# Patient Record
Sex: FEMALE | Race: WHITE | NOT HISPANIC OR LATINO | Employment: OTHER | URBAN - METROPOLITAN AREA
[De-identification: names, ages, dates, MRNs, and addresses within clinical notes are randomized per-mention and may not be internally consistent; named-entity substitution may affect disease eponyms.]

---

## 2016-11-07 PROCEDURE — G0463 HOSPITAL OUTPT CLINIC VISIT: HCPCS | Performed by: RADIOLOGY

## 2017-01-05 ENCOUNTER — OFFICE VISIT (OUTPATIENT)
Dept: RADIATION ONCOLOGY | Facility: HOSPITAL | Age: 67
End: 2017-01-05

## 2017-01-05 ENCOUNTER — APPOINTMENT (OUTPATIENT)
Dept: RADIATION ONCOLOGY | Facility: HOSPITAL | Age: 67
End: 2017-01-05

## 2017-01-05 VITALS
SYSTOLIC BLOOD PRESSURE: 123 MMHG | DIASTOLIC BLOOD PRESSURE: 68 MMHG | OXYGEN SATURATION: 99 % | RESPIRATION RATE: 16 BRPM | HEART RATE: 72 BPM | HEIGHT: 65 IN | BODY MASS INDEX: 22.49 KG/M2 | WEIGHT: 135 LBS | TEMPERATURE: 98.2 F

## 2017-01-05 DIAGNOSIS — C50.212 MALIGNANT NEOPLASM OF UPPER-INNER QUADRANT OF LEFT FEMALE BREAST (HCC): Primary | ICD-10-CM

## 2017-01-05 PROCEDURE — 99214 OFFICE O/P EST MOD 30 MIN: CPT | Performed by: RADIOLOGY

## 2017-01-05 PROCEDURE — 77263 THER RADIOLOGY TX PLNG CPLX: CPT | Performed by: RADIOLOGY

## 2017-01-05 PROCEDURE — 77332 RADIATION TREATMENT AID(S): CPT | Performed by: RADIOLOGY

## 2017-01-05 PROCEDURE — 77290 THER RAD SIMULAJ FIELD CPLX: CPT | Performed by: RADIOLOGY

## 2017-01-05 NOTE — MR AVS SNAPSHOT
Yuli Huang   2017 2:45 PM   Office Visit    Dept Phone:  [phone]   Encounter #:  [id number]    Provider:  Erika Osborn MD   Department:  [facility] RADIATION ONCOLOGY                Your Full Care Plan              Your Updated Medication List          This list is accurate as of: 17  4:01 PM.  Always use your most recent med list.                atorvastatin 40 MG tablet   Commonly known as:  LIPITOR       CALTRATE 600 PO       famciclovir 500 MG tablet   Commonly known as:  FAMVIR       folic acid 400 MCG tablet   Commonly known as:  FOLVITE       hyoscyamine 0.125 MG tablet   Commonly known as:  ANASPAZ,LEVSIN       LOMOTIL 2.5-0.025 MG per tablet   Generic drug:  diphenoxylate-atropine       meclizine 25 MG tablet   Commonly known as:  ANTIVERT       metoprolol succinate XL 25 MG 24 hr tablet   Commonly known as:  TOPROL-XL       MULTIVITAL PO       saccharomyces boulardii 250 MG capsule   Commonly known as:  FLORASTOR               Instructions     None    Patient Instructions History      [employer] Signup     [facility] SellStage allows you to send messages to your doctor, view your test results, renew your prescriptions, schedule appointments, and more. To sign up, go to ticketstreet and click on the Sign Up Now link in the New User? box. Enter your SellStage Activation Code exactly as it appears below along with the last four digits of your Social Security Number and your Date of Birth () to complete the sign-up process. If you do not sign up before the expiration date, you must request a new code.    SellStage Activation Code: [email or web address]  Expires: 2017  4:01 PM    If you have questions, you can email [email or web address] or call [phone] to talk to our SellStage staff. Remember, SellStage is NOT to be used for urgent needs. For medical emergencies, dial 911.               Other Info from Your Visit            "  Your appointments     Date & Time Provider Appointment Department    Jan 06, 2017  1:10 PM EST LAB CHAIR 4 CBC KRESGE Lab [facility] KREE ONCOLOGY CBC LAB    Jan 06, 2017  1:45 PM EST CHAIR 20 CBC KRE - SM INFUSION [facility] INFUSION CBC    Jan 09, 2017  9:00 AM EST MATHEW PET ADMIN RM 1 NM mathew pet injection 15 [facility] PET    Jan 09, 2017 10:30 AM EST MATHEW PET 1 NM mathew pet skull to thigh [facility] PET    Jan 20, 2017  2:00 PM EST LAB CHAIR 2 CBC KRESGE Lab [facility] KRESGE ONCOLOGY CBC LAB    Jan 20, 2017  2:40 PM EST Jordon Bonilla MD FOLLOW UP [facility] CBC GROUP: CONSULTANTS IN BLOOD DISORDERS AND CANCER    Jan 27, 2017  1:00 PM EST CHAIR 16 CBC KRE - SM INFUSION [facility] INFUSION CBC    Feb 08, 2017 11:30 AM EST Janet Velasquez, PT BIOIMPEDANCE [facility] OP PT MEDICAL [facility]  [place]  [address]  [place] [id number] [phone] [facility] INFUSION CBC  [place]  [address]  [place] [address]  [phone] [facility] OP PT MEDICAL [facility]  [address] [facility] [address]  [phone]        [facility] PET  [place]  [address]  [place] [address] [phone] [facility] CBC GROUP: CONSULTANTS IN BLOOD DISORDERS AND CANCER  CBC [place]  [address]  [place] [address]  [phone]           Vital Signs     Blood Pressure Pulse Temperature Respirations Height Weight    123/68 72 98.2 °F (36.8 °C) (Oral) 16 65.35\" (166 cm) 135 lb (61.2 kg)    Last Menstrual Period Oxygen Saturation Body Mass Index Smoking Status          (LMP Unknown) 99% 22.23 kg/m2 Former Smoker        Problems and Diagnoses Noted     Breast cancer        "

## 2017-01-05 NOTE — PROGRESS NOTES
Subjective     Ref: Denis Cortes     Diagnosis Plan   1. Malignant neoplasm of upper-inner quadrant of left female breast       Chief Complaint   Patient presents with   • Breast Cancer     re eval- left breast                                   DX: [id number] left breast cancer [id number]          Dear Dr. Cortes:  Yuli Huang returns today for re-evaluation and treatment planning.  The patient is a 66 y.o. year old female with a remote history of stage I right breast cancer tx with lumpectomy and radiation over 3 years ago now with more recently diagnosed [id number] left breast cancer status post neoadjuvant chemotherapy with [id number] disease at lumpectomy. She has since undergone a left modified radical mastectomy on 11/16/16 with pathology revealing a residual focus of high grade DCIS meauring 16 MM with DCIS less than 1mm from the deep margin. Tumor was also present within the lymphatic spaces. Ten axillary lymph nodes were negative for metastatic involvement. She now has a total of 1 of 12 lymph nodes positive. She went into the ER Saturday December 10 and had fluid aspirated from the axilla and she saw you on Monday and has another 120cc removed she tells me. She states the fluid has resolved and she is ready to get started with the radiation.  She is on antibiotics and denies any fever or chills. She had a PET scan in March 2016 which showed discernable activity in an internal mammary node suspicious for involvement and she is scheduled for a repeat PET scan on Monday, January 9.               Review of Systems   Constitutional: Negative.    HENT: Negative.    Respiratory: Negative.    Musculoskeletal: Positive for back pain.         Past Medical History   Diagnosis Date   • Arthritis    • Breast cancer      Invasive mammary cancer right breast associated with DCIS   • Depression    • History of cardiac cath 07/2014     10-20% LI    • History of migraine    • History of postmenopausal HRT      Post menopause  hormones for 4 years   • Hx of radiation therapy    • Hyperlipidemia    • NSVT (nonsustained ventricular tachycardia)      RVOT tachycardia   • PONV (postoperative nausea and vomiting)    • Post-menopause    • Scarlet fever    • Urinary frequency    • VPC (ventricular premature complex)          Past Surgical History   Procedure Laterality Date   • Back surgery       Discectomy   •  section     • Eye surgery     • Lumbar fusion     • Breast lumpectomy with sentinel node biopsy and axillary node dissection Left 10/5/2016     Procedure: LT BREAST LUMPECTOMY WITH MAMMO NEEDLE LOC W/ SENTINEL NODE BIOPSY AND POSS AXILLARY NODE DISSECTION;  Surgeon: Denis Cortes MD;  Location: [facility];  Service:    • Breast biopsy Left    • Breast biopsy       Stereotactic biopsey of right breast   • Breast surgery  2013     Lumpectomy   • Breast lumpectomy     • Breast lumpectomy Right    • Mastectomy Left 2016     Procedure: LEFT BREAST MASTECTOMY WITH LEFT AXILLARY NODE DISSECTION;  Surgeon: Denis Cortes MD;  Location: [facility];  Service:          Social History     Social History   • Marital status:      Spouse name: N/A   • Number of children: 1   • Years of education: 13     Occupational History   • retired  [employer]     Social History Main Topics   • Smoking status: Former Smoker     Packs/day: 2.00     Start date:      Quit date:    • Smokeless tobacco: Never Used      Comment: Quit for 10 years.  2 packs / day for 30 years (60 pack years).   • Alcohol use No      Comment: caffeine use   • Drug use: No   • Sexual activity: Defer     Other Topics Concern   • None     Social History Narrative         Family History   Problem Relation Age of Onset   • Heart disease Father    • Heart disease Other    • Diabetes Mother    • Breast cancer Mother 81   • Breast cancer Sister           Objective    Physical Exam   Constitutional: She appears well-developed and  "well-nourished.   HENT:   Head: Atraumatic.   Eyes: EOM are normal.   Neck: Neck supple.   Pulmonary/Chest:       Lymphadenopathy:     She has no cervical adenopathy.         Current Outpatient Prescriptions on File Prior to Visit   Medication Sig Dispense Refill   • atorvastatin (LIPITOR) 40 MG tablet Take 40 mg by mouth Every Evening.     • Calcium Carbonate (CALTRATE 600 PO) Take 1 tablet by mouth Every Morning.     • diphenoxylate-atropine (LOMOTIL) 2.5-0.025 MG per tablet Take 2 tablets by mouth 4 (Four) Times a Day As Needed for diarrhea.     • famciclovir (FAMVIR) 500 MG tablet Take 500 mg by mouth 3 (Three) Times a Day As Needed (cold sore).     • folic acid (FOLVITE) 400 MCG tablet Take 400 mcg by mouth Every Morning.     • hyoscyamine (ANASPAZ,LEVSIN) 0.125 MG tablet Take 0.125 mg by mouth Every 4 (Four) Hours As Needed (overactive bladder).     • meclizine (ANTIVERT) 25 MG tablet Take 25 mg by mouth 3 (Three) Times a Day As Needed.     • metoprolol succinate XL (TOPROL-XL) 25 MG 24 hr tablet Take 25 mg by mouth Every Evening. 1/2 tab each dose     • Multiple Vitamins-Minerals (MULTIVITAL PO) Take 1 tablet/day by mouth Every Morning.     • saccharomyces boulardii (FLORASTOR) 250 MG capsule Take 250 mg by mouth 2 (Two) Times a Day.       No current facility-administered medications on file prior to visit.        ALLERGIES:    Allergies   Allergen Reactions   • Nitrofurantoin Anaphylaxis   • Amoxicillin Diarrhea and Rash       Visit Vitals   • /68   • Pulse 72   • Temp 98.2 °F (36.8 °C) (Oral)   • Resp 16   • Ht 65.35\" (166 cm)   • Wt 135 lb (61.2 kg)   • LMP  (LMP Unknown)   • SpO2 99%   • BMI 22.23 kg/m2        Current Status 12/12/2016   ECOG score 1         Assessment/Plan     66 year old female with stage I right breast cancer and recently diagnosed stage III left breast cancer, now 7 weeks out from mastectomy. She had [id number] at time of lumpectomy and still had residual high grade DCIS in " mastectomy specimen but 10 negative lymph nodes.  We will proceed with treatment planning today and she is scheduled for a pet on Monday, jan 9.      I discussed with her the risks, benefits and rationale of radiation therapy to include, but not limited to, the following:     Acute: skin erythema, breakdown, infection/cellulitis requiring antibiotics, fatigue, pneumonitis resulting in shortness of breath, cough or pain, infection, lymphedema of the left arm     Late: Permanent skin changes including hyperpigmentation, telangiectasias, fibrosis of the skin and surrounding tissue/muscle resulting in smaller size, poor cosmetic outcome, late edema or cellulitis, late rib fracture, late cardiac damage resulting in slight increased risk of heart attack, late pulmonary fibrosis, lymphedema, brachialplexopathy resulting in numbness, tingling, pain or paralysis of the arm and the remote risk of second malignancies.      She voiced understanding and was given an opportunity to ask questions which were answered to her satisfaction.  We will plan to begin her treatments Thursday January 12 or Monday January 16.       Thank you very much for allowing me to participate in the care of this very pleasant patient.    Sincerely,      Erika Osborn MD

## 2017-01-06 ENCOUNTER — LAB (OUTPATIENT)
Dept: LAB | Facility: HOSPITAL | Age: 67
End: 2017-01-06

## 2017-01-06 ENCOUNTER — INFUSION (OUTPATIENT)
Dept: ONCOLOGY | Facility: HOSPITAL | Age: 67
End: 2017-01-06

## 2017-01-06 VITALS
SYSTOLIC BLOOD PRESSURE: 136 MMHG | TEMPERATURE: 98.7 F | DIASTOLIC BLOOD PRESSURE: 65 MMHG | WEIGHT: 138.4 LBS | BODY MASS INDEX: 22.78 KG/M2 | HEART RATE: 88 BPM

## 2017-01-06 DIAGNOSIS — C50.212 MALIGNANT NEOPLASM OF UPPER-INNER QUADRANT OF LEFT FEMALE BREAST (HCC): Primary | ICD-10-CM

## 2017-01-06 DIAGNOSIS — C50.212 MALIGNANT NEOPLASM OF UPPER-INNER QUADRANT OF LEFT FEMALE BREAST (HCC): ICD-10-CM

## 2017-01-06 LAB
BASOPHILS # BLD AUTO: 0.04 10*3/MM3 (ref 0–0.1)
BASOPHILS NFR BLD AUTO: 0.6 % (ref 0–1.1)
DEPRECATED RDW RBC AUTO: 42.2 FL (ref 37–49)
EOSINOPHIL # BLD AUTO: 0.12 10*3/MM3 (ref 0–0.36)
EOSINOPHIL NFR BLD AUTO: 1.8 % (ref 1–5)
ERYTHROCYTE [DISTWIDTH] IN BLOOD BY AUTOMATED COUNT: 11.4 % (ref 11.7–14.5)
HCT VFR BLD AUTO: 38.3 % (ref 34–45)
HGB BLD-MCNC: 13.5 G/DL (ref 11.5–14.9)
IMM GRANULOCYTES # BLD: 0.02 10*3/MM3 (ref 0–0.03)
IMM GRANULOCYTES NFR BLD: 0.3 % (ref 0–0.5)
LYMPHOCYTES # BLD AUTO: 2.85 10*3/MM3 (ref 1–3.5)
LYMPHOCYTES NFR BLD AUTO: 42.8 % (ref 20–49)
MCH RBC QN AUTO: 35.4 PG (ref 27–33)
MCHC RBC AUTO-ENTMCNC: 35.2 G/DL (ref 32–35)
MCV RBC AUTO: 100.5 FL (ref 83–97)
MONOCYTES # BLD AUTO: 0.6 10*3/MM3 (ref 0.25–0.8)
MONOCYTES NFR BLD AUTO: 9 % (ref 4–12)
NEUTROPHILS # BLD AUTO: 3.03 10*3/MM3 (ref 1.5–7)
NEUTROPHILS NFR BLD AUTO: 45.5 % (ref 39–75)
NRBC BLD MANUAL-RTO: 0 /100 WBC (ref 0–0)
PLATELET # BLD AUTO: 215 10*3/MM3 (ref 150–375)
PMV BLD AUTO: 9.1 FL (ref 8.9–12.1)
RBC # BLD AUTO: 3.81 10*6/MM3 (ref 3.9–5)
WBC NRBC COR # BLD: 6.66 10*3/MM3 (ref 4–10)

## 2017-01-06 PROCEDURE — 36416 COLLJ CAPILLARY BLOOD SPEC: CPT | Performed by: INTERNAL MEDICINE

## 2017-01-06 PROCEDURE — 85025 COMPLETE CBC W/AUTO DIFF WBC: CPT | Performed by: INTERNAL MEDICINE

## 2017-01-06 PROCEDURE — 25010000002 TRASTUZUMAB PER 10 MG: Performed by: INTERNAL MEDICINE

## 2017-01-06 PROCEDURE — 96413 CHEMO IV INFUSION 1 HR: CPT | Performed by: INTERNAL MEDICINE

## 2017-01-06 RX ORDER — SODIUM CHLORIDE 9 MG/ML
250 INJECTION, SOLUTION INTRAVENOUS ONCE
Status: COMPLETED | OUTPATIENT
Start: 2017-01-06 | End: 2017-01-06

## 2017-01-06 RX ADMIN — Medication 360 MG: at 13:52

## 2017-01-06 RX ADMIN — SODIUM CHLORIDE 250 ML: 900 INJECTION, SOLUTION INTRAVENOUS at 14:00

## 2017-01-09 ENCOUNTER — HOSPITAL ENCOUNTER (OUTPATIENT)
Dept: PET IMAGING | Facility: HOSPITAL | Age: 67
Discharge: HOME | End: 2017-01-09
Attending: RADIOLOGY

## 2017-01-09 ENCOUNTER — HOSPITAL ENCOUNTER (OUTPATIENT)
Dept: PET IMAGING | Facility: HOSPITAL | Age: 67
Discharge: HOME | End: 2017-01-09
Attending: RADIOLOGY | Admitting: RADIOLOGY

## 2017-01-09 DIAGNOSIS — C50.212 MALIGNANT NEOPLASM OF UPPER-INNER QUADRANT OF LEFT FEMALE BREAST (HCC): ICD-10-CM

## 2017-01-09 PROCEDURE — A9552 F18 FDG: HCPCS | Performed by: RADIOLOGY

## 2017-01-09 PROCEDURE — 78815 PET IMAGE W/CT SKULL-THIGH: CPT

## 2017-01-09 PROCEDURE — 0 FLUDEOXYGLUCOSE F18 SOLUTION: Performed by: RADIOLOGY

## 2017-01-09 PROCEDURE — 77370 RADIATION PHYSICS CONSULT: CPT | Performed by: RADIOLOGY

## 2017-01-09 RX ADMIN — FLUDEOXYGLUCOSE F18 1 DOSE: 300 INJECTION INTRAVENOUS at 09:00

## 2017-01-17 ENCOUNTER — DOCUMENTATION (OUTPATIENT)
Dept: PSYCHIATRY | Facility: HOSPITAL | Age: 67
End: 2017-01-17

## 2017-01-17 PROCEDURE — 77295 3-D RADIOTHERAPY PLAN: CPT | Performed by: RADIOLOGY

## 2017-01-17 PROCEDURE — 77331 SPECIAL RADIATION DOSIMETRY: CPT | Performed by: RADIOLOGY

## 2017-01-17 NOTE — PROGRESS NOTES
Referral rec'd from Raquel Lizama nurse navigator.  Chart reviewed.  Patient dx with stage III breast cancer in April of 2016, underwent mastectomy, chemotherapy and about to undergo XRT treatment.    Psychosocial assessment completed.  Patient is 66 yr old white female, who has been  twice, but still has a working relationship with her 2nd ex .  Patient has 1 biological son and 3 step children.   Patient is retired from [employer] , has insurance and is not in any financial distress at this time.  Patient reports that she has a large support system of friends and family.  She states that her ex  and his new wife, Mey have been extremely helpful and a great sense of support during the last 10 years.    Patient has hx of depression and was [facility] when she was a teenager... She states that after counseling and medication her symptoms were well managed and controlled.  Patient reports that she hasn't had a major depressive episode until just this last November, 2016.  Patient disclosed a long story about her son, his current girlfriend and the traumatic events surrounding their relationship.  Patient states that she is seeking help on how to have a relationship with her son, when she has no desire to ever see his current girlfriend.  She has not seen or talked with her son since Thanksgiving ( they have texted twice).      Discussed current mood and anxieties.  Patient states that she is not anxious, but very sad about the possible loss of relationship with her son.  Patient reports uncontrollably crying, inability to function and loss of interest in anything about 3 months ago when the last episode occurred.  She states that she is doing much better now and is coping.  Discussed medication assistance and patient declined. OSW recommended Family Therapy with patient and son ( if he is receptive) ... Explained that patients girlfriend will more than likely need to be  included for future sessions if she is going to remain in her sons life.  OSW referred patient to Dr. Kaushal Du, Marriage and Family Therapist and to [facility] for OP Family Therapy.  [facility] accepts her insurance.  Dr. Du does not - she is private pay.    OSW explained her role as an oncology social worker and encouraged patient to follow up with me for supportive counseling.   Thank you,  Janet Booth, KARENW, CSW, OSW-C

## 2017-01-18 ENCOUNTER — RADIATION ONCOLOGY WEEKLY ASSESSMENT (OUTPATIENT)
Dept: RADIATION ONCOLOGY | Facility: HOSPITAL | Age: 67
End: 2017-01-18

## 2017-01-18 DIAGNOSIS — C50.212 MALIGNANT NEOPLASM OF UPPER-INNER QUADRANT OF LEFT FEMALE BREAST (HCC): Primary | ICD-10-CM

## 2017-01-18 PROCEDURE — 77300 RADIATION THERAPY DOSE PLAN: CPT | Performed by: RADIOLOGY

## 2017-01-18 PROCEDURE — 77417 THER RADIOLOGY PORT IMAGE(S): CPT | Performed by: RADIOLOGY

## 2017-01-18 PROCEDURE — 77280 THER RAD SIMULAJ FIELD SMPL: CPT | Performed by: RADIOLOGY

## 2017-01-18 PROCEDURE — 77334 RADIATION TREATMENT AID(S): CPT | Performed by: RADIOLOGY

## 2017-01-18 PROCEDURE — 77412 RADIATION TX DELIVERY LVL 3: CPT | Performed by: RADIOLOGY

## 2017-01-18 PROCEDURE — 77332 RADIATION TREATMENT AID(S): CPT | Performed by: RADIOLOGY

## 2017-01-18 PROCEDURE — 77427 RADIATION TX MANAGEMENT X5: CPT | Performed by: RADIOLOGY

## 2017-01-18 NOTE — PROGRESS NOTES
Physician Weekly Management Note    Diagnosis:     Diagnosis Plan   1. Malignant neoplasm of upper-inner quadrant of left female breast         RT Details: fx 1/28 left chest wall and regional nodes  Notes on Treatment course, Films, Medical progress:  Doing fine, I was present for entire clinical setup  On machine, she had no questiosn    Weekly Management:  Medication reviewed?   Yes  New medications given?   No  Problemlist reviewed?   Yes  Problem added?   No  Issues raised requiring referral to support services - task assigned to:  na    Technical aspects reviewed:  Weekly OBI approved?   Yes  Weekly port films approved?   Yes  Change requests noted on port film?   No  Patient setup and plan reviewed?   Yes    Chart Reviewed:  Continue current treatment plan?   Yes  Treatment plan change requested?   No  CBC reviewed?   Yes  Concurrent Chemo?   No    Objective     Toxicities:   none     Review of Systems   Constitutional: Negative.    HENT: Negative.    Respiratory: Negative.    Skin: Negative.           There were no vitals filed for this visit.    Current Status 12/12/2016   ECOG score 1       Physical Exam   Constitutional: She appears well-developed and well-nourished.           Problem Summary List    Diagnosis:     Diagnosis Plan   1. Malignant neoplasm of upper-inner quadrant of left female breast       Pathology:   Ductal adenoc    Past Medical History   Diagnosis Date   • Arthritis    • Breast cancer      Invasive mammary cancer right breast associated with DCIS   • Depression    • History of cardiac cath 07/2014     10-20% LI    • History of migraine    • History of postmenopausal HRT      Post menopause hormones for 4 years   • Hx of radiation therapy    • Hyperlipidemia    • NSVT (nonsustained ventricular tachycardia)      RVOT tachycardia   • PONV (postoperative nausea and vomiting)    • Post-menopause    • Scarlet fever    • Urinary frequency    • VPC (ventricular premature complex)          Past  Surgical History   Procedure Laterality Date   • Back surgery       Discectomy   •  section     • Eye surgery     • Lumbar fusion     • Breast lumpectomy with sentinel node biopsy and axillary node dissection Left 10/5/2016     Procedure: LT BREAST LUMPECTOMY WITH MAMMO NEEDLE LOC W/ SENTINEL NODE BIOPSY AND POSS AXILLARY NODE DISSECTION;  Surgeon: Denis Cortes MD;  Location: [facility];  Service:    • Breast biopsy Left    • Breast biopsy       Stereotactic biopsey of right breast   • Breast surgery  2013     Lumpectomy   • Breast lumpectomy     • Breast lumpectomy Right    • Mastectomy Left 2016     Procedure: LEFT BREAST MASTECTOMY WITH LEFT AXILLARY NODE DISSECTION;  Surgeon: Denis Cortes MD;  Location: [facility];  Service:          Current Outpatient Prescriptions on File Prior to Visit   Medication Sig Dispense Refill   • atorvastatin (LIPITOR) 40 MG tablet Take 40 mg by mouth Every Evening.     • Calcium Carbonate (CALTRATE 600 PO) Take 1 tablet by mouth Every Morning.     • diphenoxylate-atropine (LOMOTIL) 2.5-0.025 MG per tablet Take 2 tablets by mouth 4 (Four) Times a Day As Needed for diarrhea.     • famciclovir (FAMVIR) 500 MG tablet Take 500 mg by mouth 3 (Three) Times a Day As Needed (cold sore).     • folic acid (FOLVITE) 400 MCG tablet Take 400 mcg by mouth Every Morning.     • hyoscyamine (ANASPAZ,LEVSIN) 0.125 MG tablet Take 0.125 mg by mouth Every 4 (Four) Hours As Needed (overactive bladder).     • meclizine (ANTIVERT) 25 MG tablet Take 25 mg by mouth 3 (Three) Times a Day As Needed.     • metoprolol succinate XL (TOPROL-XL) 25 MG 24 hr tablet Take 25 mg by mouth Every Evening. 1/2 tab each dose     • Multiple Vitamins-Minerals (MULTIVITAL PO) Take 1 tablet/day by mouth Every Morning.     • saccharomyces boulardii (FLORASTOR) 250 MG capsule Take 250 mg by mouth 2 (Two) Times a Day.       No current facility-administered medications on file prior to visit.         Allergies   Allergen Reactions   • Nitrofurantoin Anaphylaxis   • Amoxicillin Diarrhea and Rash         Referring Provider:    No referring provider defined for this encounter.    Oncologist:  No primary care provider on file.      Seen and approved by:  Erika Osborn MD  01/18/2017

## 2017-01-19 PROCEDURE — 77412 RADIATION TX DELIVERY LVL 3: CPT | Performed by: RADIOLOGY

## 2017-01-19 PROCEDURE — 77336 RADIATION PHYSICS CONSULT: CPT | Performed by: RADIOLOGY

## 2017-01-20 ENCOUNTER — OFFICE VISIT (OUTPATIENT)
Dept: ONCOLOGY | Facility: CLINIC | Age: 67
End: 2017-01-20

## 2017-01-20 ENCOUNTER — APPOINTMENT (OUTPATIENT)
Dept: LAB | Facility: HOSPITAL | Age: 67
End: 2017-01-20

## 2017-01-20 VITALS
HEART RATE: 88 BPM | WEIGHT: 140.2 LBS | TEMPERATURE: 98.5 F | SYSTOLIC BLOOD PRESSURE: 110 MMHG | OXYGEN SATURATION: 97 % | DIASTOLIC BLOOD PRESSURE: 62 MMHG | HEIGHT: 65 IN | RESPIRATION RATE: 16 BRPM | BODY MASS INDEX: 23.36 KG/M2

## 2017-01-20 DIAGNOSIS — C50.212 MALIGNANT NEOPLASM OF UPPER-INNER QUADRANT OF LEFT FEMALE BREAST (HCC): Primary | ICD-10-CM

## 2017-01-20 PROCEDURE — 77412 RADIATION TX DELIVERY LVL 3: CPT | Performed by: RADIOLOGY

## 2017-01-20 PROCEDURE — G0463 HOSPITAL OUTPT CLINIC VISIT: HCPCS | Performed by: INTERNAL MEDICINE

## 2017-01-20 PROCEDURE — 99213 OFFICE O/P EST LOW 20 MIN: CPT | Performed by: INTERNAL MEDICINE

## 2017-01-20 RX ORDER — SODIUM CHLORIDE 9 MG/ML
250 INJECTION, SOLUTION INTRAVENOUS ONCE
Status: CANCELLED | OUTPATIENT
Start: 2017-01-27

## 2017-01-20 RX ORDER — SODIUM CHLORIDE 9 MG/ML
250 INJECTION, SOLUTION INTRAVENOUS ONCE
Status: CANCELLED | OUTPATIENT
Start: 2017-02-17

## 2017-01-20 NOTE — LETTER
January 20, 2017     Pardeep Stewart MD  [address]  [facility] [address]    Patient: Yuli Huang   YOB: 1950   Date of Visit: 1/20/2017       Dear Dr. Terry MD:    Yuli Huang was in my office today. Below is a copy of my note.    If you have questions, please do not hesitate to call me. I look forward to following Yuli along with you.         Sincerely,        Jordon Bonilla MD        CC: MD Avni Bradley MD Jamie D Kemp, MD Crystal H. McMahan, MD    [facility] OUTPATIENT CLINIC FOLLOW UP VISIT    REASON FOR FOLLOW-UP:      Malignant neoplasm of upper-inner quadrant of left female breast    Staging form: Breast, AJCC V7      Clinical stage from 4/14/2016: Stage IIIC (T3, N3b, M0) - Signed by Jordon Bonilla MD on 4/28/2016    Malignant neoplasm of upper-outer quadrant of right female breast    Staging form: Breast, AJCC V7      Clinical stage from 3/25/2016: Stage IA (rT1a, N0, M0) - Signed by Erika Osborn MD on 3/25/2016    HISTORY OF PRESENT ILLNESS:  Yuli Huang is a 66 y.o. female who returns today for follow up of the above issue.      She has recovered from her mastectomy.  She started radiation on Wednesday.  This is going well so far.  Her peripheral neuropathy improved but has now plateaued and she continues to have some numbness in her fingertips and on the bottom of her feet.  It is not really impairing function at this point but it is annoying.  I advised her to take the vitamin B complex vitamin.  Food is starting to taste more normal.  In addition, she states that her hair has not started to grow in as much as she would like at this point.  Also, she has had some emotional issues involving her son.  She is going to seek out some counseling for this.    ONCOLOGIC HISTORY:     Malignant neoplasm of upper-outer quadrant of right female breast    5/13/2013 Initial Diagnosis        5/13/2013 Biopsy    Biopsy:   Ductal carcinoma in situ.  ER 20%, LA 1-4%.  Her2 not done.      6/4/2013 Surgery    Lumpectomy by Dr. Denis Cortes.  2mm invasive cancer associated with DCIS.  ER/LA negative on the DCIS; unable to be performed on the invasive component.        6/18/2013 Surgery    [place] lymph node biopsy:  3 nodes negative.      7/10/2013 - 8/23/2013 Radiation    Radiation therapy to the right breast.        Malignant neoplasm of upper-inner quadrant of left female breast    3/31/2016 Imaging    Ultrasound left breast:  10 o'clock position, 3x2cm mass with a 2cm axillary lymph node.      4/7/2016 Biopsy    Ultrasound-guided biopsy of the left breast and axillary lymph node.  ER/LA negative, HER-2 overexpressed.  Grade 2.        4/19/2016 Imaging    PET scan:  Left breast mass demonstrate significant metabolic activity.  There is a single 1.7 cm lymph node at the left axilla which  demonstrates significant metabolic activity for its size. There is also  a subcentimeter node in the left internal mammary chain which  nonetheless demonstrates discernible activity. I suspect an early erasto  metastasis. No further evidence for metastatic disease is present.      4/21/2016 Imaging    Breast MRI:  1. Biopsy-proven malignancy in the left breast extending from the 8:30  o'clock through 1 o'clock positions and measuring up to 7.6 cm in  greatest dimension. Adjacent satellite clumped foci of enhancement are  also noted in the upper outer and lower outer quadrants. Left axillary  adenopathy is also appreciated.  2. There are no findings suspicious for malignancy in the right breast.  Postbreast conservation therapy changes of the right breast are noted.      4/29/2016 - 8/15/2016 Chemotherapy    Neoadjuvant TCHP      9/8/2016 Imaging    MRI breasts:  1. Findings consistent with significant interval response to neoadjuvant  chemotherapy in the left breast. However, there remains some scattered  stippled foci of enhancement that are  asymmetric in the left breast  compared to the right breast and they are from the 8 o'clock through 12  o'clock positions in distribution. This corresponds to a region that was  previously occupied by considerable neoplastic disease/malignancy, thus  microscopic multifocal disease is suspected. There has been resolution  of left axillary adenopathy.  2. There are no findings suspicious for malignancy in the right breast.      10/5/2016 Surgery    Lumpectomy with sentinel lymph node biopsy:  Breast:  DCIS spanning 3.3cm.  ER/DC negative.  Multifocal.  No invasive carcinoma  1 of 2 sentinel nodes positive for carcinoma measuring 1.1mm without extracapsular extension.        11/16/2016 Surgery    Left modified radical mastectomy with axillary lymph node sampling by Dr. Cortes.  High grade DCIS with margins <1mm.  10 benign axillary lymph nodes.            PAST MEDICAL, SURGICAL, FAMILY, AND SOCIAL HISTORIES WERE REVIEWED WITH THE PATIENT AND IN THE ELECTRONIC MEDICAL RECORD, AND WERE UPDATED IF INDICATED.    ALLERGIES:  Allergies   Allergen Reactions   • Nitrofurantoin Anaphylaxis   • Amoxicillin Diarrhea and Rash       MEDICATIONS:  The medication list has been reviewed with the patient by the medical assistant, and the list has been updated in the electronic medical record, which I reviewed.  Medication dosages and frequencies were confirmed to be accurate.    REVIEW OF SYSTEMS:  PAIN:  See Vital Signs below.  GENERAL:  No fevers, chills, night sweats, or unintended weight loss.  SKIN:  No rashes or non-healing lesions.  Alopecia on the scalp persists.  HEME/LYMPH:  No abnormal bleeding.  No palpable lymphadenopathy.  EYES:  No vision changes or diplopia.  ENT:  No mucositis or ulcers  RESPIRATORY:  No cough, shortness of breath, hemoptysis, or wheezing.  CARDIOVASCULAR:  No chest pain, palpitations, orthopnea, or dyspnea on exertion.  GASTROINTESTINAL:  See history of present illness.  GENITOURINARY:  No dysuria or  "hematuria.  MUSCULOSKELETAL:  Improving range of motion of the left shoulder.    NEUROLOGIC:  Numbness in her fingertips and on the bottom of her feet  PSYCHIATRIC:  Some anxiety and tearfulness persists.    Vitals:    01/20/17 1428   BP: 110/62   Pulse: 88   Resp: 16   Temp: 98.5 °F (36.9 °C)   TempSrc: Oral   SpO2: 97%   Weight: 140 lb 3.2 oz (63.6 kg)   Height: 65.35\" (166 cm)   PainSc: 0-No pain       PHYSICAL EXAMINATION:  GENERAL:  Well-developed well-nourished female; awake, alert and oriented, in no acute distress.  SKIN: Marked for radiation  HEAD:  Normocephalic, atraumatic. Alopecia   EYES:  Pupils equal, round and reactive to light.  Extraocular movements intact.  Conjunctivae normal.  EARS:  Hearing intact.  NOSE:  Septum midline.  No excoriations or nasal discharge.  MOUTH:  No stomatitis or ulcers.  Lips are normal.  THROAT:  Oropharynx without lesions or exudates.  NECK:  Supple with good range of motion; no thyromegaly or masses; no JVD or bruits.  LYMPHATICS:  No cervical, supraclavicular, axillary, or inguinal lymphadenopathy.  CHEST:  Lungs are clear to auscultation bilaterally.  No wheezes, rales, or rhonchi.  A Mediport is present as benign in appearance in the right subclavian area.  HEART:  Regular rate; normal rhythm.  No murmurs, gallops or rubs.  ABDOMEN:  Soft, non-tender, non-distended.  Normal active bowel sounds.  No organomegaly  EXTREMITIES:  No clubbing, cyanosis, or edema.  NEUROLOGICAL:  No focal neurologic deficits.  BREASTS:  Not examined today.    DIAGNOSTIC DATA:  Lab Results   Component Value Date    WBC 6.66 01/06/2017    HGB 13.5 01/06/2017    HCT 38.3 01/06/2017    .5 (H) 01/06/2017     01/06/2017       Lab Results   Component Value Date    GLUCOSE 137 (H) 11/09/2016    BUN 17 11/09/2016    CREATININE 0.69 11/09/2016    EGFRIFNONA 86 11/09/2016    EGFRIFAFRI  09/16/2016      Comment:      <15 Indicative of kidney failure.    BCR 24.6 11/09/2016    CO2 26.9 " 11/09/2016    CALCIUM 8.8 11/09/2016    ALBUMIN 3.80 11/09/2016    LABIL2 1.7 11/09/2016    AST 22 11/09/2016    ALT 22 11/09/2016     Lab Results   Component Value Date    GLUCOSE 137 (H) 11/09/2016    CALCIUM 8.8 11/09/2016     11/09/2016    K 3.7 11/09/2016    CO2 26.9 11/09/2016     11/09/2016    BUN 17 11/09/2016    CREATININE 0.69 11/09/2016    EGFRIFAFRI  09/16/2016      Comment:      <15 Indicative of kidney failure.    EGFRIFNONA 86 11/09/2016    BCR 24.6 11/09/2016    ANIONGAP 13.1 11/09/2016     Imaging: None reviewed    ASSESSMENT:  This is a 66 y.o. female with:  1.  History of stage I carcinoma of the right breast.  The biopsy initially showed DCIS.  There was a tiny invasive component on the surgical specimen.  This was minimally hormone receptor positive and HER-2 was not able to be tested.  She had radiation following her surgery but no medical therapy.  2.  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  She completed 6 cycles of neoadjuvant chemotherapy with TCH P followed by a lumpectomy on 10/5/2016.  No residual invasive carcinoma in the breast but there was extensive DCIS with very close margins.  One lymph node was positive for metastatic carcinoma.  We discussed her case at the multidisciplinary breast conference.  She had a mammogram that showed some persistent suspicious calcifications and therefore on 11/16/2016 had a left modified radical mastectomy with axillary lymph node sampling.  High-grade DCIS was present but no invasive carcinoma and no lymph nodes were positive.  Margins were very close.  We proceed with Herceptin today and every 3 weeks through April.  She had a PET scan that is negative.  She proceeds with radiation therapy.  3.  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      PLAN:   1.  Herceptin next week and every 3 weeks through  April.  2.  Echocardiograms every 3 months.  Her next will be due in early February.  This was ordered today.  3.  She'll continue radiation under the direction of Dr. Osborn.  4.  She will seek out some counseling  5.  Start a vitamin B complex vitamin  6.  She will return next week for Herceptin and then every 3 weeks thereafter.  I will see her back in 7 weeks.

## 2017-01-20 NOTE — PROGRESS NOTES
[facility] OUTPATIENT CLINIC FOLLOW UP VISIT    REASON FOR FOLLOW-UP:      Malignant neoplasm of upper-inner quadrant of left female breast    Staging form: Breast, AJCC V7      Clinical stage from 4/14/2016: Stage IIIC (T3, N3b, M0) - Signed by Jordon Bonilla MD on 4/28/2016    Malignant neoplasm of upper-outer quadrant of right female breast    Staging form: Breast, AJCC V7      Clinical stage from 3/25/2016: Stage IA (rT1a, N0, M0) - Signed by Erika Osborn MD on 3/25/2016    HISTORY OF PRESENT ILLNESS:  Yuli Huang is a 66 y.o. female who returns today for follow up of the above issue.      She has recovered from her mastectomy.  She started radiation on Wednesday.  This is going well so far.  Her peripheral neuropathy improved but has now plateaued and she continues to have some numbness in her fingertips and on the bottom of her feet.  It is not really impairing function at this point but it is annoying.  I advised her to take the vitamin B complex vitamin.  Food is starting to taste more normal.  In addition, she states that her hair has not started to grow in as much as she would like at this point.  Also, she has had some emotional issues involving her son.  She is going to seek out some counseling for this.    ONCOLOGIC HISTORY:     Malignant neoplasm of upper-outer quadrant of right female breast    5/13/2013 Initial Diagnosis        5/13/2013 Biopsy    Biopsy:  Ductal carcinoma in situ.  ER 20%, MS 1-4%.  Her2 not done.      6/4/2013 Surgery    Lumpectomy by Dr. Denis Cortes.  2mm invasive cancer associated with DCIS.  ER/MS negative on the DCIS; unable to be performed on the invasive component.        6/18/2013 Surgery    [place] lymph node biopsy:  3 nodes negative.      7/10/2013 - 8/23/2013 Radiation    Radiation therapy to the right breast.        Malignant neoplasm of upper-inner quadrant of left female breast    3/31/2016 Imaging    Ultrasound left breast:  10 o'clock  position, 3x2cm mass with a 2cm axillary lymph node.      4/7/2016 Biopsy    Ultrasound-guided biopsy of the left breast and axillary lymph node.  ER/NE negative, HER-2 overexpressed.  Grade 2.        4/19/2016 Imaging    PET scan:  Left breast mass demonstrate significant metabolic activity.  There is a single 1.7 cm lymph node at the left axilla which  demonstrates significant metabolic activity for its size. There is also  a subcentimeter node in the left internal mammary chain which  nonetheless demonstrates discernible activity. I suspect an early erasto  metastasis. No further evidence for metastatic disease is present.      4/21/2016 Imaging    Breast MRI:  1. Biopsy-proven malignancy in the left breast extending from the 8:30  o'clock through 1 o'clock positions and measuring up to 7.6 cm in  greatest dimension. Adjacent satellite clumped foci of enhancement are  also noted in the upper outer and lower outer quadrants. Left axillary  adenopathy is also appreciated.  2. There are no findings suspicious for malignancy in the right breast.  Postbreast conservation therapy changes of the right breast are noted.      4/29/2016 - 8/15/2016 Chemotherapy    Neoadjuvant TCHP      9/8/2016 Imaging    MRI breasts:  1. Findings consistent with significant interval response to neoadjuvant  chemotherapy in the left breast. However, there remains some scattered  stippled foci of enhancement that are asymmetric in the left breast  compared to the right breast and they are from the 8 o'clock through 12  o'clock positions in distribution. This corresponds to a region that was  previously occupied by considerable neoplastic disease/malignancy, thus  microscopic multifocal disease is suspected. There has been resolution  of left axillary adenopathy.  2. There are no findings suspicious for malignancy in the right breast.      10/5/2016 Surgery    Lumpectomy with sentinel lymph node biopsy:  Breast:  DCIS spanning 3.3cm.  ER/NE  "negative.  Multifocal.  No invasive carcinoma  1 of 2 sentinel nodes positive for carcinoma measuring 1.1mm without extracapsular extension.        11/16/2016 Surgery    Left modified radical mastectomy with axillary lymph node sampling by Dr. Cortes.  High grade DCIS with margins <1mm.  10 benign axillary lymph nodes.            PAST MEDICAL, SURGICAL, FAMILY, AND SOCIAL HISTORIES WERE REVIEWED WITH THE PATIENT AND IN THE ELECTRONIC MEDICAL RECORD, AND WERE UPDATED IF INDICATED.    ALLERGIES:  Allergies   Allergen Reactions   • Nitrofurantoin Anaphylaxis   • Amoxicillin Diarrhea and Rash       MEDICATIONS:  The medication list has been reviewed with the patient by the medical assistant, and the list has been updated in the electronic medical record, which I reviewed.  Medication dosages and frequencies were confirmed to be accurate.    REVIEW OF SYSTEMS:  PAIN:  See Vital Signs below.  GENERAL:  No fevers, chills, night sweats, or unintended weight loss.  SKIN:  No rashes or non-healing lesions.  Alopecia on the scalp persists.  HEME/LYMPH:  No abnormal bleeding.  No palpable lymphadenopathy.  EYES:  No vision changes or diplopia.  ENT:  No mucositis or ulcers  RESPIRATORY:  No cough, shortness of breath, hemoptysis, or wheezing.  CARDIOVASCULAR:  No chest pain, palpitations, orthopnea, or dyspnea on exertion.  GASTROINTESTINAL:  See history of present illness.  GENITOURINARY:  No dysuria or hematuria.  MUSCULOSKELETAL:  Improving range of motion of the left shoulder.    NEUROLOGIC:  Numbness in her fingertips and on the bottom of her feet  PSYCHIATRIC:  Some anxiety and tearfulness persists.    Vitals:    01/20/17 1428   BP: 110/62   Pulse: 88   Resp: 16   Temp: 98.5 °F (36.9 °C)   TempSrc: Oral   SpO2: 97%   Weight: 140 lb 3.2 oz (63.6 kg)   Height: 65.35\" (166 cm)   PainSc: 0-No pain       PHYSICAL EXAMINATION:  GENERAL:  Well-developed well-nourished female; awake, alert and oriented, in no acute " distress.  SKIN: Marked for radiation  HEAD:  Normocephalic, atraumatic. Alopecia   EYES:  Pupils equal, round and reactive to light.  Extraocular movements intact.  Conjunctivae normal.  EARS:  Hearing intact.  NOSE:  Septum midline.  No excoriations or nasal discharge.  MOUTH:  No stomatitis or ulcers.  Lips are normal.  THROAT:  Oropharynx without lesions or exudates.  NECK:  Supple with good range of motion; no thyromegaly or masses; no JVD or bruits.  LYMPHATICS:  No cervical, supraclavicular, axillary, or inguinal lymphadenopathy.  CHEST:  Lungs are clear to auscultation bilaterally.  No wheezes, rales, or rhonchi.  A Mediport is present as benign in appearance in the right subclavian area.  HEART:  Regular rate; normal rhythm.  No murmurs, gallops or rubs.  ABDOMEN:  Soft, non-tender, non-distended.  Normal active bowel sounds.  No organomegaly  EXTREMITIES:  No clubbing, cyanosis, or edema.  NEUROLOGICAL:  No focal neurologic deficits.  BREASTS:  Not examined today.    DIAGNOSTIC DATA:  Lab Results   Component Value Date    WBC 6.66 01/06/2017    HGB 13.5 01/06/2017    HCT 38.3 01/06/2017    .5 (H) 01/06/2017     01/06/2017       Lab Results   Component Value Date    GLUCOSE 137 (H) 11/09/2016    BUN 17 11/09/2016    CREATININE 0.69 11/09/2016    EGFRIFNONA 86 11/09/2016    EGFRIFAFRI  09/16/2016      Comment:      <15 Indicative of kidney failure.    BCR 24.6 11/09/2016    CO2 26.9 11/09/2016    CALCIUM 8.8 11/09/2016    ALBUMIN 3.80 11/09/2016    LABIL2 1.7 11/09/2016    AST 22 11/09/2016    ALT 22 11/09/2016     Lab Results   Component Value Date    GLUCOSE 137 (H) 11/09/2016    CALCIUM 8.8 11/09/2016     11/09/2016    K 3.7 11/09/2016    CO2 26.9 11/09/2016     11/09/2016    BUN 17 11/09/2016    CREATININE 0.69 11/09/2016    EGFRIFAFRI  09/16/2016      Comment:      <15 Indicative of kidney failure.    EGFRIFNONA 86 11/09/2016    BCR 24.6 11/09/2016    ANIONGAP 13.1 11/09/2016      Imaging: None reviewed    ASSESSMENT:  This is a 66 y.o. female with:  1.  History of stage I carcinoma of the right breast.  The biopsy initially showed DCIS.  There was a tiny invasive component on the surgical specimen.  This was minimally hormone receptor positive and HER-2 was not able to be tested.  She had radiation following her surgery but no medical therapy.  2.  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  She completed 6 cycles of neoadjuvant chemotherapy with TCH P followed by a lumpectomy on 10/5/2016.  No residual invasive carcinoma in the breast but there was extensive DCIS with very close margins.  One lymph node was positive for metastatic carcinoma.  We discussed her case at the multidisciplinary breast conference.  She had a mammogram that showed some persistent suspicious calcifications and therefore on 11/16/2016 had a left modified radical mastectomy with axillary lymph node sampling.  High-grade DCIS was present but no invasive carcinoma and no lymph nodes were positive.  Margins were very close.  We proceed with Herceptin today and every 3 weeks through April.  She had a PET scan that is negative.  She proceeds with radiation therapy.  3.  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      PLAN:   1.  Herceptin next week and every 3 weeks through April.  2.  Echocardiograms every 3 months.  Her next will be due in early February.  This was ordered today.  3.  She'll continue radiation under the direction of Dr. Osborn.  4.  She will seek out some counseling  5.  Start a vitamin B complex vitamin  6.  She will return next week for Herceptin and then every 3 weeks thereafter.  I will see her back in 7 weeks.

## 2017-01-23 PROCEDURE — 77412 RADIATION TX DELIVERY LVL 3: CPT | Performed by: RADIOLOGY

## 2017-01-24 PROCEDURE — 77417 THER RADIOLOGY PORT IMAGE(S): CPT | Performed by: RADIOLOGY

## 2017-01-24 PROCEDURE — 77412 RADIATION TX DELIVERY LVL 3: CPT | Performed by: RADIOLOGY

## 2017-01-25 PROCEDURE — 77412 RADIATION TX DELIVERY LVL 3: CPT | Performed by: RADIOLOGY

## 2017-01-25 PROCEDURE — 77427 RADIATION TX MANAGEMENT X5: CPT | Performed by: RADIOLOGY

## 2017-01-26 ENCOUNTER — RADIATION ONCOLOGY WEEKLY ASSESSMENT (OUTPATIENT)
Dept: RADIATION ONCOLOGY | Facility: HOSPITAL | Age: 67
End: 2017-01-26
Payer: COMMERCIAL

## 2017-01-26 VITALS
HEART RATE: 79 BPM | WEIGHT: 140 LBS | BODY MASS INDEX: 23.05 KG/M2 | SYSTOLIC BLOOD PRESSURE: 119 MMHG | OXYGEN SATURATION: 96 % | DIASTOLIC BLOOD PRESSURE: 62 MMHG | RESPIRATION RATE: 16 BRPM | TEMPERATURE: 97.6 F

## 2017-01-26 DIAGNOSIS — C50.212 MALIGNANT NEOPLASM OF UPPER-INNER QUADRANT OF LEFT FEMALE BREAST, UNSPECIFIED ESTROGEN RECEPTOR STATUS: Primary | ICD-10-CM

## 2017-01-26 PROCEDURE — 77412 RADIATION TX DELIVERY LVL 3: CPT | Performed by: RADIOLOGY

## 2017-01-26 PROCEDURE — 77336 RADIATION PHYSICS CONSULT: CPT | Performed by: RADIOLOGY

## 2017-01-27 ENCOUNTER — INFUSION (OUTPATIENT)
Dept: ONCOLOGY | Facility: HOSPITAL | Age: 67
End: 2017-01-27

## 2017-01-27 ENCOUNTER — LAB (OUTPATIENT)
Dept: LAB | Facility: HOSPITAL | Age: 67
End: 2017-01-27

## 2017-01-27 VITALS
TEMPERATURE: 98.3 F | WEIGHT: 143.4 LBS | SYSTOLIC BLOOD PRESSURE: 123 MMHG | BODY MASS INDEX: 23.61 KG/M2 | HEART RATE: 76 BPM | DIASTOLIC BLOOD PRESSURE: 65 MMHG

## 2017-01-27 DIAGNOSIS — C50.212 MALIGNANT NEOPLASM OF UPPER-INNER QUADRANT OF LEFT FEMALE BREAST (HCC): Primary | ICD-10-CM

## 2017-01-27 DIAGNOSIS — C50.212 MALIGNANT NEOPLASM OF UPPER-INNER QUADRANT OF LEFT FEMALE BREAST (HCC): ICD-10-CM

## 2017-01-27 LAB
BASOPHILS # BLD AUTO: 0.02 10*3/MM3 (ref 0–0.1)
BASOPHILS NFR BLD AUTO: 0.4 % (ref 0–1.1)
DEPRECATED RDW RBC AUTO: 43.3 FL (ref 37–49)
EOSINOPHIL # BLD AUTO: 0.11 10*3/MM3 (ref 0–0.36)
EOSINOPHIL NFR BLD AUTO: 2.4 % (ref 1–5)
ERYTHROCYTE [DISTWIDTH] IN BLOOD BY AUTOMATED COUNT: 11.5 % (ref 11.7–14.5)
HCT VFR BLD AUTO: 33.9 % (ref 34–45)
HGB BLD-MCNC: 11.8 G/DL (ref 11.5–14.9)
IMM GRANULOCYTES # BLD: 0.02 10*3/MM3 (ref 0–0.03)
IMM GRANULOCYTES NFR BLD: 0.4 % (ref 0–0.5)
LYMPHOCYTES # BLD AUTO: 1.43 10*3/MM3 (ref 1–3.5)
LYMPHOCYTES NFR BLD AUTO: 31.3 % (ref 20–49)
MCH RBC QN AUTO: 35.6 PG (ref 27–33)
MCHC RBC AUTO-ENTMCNC: 34.8 G/DL (ref 32–35)
MCV RBC AUTO: 102.4 FL (ref 83–97)
MONOCYTES # BLD AUTO: 0.42 10*3/MM3 (ref 0.25–0.8)
MONOCYTES NFR BLD AUTO: 9.2 % (ref 4–12)
NEUTROPHILS # BLD AUTO: 2.57 10*3/MM3 (ref 1.5–7)
NEUTROPHILS NFR BLD AUTO: 56.3 % (ref 39–75)
NRBC BLD MANUAL-RTO: 0 /100 WBC (ref 0–0)
PLATELET # BLD AUTO: 158 10*3/MM3 (ref 150–375)
PMV BLD AUTO: 9.6 FL (ref 8.9–12.1)
RBC # BLD AUTO: 3.31 10*6/MM3 (ref 3.9–5)
WBC NRBC COR # BLD: 4.57 10*3/MM3 (ref 4–10)

## 2017-01-27 PROCEDURE — 96413 CHEMO IV INFUSION 1 HR: CPT | Performed by: INTERNAL MEDICINE

## 2017-01-27 PROCEDURE — 36416 COLLJ CAPILLARY BLOOD SPEC: CPT | Performed by: INTERNAL MEDICINE

## 2017-01-27 PROCEDURE — 77412 RADIATION TX DELIVERY LVL 3: CPT | Performed by: RADIOLOGY

## 2017-01-27 PROCEDURE — 85025 COMPLETE CBC W/AUTO DIFF WBC: CPT | Performed by: INTERNAL MEDICINE

## 2017-01-27 PROCEDURE — 25010000002 TRASTUZUMAB PER 10 MG: Performed by: INTERNAL MEDICINE

## 2017-01-27 RX ORDER — SODIUM CHLORIDE 9 MG/ML
250 INJECTION, SOLUTION INTRAVENOUS ONCE
Status: COMPLETED | OUTPATIENT
Start: 2017-01-27 | End: 2017-01-27

## 2017-01-27 RX ADMIN — Medication 360 MG: at 13:20

## 2017-01-27 RX ADMIN — SODIUM CHLORIDE 250 ML: 900 INJECTION, SOLUTION INTRAVENOUS at 13:25

## 2017-01-30 ENCOUNTER — RADIATION ONCOLOGY WEEKLY ASSESSMENT (OUTPATIENT)
Dept: RADIATION ONCOLOGY | Facility: HOSPITAL | Age: 67
End: 2017-01-30

## 2017-01-30 VITALS
DIASTOLIC BLOOD PRESSURE: 73 MMHG | WEIGHT: 143 LBS | HEART RATE: 64 BPM | RESPIRATION RATE: 16 BRPM | SYSTOLIC BLOOD PRESSURE: 123 MMHG | OXYGEN SATURATION: 97 % | HEIGHT: 65 IN | BODY MASS INDEX: 23.82 KG/M2 | TEMPERATURE: 98.7 F

## 2017-01-30 DIAGNOSIS — C50.411 MALIGNANT NEOPLASM OF UPPER-OUTER QUADRANT OF RIGHT FEMALE BREAST (HCC): Primary | ICD-10-CM

## 2017-01-30 PROCEDURE — 77412 RADIATION TX DELIVERY LVL 3: CPT | Performed by: RADIOLOGY

## 2017-01-30 NOTE — PROGRESS NOTES
Physician Weekly Management Note    Diagnosis:     Diagnosis Plan   1. Malignant neoplasm of upper-outer quadrant of right female breast         RT Details:  fx 9 imc, supracalv chest wall    Notes on Treatment course, Films, Medical progress:  Doing well, moderate fatigue, no erythema    Weekly Management:  Medication reviewed?   Yes  New medications given?   No  Problemlist reviewed?   Yes  Problem added?   No  Issues raised requiring referral to support services - task assigned to:  na    Technical aspects reviewed:  Weekly OBI approved?   Yes  Weekly port films approved?   Yes  Change requests noted on port film?   No  Patient setup and plan reviewed?   Yes    Chart Reviewed:  Continue current treatment plan?   Yes  Treatment plan change requested?   No  CBC reviewed?   Yes  Concurrent Chemo?   No    Objective     Toxicities:   fatigue     Review of Systems   Constitutional: Positive for fatigue.          Vitals:    01/30/17 1109   BP: 123/73   Pulse: 64   Resp: 16   Temp: 98.7 °F (37.1 °C)   SpO2: 97%       Current Status 1/20/2017   ECOG score 0       Physical Exam   Constitutional: She appears well-developed and well-nourished.   HENT:   Head: Normocephalic and atraumatic.   Pulmonary/Chest:               Problem Summary List    Diagnosis:     Diagnosis Plan   1. Malignant neoplasm of upper-outer quadrant of right female breast       Pathology:   Ductal adeno    Past Medical History   Diagnosis Date   • Arthritis    • Breast cancer      Invasive mammary cancer right breast associated with DCIS   • Depression    • History of cardiac cath 07/2014     10-20% LI    • History of migraine    • History of postmenopausal HRT      Post menopause hormones for 4 years   • Hx of radiation therapy    • Hyperlipidemia    • NSVT (nonsustained ventricular tachycardia)      RVOT tachycardia   • PONV (postoperative nausea and vomiting)    • Post-menopause    • Scarlet fever    • Urinary frequency    • VPC (ventricular  premature complex)          Past Surgical History   Procedure Laterality Date   • Back surgery       Discectomy   •  section     • Eye surgery     • Lumbar fusion     • Breast lumpectomy with sentinel node biopsy and axillary node dissection Left 10/5/2016     Procedure: LT BREAST LUMPECTOMY WITH MAMMO NEEDLE LOC W/ SENTINEL NODE BIOPSY AND POSS AXILLARY NODE DISSECTION;  Surgeon: Denis Cortes MD;  Location: [facility];  Service:    • Breast biopsy Left    • Breast biopsy       Stereotactic biopsey of right breast   • Breast surgery  2013     Lumpectomy   • Breast lumpectomy     • Breast lumpectomy Right    • Mastectomy Left 2016     Procedure: LEFT BREAST MASTECTOMY WITH LEFT AXILLARY NODE DISSECTION;  Surgeon: Denis Cortes MD;  Location: [facility];  Service:          Current Outpatient Prescriptions on File Prior to Visit   Medication Sig Dispense Refill   • atorvastatin (LIPITOR) 40 MG tablet Take 40 mg by mouth Every Evening.     • Calcium Carbonate (CALTRATE 600 PO) Take 1 tablet by mouth Every Morning.     • diphenoxylate-atropine (LOMOTIL) 2.5-0.025 MG per tablet Take 2 tablets by mouth 4 (Four) Times a Day As Needed for diarrhea.     • famciclovir (FAMVIR) 500 MG tablet Take 500 mg by mouth 3 (Three) Times a Day As Needed (cold sore).     • folic acid (FOLVITE) 400 MCG tablet Take 400 mcg by mouth Every Morning.     • hyoscyamine (ANASPAZ,LEVSIN) 0.125 MG tablet Take 0.125 mg by mouth Every 4 (Four) Hours As Needed (overactive bladder).     • meclizine (ANTIVERT) 25 MG tablet Take 25 mg by mouth 3 (Three) Times a Day As Needed.     • metoprolol succinate XL (TOPROL-XL) 25 MG 24 hr tablet Take 25 mg by mouth Every Evening. 1/2 tab each dose     • Multiple Vitamins-Minerals (MULTIVITAL PO) Take 1 tablet/day by mouth Every Morning.     • saccharomyces boulardii (FLORASTOR) 250 MG capsule Take 250 mg by mouth 2 (Two) Times a Day.       No current facility-administered  medications on file prior to visit.        Allergies   Allergen Reactions   • Nitrofurantoin Anaphylaxis   • Amoxicillin Diarrhea and Rash         Referring Provider:    No referring provider defined for this encounter.    Oncologist:  No primary care provider on file.      Seen and approved by:  Erika Osborn MD  01/30/2017

## 2017-01-31 PROCEDURE — 77412 RADIATION TX DELIVERY LVL 3: CPT | Performed by: RADIOLOGY

## 2017-01-31 PROCEDURE — 77417 THER RADIOLOGY PORT IMAGE(S): CPT | Performed by: RADIOLOGY

## 2017-02-01 ENCOUNTER — APPOINTMENT (OUTPATIENT)
Dept: RADIATION ONCOLOGY | Facility: HOSPITAL | Age: 67
End: 2017-02-01

## 2017-02-01 PROCEDURE — 77412 RADIATION TX DELIVERY LVL 3: CPT | Performed by: RADIOLOGY

## 2017-02-01 PROCEDURE — 77427 RADIATION TX MANAGEMENT X5: CPT | Performed by: RADIOLOGY

## 2017-02-02 PROCEDURE — 77412 RADIATION TX DELIVERY LVL 3: CPT | Performed by: RADIOLOGY

## 2017-02-02 PROCEDURE — 77336 RADIATION PHYSICS CONSULT: CPT | Performed by: RADIOLOGY

## 2017-02-03 PROCEDURE — 77412 RADIATION TX DELIVERY LVL 3: CPT | Performed by: RADIOLOGY

## 2017-02-03 PROCEDURE — 77321 SPECIAL TELETX PORT PLAN: CPT | Performed by: RADIOLOGY

## 2017-02-06 PROCEDURE — 77412 RADIATION TX DELIVERY LVL 3: CPT | Performed by: RADIOLOGY

## 2017-02-07 PROCEDURE — 77280 THER RAD SIMULAJ FIELD SMPL: CPT | Performed by: RADIOLOGY

## 2017-02-07 PROCEDURE — 77334 RADIATION TREATMENT AID(S): CPT | Performed by: RADIOLOGY

## 2017-02-07 PROCEDURE — 77412 RADIATION TX DELIVERY LVL 3: CPT | Performed by: RADIOLOGY

## 2017-02-07 PROCEDURE — 77417 THER RADIOLOGY PORT IMAGE(S): CPT | Performed by: RADIOLOGY

## 2017-02-07 PROCEDURE — 77300 RADIATION THERAPY DOSE PLAN: CPT | Performed by: RADIOLOGY

## 2017-02-08 ENCOUNTER — HOSPITAL ENCOUNTER (OUTPATIENT)
Dept: CARDIOLOGY | Facility: HOSPITAL | Age: 67
Discharge: HOME | End: 2017-02-08
Attending: INTERNAL MEDICINE | Admitting: INTERNAL MEDICINE

## 2017-02-08 ENCOUNTER — RADIATION ONCOLOGY WEEKLY ASSESSMENT (OUTPATIENT)
Dept: RADIATION ONCOLOGY | Facility: HOSPITAL | Age: 67
End: 2017-02-08

## 2017-02-08 ENCOUNTER — APPOINTMENT (OUTPATIENT)
Dept: PHYSICAL THERAPY | Facility: HOSPITAL | Age: 67
End: 2017-02-08

## 2017-02-08 VITALS
WEIGHT: 143 LBS | DIASTOLIC BLOOD PRESSURE: 66 MMHG | OXYGEN SATURATION: 99 % | SYSTOLIC BLOOD PRESSURE: 136 MMHG | HEART RATE: 72 BPM | BODY MASS INDEX: 23.82 KG/M2 | HEIGHT: 65 IN

## 2017-02-08 VITALS
RESPIRATION RATE: 16 BRPM | BODY MASS INDEX: 23.54 KG/M2 | OXYGEN SATURATION: 99 % | WEIGHT: 143 LBS | TEMPERATURE: 96.8 F | HEART RATE: 71 BPM | DIASTOLIC BLOOD PRESSURE: 65 MMHG | SYSTOLIC BLOOD PRESSURE: 135 MMHG

## 2017-02-08 DIAGNOSIS — C50.212 MALIGNANT NEOPLASM OF UPPER-INNER QUADRANT OF LEFT FEMALE BREAST (HCC): Primary | ICD-10-CM

## 2017-02-08 DIAGNOSIS — C50.212 MALIGNANT NEOPLASM OF UPPER-INNER QUADRANT OF LEFT FEMALE BREAST (HCC): ICD-10-CM

## 2017-02-08 LAB
BH CV ECHO MEAS - ACS: 1.9 CM
BH CV ECHO MEAS - AO MAX PG (FULL): 2.8 MMHG
BH CV ECHO MEAS - AO MAX PG: 6.1 MMHG
BH CV ECHO MEAS - AO MEAN PG (FULL): 1 MMHG
BH CV ECHO MEAS - AO MEAN PG: 3 MMHG
BH CV ECHO MEAS - AO ROOT AREA (BSA CORRECTED): 1.7
BH CV ECHO MEAS - AO ROOT AREA: 6.6 CM^2
BH CV ECHO MEAS - AO ROOT DIAM: 2.9 CM
BH CV ECHO MEAS - AO V2 MAX: 123 CM/SEC
BH CV ECHO MEAS - AO V2 MEAN: 78 CM/SEC
BH CV ECHO MEAS - AO V2 VTI: 28.8 CM
BH CV ECHO MEAS - AVA(I,A): 1.6 CM^2
BH CV ECHO MEAS - AVA(I,D): 1.6 CM^2
BH CV ECHO MEAS - AVA(V,A): 1.7 CM^2
BH CV ECHO MEAS - AVA(V,D): 1.7 CM^2
BH CV ECHO MEAS - BSA(HAYCOCK): 1.7 M^2
BH CV ECHO MEAS - BSA: 1.7 M^2
BH CV ECHO MEAS - BZI_BMI: 23.8 KILOGRAMS/M^2
BH CV ECHO MEAS - BZI_METRIC_HEIGHT: 165.1 CM
BH CV ECHO MEAS - BZI_METRIC_WEIGHT: 64.9 KG
BH CV ECHO MEAS - CONTRAST EF (2CH): 58 ML/M^2
BH CV ECHO MEAS - CONTRAST EF 4CH: 65.9 ML/M^2
BH CV ECHO MEAS - EDV(MOD-SP2): 88 ML
BH CV ECHO MEAS - EDV(MOD-SP4): 85 ML
BH CV ECHO MEAS - EDV(TEICH): 107.5 ML
BH CV ECHO MEAS - EF(CUBED): 73.1 %
BH CV ECHO MEAS - EF(MOD-SP2): 58 %
BH CV ECHO MEAS - EF(TEICH): 64.7 %
BH CV ECHO MEAS - ESV(MOD-SP2): 37 ML
BH CV ECHO MEAS - ESV(MOD-SP4): 29 ML
BH CV ECHO MEAS - ESV(TEICH): 37.9 ML
BH CV ECHO MEAS - FS: 35.4 %
BH CV ECHO MEAS - IVS/LVPW: 1.1
BH CV ECHO MEAS - IVSD: 1.1 CM
BH CV ECHO MEAS - LAT PEAK E' VEL: 11 CM/SEC
BH CV ECHO MEAS - LV DIASTOLIC VOL/BSA (35-75): 49.6 ML/M^2
BH CV ECHO MEAS - LV MASS(C)D: 181.9 GRAMS
BH CV ECHO MEAS - LV MASS(C)DI: 106.1 GRAMS/M^2
BH CV ECHO MEAS - LV MAX PG: 3.3 MMHG
BH CV ECHO MEAS - LV MEAN PG: 2 MMHG
BH CV ECHO MEAS - LV SYSTOLIC VOL/BSA (12-30): 16.9 ML/M^2
BH CV ECHO MEAS - LV V1 MAX: 90.2 CM/SEC
BH CV ECHO MEAS - LV V1 MEAN: 61.5 CM/SEC
BH CV ECHO MEAS - LV V1 VTI: 20.1 CM
BH CV ECHO MEAS - LVIDD: 4.8 CM
BH CV ECHO MEAS - LVIDS: 3.1 CM
BH CV ECHO MEAS - LVLD AP2: 8.5 CM
BH CV ECHO MEAS - LVLD AP4: 8.6 CM
BH CV ECHO MEAS - LVLS AP2: 7.7 CM
BH CV ECHO MEAS - LVLS AP4: 7.3 CM
BH CV ECHO MEAS - LVOT AREA (M): 2.3 CM^2
BH CV ECHO MEAS - LVOT AREA: 2.3 CM^2
BH CV ECHO MEAS - LVOT DIAM: 1.7 CM
BH CV ECHO MEAS - LVPWD: 1 CM
BH CV ECHO MEAS - MED PEAK E' VEL: 10 CM/SEC
BH CV ECHO MEAS - MR MAX PG: 89.9 MMHG
BH CV ECHO MEAS - MR MAX VEL: 474 CM/SEC
BH CV ECHO MEAS - MV A DUR: 0.1 SEC
BH CV ECHO MEAS - MV A MAX VEL: 89.8 CM/SEC
BH CV ECHO MEAS - MV DEC SLOPE: 295 CM/SEC^2
BH CV ECHO MEAS - MV DEC TIME: 0.22 SEC
BH CV ECHO MEAS - MV E MAX VEL: 63.2 CM/SEC
BH CV ECHO MEAS - MV E/A: 0.7
BH CV ECHO MEAS - MV MAX PG: 4.5 MMHG
BH CV ECHO MEAS - MV MEAN PG: 2 MMHG
BH CV ECHO MEAS - MV P1/2T MAX VEL: 67.6 CM/SEC
BH CV ECHO MEAS - MV P1/2T: 67.1 MSEC
BH CV ECHO MEAS - MV V2 MAX: 106 CM/SEC
BH CV ECHO MEAS - MV V2 MEAN: 59.2 CM/SEC
BH CV ECHO MEAS - MV V2 VTI: 28.2 CM
BH CV ECHO MEAS - MVA P1/2T LCG: 3.3 CM^2
BH CV ECHO MEAS - MVA(P1/2T): 3.3 CM^2
BH CV ECHO MEAS - MVA(VTI): 1.6 CM^2
BH CV ECHO MEAS - PA MAX PG (FULL): 1.8 MMHG
BH CV ECHO MEAS - PA MAX PG: 3.2 MMHG
BH CV ECHO MEAS - PA V2 MAX: 89.6 CM/SEC
BH CV ECHO MEAS - PULM A REVS DUR: 113 SEC
BH CV ECHO MEAS - PULM A REVS VEL: 29.1 CM/SEC
BH CV ECHO MEAS - PULM DIAS VEL: 42 CM/SEC
BH CV ECHO MEAS - PULM S/D: 1.2
BH CV ECHO MEAS - PULM SYS VEL: 49.9 CM/SEC
BH CV ECHO MEAS - PVA(V,A): 1.5 CM^2
BH CV ECHO MEAS - PVA(V,D): 1.5 CM^2
BH CV ECHO MEAS - QP/QS: 0.82
BH CV ECHO MEAS - RAP SYSTOLE: 3 MMHG
BH CV ECHO MEAS - RV MAX PG: 1.4 MMHG
BH CV ECHO MEAS - RV MEAN PG: 1 MMHG
BH CV ECHO MEAS - RV V1 MAX: 60.2 CM/SEC
BH CV ECHO MEAS - RV V1 MEAN: 41.5 CM/SEC
BH CV ECHO MEAS - RV V1 VTI: 16.4 CM
BH CV ECHO MEAS - RVOT AREA: 2.3 CM^2
BH CV ECHO MEAS - RVOT DIAM: 1.7 CM
BH CV ECHO MEAS - RVSP: 26 MMHG
BH CV ECHO MEAS - SI(AO): 110.9 ML/M^2
BH CV ECHO MEAS - SI(CUBED): 47.1 ML/M^2
BH CV ECHO MEAS - SI(LVOT): 26.6 ML/M^2
BH CV ECHO MEAS - SI(MOD-SP2): 29.7 ML/M^2
BH CV ECHO MEAS - SI(MOD-SP4): 32.6 ML/M^2
BH CV ECHO MEAS - SI(TEICH): 40.6 ML/M^2
BH CV ECHO MEAS - SUP REN AO DIAM: 1.6 CM
BH CV ECHO MEAS - SV(AO): 190.2 ML
BH CV ECHO MEAS - SV(CUBED): 80.8 ML
BH CV ECHO MEAS - SV(LVOT): 45.6 ML
BH CV ECHO MEAS - SV(MOD-SP2): 51 ML
BH CV ECHO MEAS - SV(MOD-SP4): 56 ML
BH CV ECHO MEAS - SV(RVOT): 37.2 ML
BH CV ECHO MEAS - SV(TEICH): 69.6 ML
BH CV ECHO MEAS - TAPSE (>1.6): 2.7 CM2
BH CV ECHO MEAS - TR MAX VEL: 236 CM/SEC
BH CV XLRA - TDI S': 11 CM/SEC
E/E' RATIO: 7
LEFT ATRIUM VOLUME INDEX: 16 ML/M2
LV EF 2D ECHO EST: 62 %

## 2017-02-08 PROCEDURE — 77412 RADIATION TX DELIVERY LVL 3: CPT | Performed by: RADIOLOGY

## 2017-02-08 PROCEDURE — 25010000002 PERFLUTREN (DEFINITY) 8.476 MG IN SODIUM CHLORIDE 10 ML INJECTION: Performed by: INTERNAL MEDICINE

## 2017-02-08 PROCEDURE — 93306 TTE W/DOPPLER COMPLETE: CPT | Performed by: INTERNAL MEDICINE

## 2017-02-08 PROCEDURE — C8929 TTE W OR WO FOL WCON,DOPPLER: HCPCS

## 2017-02-08 PROCEDURE — 77427 RADIATION TX MANAGEMENT X5: CPT | Performed by: RADIOLOGY

## 2017-02-08 RX ADMIN — PERFLUTREN 1.5 ML: 6.52 INJECTION, SUSPENSION INTRAVENOUS at 14:49

## 2017-02-08 NOTE — PROGRESS NOTES
Physician Weekly Management Note    Diagnosis:     Diagnosis Plan   1. 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   Yes  New medications given?   No  Problemlist reviewed?   Yes  Problem added?   No  Issues raised requiring referral to support services - task assigned to:  na    Technical aspects reviewed:  Weekly OBI approved?   Yes  Weekly port films approved?   Yes  Change requests noted on port film?   No  Patient setup and plan reviewed?   Yes    Chart Reviewed:  Continue current treatment plan?   Yes  Treatment plan change requested?   No  CBC reviewed?   Yes  Concurrent Chemo?   No    Objective     Toxicities:   Fatigue, grade 1/2 erythema     Review of Systems   Constitutional: Positive for fatigue. Negative for diaphoresis.   Skin: Positive for color change.          Vitals:    02/08/17 1128   BP: 135/65   Pulse: 71   Resp: 16   Temp: 96.8 °F (36 °C)   SpO2: 99%       Current Status 1/20/2017   ECOG score 0       Physical Exam   Constitutional: She appears well-developed and well-nourished.   Pulmonary/Chest:               Problem Summary List    Diagnosis:     Diagnosis Plan   1. Malignant neoplasm of upper-inner quadrant of left female breast       Pathology:   ductal    Past Medical History   Diagnosis Date   • Arthritis    • Breast cancer      Invasive mammary cancer right breast associated with DCIS   • Depression    • History of cardiac cath 07/2014     10-20% LI    • History of migraine    • History of postmenopausal HRT      Post menopause hormones for 4 years   • Hx of radiation therapy    • Hyperlipidemia    • NSVT (nonsustained ventricular tachycardia)      RVOT tachycardia   • PONV (postoperative  nausea and vomiting)    • Post-menopause    • Scarlet fever    • Urinary frequency    • VPC (ventricular premature complex)          Past Surgical History   Procedure Laterality Date   • Back surgery       Discectomy   •  section     • Eye surgery     • Lumbar fusion     • Breast lumpectomy with sentinel node biopsy and axillary node dissection Left 10/5/2016     Procedure: LT BREAST LUMPECTOMY WITH MAMMO NEEDLE LOC W/ SENTINEL NODE BIOPSY AND POSS AXILLARY NODE DISSECTION;  Surgeon: Denis Cortes MD;  Location: [facility];  Service:    • Breast biopsy Left    • Breast biopsy       Stereotactic biopsey of right breast   • Breast surgery  2013     Lumpectomy   • Breast lumpectomy     • Breast lumpectomy Right    • Mastectomy Left 2016     Procedure: LEFT BREAST MASTECTOMY WITH LEFT AXILLARY NODE DISSECTION;  Surgeon: Denis Cortes MD;  Location: [facility];  Service:          Current Outpatient Prescriptions on File Prior to Visit   Medication Sig Dispense Refill   • atorvastatin (LIPITOR) 40 MG tablet Take 40 mg by mouth Every Evening.     • Calcium Carbonate (CALTRATE 600 PO) Take 1 tablet by mouth Every Morning.     • diphenoxylate-atropine (LOMOTIL) 2.5-0.025 MG per tablet Take 2 tablets by mouth 4 (Four) Times a Day As Needed for diarrhea.     • famciclovir (FAMVIR) 500 MG tablet Take 500 mg by mouth 3 (Three) Times a Day As Needed (cold sore).     • folic acid (FOLVITE) 400 MCG tablet Take 400 mcg by mouth Every Morning.     • hyoscyamine (ANASPAZ,LEVSIN) 0.125 MG tablet Take 0.125 mg by mouth Every 4 (Four) Hours As Needed (overactive bladder).     • meclizine (ANTIVERT) 25 MG tablet Take 25 mg by mouth 3 (Three) Times a Day As Needed.     • metoprolol succinate XL (TOPROL-XL) 25 MG 24 hr tablet Take 25 mg by mouth Every Evening. 1/2 tab each dose     • Multiple Vitamins-Minerals (MULTIVITAL PO) Take 1 tablet/day by mouth Every Morning.     • saccharomyces boulardii  (FLORASTOR) 250 MG capsule Take 250 mg by mouth 2 (Two) Times a Day.       No current facility-administered medications on file prior to visit.        Allergies   Allergen Reactions   • Nitrofurantoin Anaphylaxis   • Amoxicillin Diarrhea and Rash         Referring Provider:    No referring provider defined for this encounter.    Oncologist:  No primary care provider on file.      Seen and approved by:  Erika Osborn MD  02/08/2017

## 2017-02-09 PROCEDURE — 77412 RADIATION TX DELIVERY LVL 3: CPT | Performed by: RADIOLOGY

## 2017-02-09 PROCEDURE — 77263 THER RADIOLOGY TX PLNG CPLX: CPT | Performed by: RADIOLOGY

## 2017-02-09 PROCEDURE — 77336 RADIATION PHYSICS CONSULT: CPT | Performed by: RADIOLOGY

## 2017-02-10 PROCEDURE — 77412 RADIATION TX DELIVERY LVL 3: CPT | Performed by: RADIOLOGY

## 2017-02-13 ENCOUNTER — RADIATION ONCOLOGY WEEKLY ASSESSMENT (OUTPATIENT)
Dept: RADIATION ONCOLOGY | Facility: HOSPITAL | Age: 67
End: 2017-02-13

## 2017-02-13 VITALS
RESPIRATION RATE: 16 BRPM | OXYGEN SATURATION: 98 % | HEART RATE: 66 BPM | DIASTOLIC BLOOD PRESSURE: 67 MMHG | SYSTOLIC BLOOD PRESSURE: 114 MMHG | TEMPERATURE: 98.6 F

## 2017-02-13 DIAGNOSIS — C50.411 MALIGNANT NEOPLASM OF UPPER-OUTER QUADRANT OF RIGHT FEMALE BREAST (HCC): Primary | ICD-10-CM

## 2017-02-13 DIAGNOSIS — C50.212 MALIGNANT NEOPLASM OF UPPER-INNER QUADRANT OF LEFT FEMALE BREAST (HCC): ICD-10-CM

## 2017-02-13 PROCEDURE — 77412 RADIATION TX DELIVERY LVL 3: CPT | Performed by: RADIOLOGY

## 2017-02-13 RX ORDER — HYOSCYAMINE SULFATE 0.125 MG
TABLET,DISINTEGRATING ORAL
COMMUNITY
Start: 2016-02-04 | End: 2017-03-10

## 2017-02-13 RX ORDER — MECLIZINE HYDROCHLORIDE 25 MG/1
TABLET ORAL
COMMUNITY
Start: 2015-09-23 | End: 2017-03-10

## 2017-02-13 RX ORDER — DIPHENOXYLATE HYDROCHLORIDE AND ATROPINE SULFATE 2.5; .025 MG/1; MG/1
TABLET ORAL
COMMUNITY
Start: 2016-05-11 | End: 2017-03-10

## 2017-02-13 RX ORDER — POTASSIUM CHLORIDE 750 MG/1
TABLET, EXTENDED RELEASE ORAL
COMMUNITY
Start: 2016-07-22 | End: 2017-03-10

## 2017-02-13 RX ORDER — METOPROLOL SUCCINATE 25 MG/1
TABLET, EXTENDED RELEASE ORAL
COMMUNITY
Start: 2016-05-16 | End: 2017-03-10

## 2017-02-13 RX ORDER — HYOSCYAMINE SULFATE 0.125 MG
TABLET ORAL
COMMUNITY
Start: 2014-07-16 | End: 2017-03-10

## 2017-02-13 RX ORDER — ATORVASTATIN CALCIUM 40 MG/1
TABLET, FILM COATED ORAL
COMMUNITY
Start: 2017-01-27 | End: 2017-03-10

## 2017-02-13 RX ORDER — LANOLIN ALCOHOL/MO/W.PET/CERES
CREAM (GRAM) TOPICAL
COMMUNITY
Start: 2013-05-28 | End: 2017-03-10

## 2017-02-13 RX ORDER — FAMCICLOVIR 500 MG/1
TABLET ORAL
COMMUNITY
Start: 2013-04-02 | End: 2017-03-10

## 2017-02-13 RX ORDER — PROCHLORPERAZINE MALEATE 10 MG
TABLET ORAL
COMMUNITY
Start: 2016-07-29 | End: 2017-03-10

## 2017-02-13 RX ORDER — ONDANSETRON HYDROCHLORIDE 8 MG/1
TABLET, FILM COATED ORAL
COMMUNITY
Start: 2016-04-28 | End: 2017-03-10

## 2017-02-13 NOTE — PROGRESS NOTES
Physician Weekly Management Note    Diagnosis:     Diagnosis Plan   1. Malignant neoplasm of upper-outer quadrant of right female breast     2. Malignant neoplasm of upper-inner quadrant of left female breast         RT Details:  fx 19/27 plus boost  Notes on Treatment course, Films, Medical progress:  Doing ok, fatigue, mild eyrthema over left chest wall, cont on    Weekly Management:  Medication reviewed?   Yes  New medications given?   No  Problemlist reviewed?   Yes  Problem added?   No  Issues raised requiring referral to support services - task assigned to:  saturnino    Technical aspects reviewed:  Weekly OBI approved?   Yes  Weekly port films approved?   Yes  Change requests noted on port film?   No  Patient setup and plan reviewed?   Yes    Chart Reviewed:  Continue current treatment plan?   Yes  Treatment plan change requested?   No  CBC reviewed?   Yes  Concurrent Chemo?   No    Objective     Toxicities:   Fatigue, grade 1 erythema     Review of Systems   Constitutional: Positive for fatigue.   HENT: Negative.    Respiratory: Negative.    Cardiovascular: Negative.    Skin: Positive for color change.          Vitals:    02/13/17 1122   BP: 114/67   Pulse: 66   Resp: 16   Temp: 98.6 °F (37 °C)   SpO2: 98%       Current Status 1/20/2017   ECOG score 0       Physical Exam   Constitutional: She appears well-developed and well-nourished.   Pulmonary/Chest:               Problem Summary List    Diagnosis:     Diagnosis Plan   1. Malignant neoplasm of upper-outer quadrant of right female breast     2. 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   • Depression    • History of cardiac cath 07/2014     10-20% LI    • History of migraine    • History of postmenopausal HRT      Post menopause hormones for 4 years   • Hx of  radiation therapy    • Hyperlipidemia    • NSVT (nonsustained ventricular tachycardia)      RVOT tachycardia   • PONV (postoperative nausea and vomiting)    • Post-menopause    • Scarlet fever    • Urinary frequency    • VPC (ventricular premature complex)          Past Surgical History   Procedure Laterality Date   • Back surgery       Discectomy   •  section     • Eye surgery     • Lumbar fusion     • Breast lumpectomy with sentinel node biopsy and axillary node dissection Left 10/5/2016     Procedure: LT BREAST LUMPECTOMY WITH MAMMO NEEDLE LOC W/ SENTINEL NODE BIOPSY AND POSS AXILLARY NODE DISSECTION;  Surgeon: Denis Cortes MD;  Location: [facility];  Service:    • Breast biopsy Left    • Breast biopsy       Stereotactic biopsey of right breast   • Breast surgery  2013     Lumpectomy   • Breast lumpectomy     • Breast lumpectomy Right    • Mastectomy Left 2016     Procedure: LEFT BREAST MASTECTOMY WITH LEFT AXILLARY NODE DISSECTION;  Surgeon: Denis Cortes MD;  Location: [facility];  Service:          Current Outpatient Prescriptions on File Prior to Visit   Medication Sig Dispense Refill   • atorvastatin (LIPITOR) 40 MG tablet Take 40 mg by mouth Every Evening.     • Calcium Carbonate (CALTRATE 600 PO) Take 1 tablet by mouth Every Morning.     • diphenoxylate-atropine (LOMOTIL) 2.5-0.025 MG per tablet Take 2 tablets by mouth 4 (Four) Times a Day As Needed for diarrhea.     • famciclovir (FAMVIR) 500 MG tablet Take 500 mg by mouth 3 (Three) Times a Day As Needed (cold sore).     • folic acid (FOLVITE) 400 MCG tablet Take 400 mcg by mouth Every Morning.     • hyoscyamine (ANASPAZ,LEVSIN) 0.125 MG tablet Take 0.125 mg by mouth Every 4 (Four) Hours As Needed (overactive bladder).     • meclizine (ANTIVERT) 25 MG tablet Take 25 mg by mouth 3 (Three) Times a Day As Needed.     • metoprolol succinate XL (TOPROL-XL) 25 MG 24 hr tablet Take 25 mg by mouth Every Evening. 1/2 tab each  dose     • saccharomyces boulardii (FLORASTOR) 250 MG capsule Take 250 mg by mouth 2 (Two) Times a Day.     • Multiple Vitamins-Minerals (MULTIVITAL PO) Take 1 tablet/day by mouth Every Morning.       No current facility-administered medications on file prior to visit.        Allergies   Allergen Reactions   • Nitrofurantoin Anaphylaxis   • Amoxicillin Diarrhea and Rash         Referring Provider:    No referring provider defined for this encounter.    Oncologist:  No primary care provider on file.      Seen and approved by:  Erika Osborn MD  02/13/2017

## 2017-02-14 PROCEDURE — 77412 RADIATION TX DELIVERY LVL 3: CPT | Performed by: RADIOLOGY

## 2017-02-14 PROCEDURE — 77417 THER RADIOLOGY PORT IMAGE(S): CPT | Performed by: RADIOLOGY

## 2017-02-15 PROCEDURE — 77412 RADIATION TX DELIVERY LVL 3: CPT | Performed by: RADIOLOGY

## 2017-02-15 PROCEDURE — 77427 RADIATION TX MANAGEMENT X5: CPT | Performed by: RADIOLOGY

## 2017-02-16 PROCEDURE — 77412 RADIATION TX DELIVERY LVL 3: CPT | Performed by: RADIOLOGY

## 2017-02-16 PROCEDURE — 77336 RADIATION PHYSICS CONSULT: CPT | Performed by: RADIOLOGY

## 2017-02-17 ENCOUNTER — INFUSION (OUTPATIENT)
Dept: ONCOLOGY | Facility: HOSPITAL | Age: 67
End: 2017-02-17

## 2017-02-17 VITALS
DIASTOLIC BLOOD PRESSURE: 44 MMHG | SYSTOLIC BLOOD PRESSURE: 122 MMHG | WEIGHT: 147.8 LBS | HEART RATE: 80 BPM | TEMPERATURE: 99 F | BODY MASS INDEX: 24.6 KG/M2

## 2017-02-17 DIAGNOSIS — C50.212 MALIGNANT NEOPLASM OF UPPER-INNER QUADRANT OF LEFT FEMALE BREAST (HCC): ICD-10-CM

## 2017-02-17 DIAGNOSIS — C50.212 MALIGNANT NEOPLASM OF UPPER-INNER QUADRANT OF LEFT FEMALE BREAST (HCC): Primary | ICD-10-CM

## 2017-02-17 LAB
BASOPHILS # BLD AUTO: 0.03 10*3/MM3 (ref 0–0.1)
BASOPHILS NFR BLD AUTO: 0.7 % (ref 0–1.1)
DEPRECATED RDW RBC AUTO: 44.2 FL (ref 37–49)
EOSINOPHIL # BLD AUTO: 0.07 10*3/MM3 (ref 0–0.36)
EOSINOPHIL NFR BLD AUTO: 1.7 % (ref 1–5)
ERYTHROCYTE [DISTWIDTH] IN BLOOD BY AUTOMATED COUNT: 11.7 % (ref 11.7–14.5)
HCT VFR BLD AUTO: 33.6 % (ref 34–45)
HGB BLD-MCNC: 11.2 G/DL (ref 11.5–14.9)
IMM GRANULOCYTES # BLD: 0.02 10*3/MM3 (ref 0–0.03)
IMM GRANULOCYTES NFR BLD: 0.5 % (ref 0–0.5)
LYMPHOCYTES # BLD AUTO: 0.93 10*3/MM3 (ref 1–3.5)
LYMPHOCYTES NFR BLD AUTO: 23 % (ref 20–49)
MCH RBC QN AUTO: 34.6 PG (ref 27–33)
MCHC RBC AUTO-ENTMCNC: 33.3 G/DL (ref 32–35)
MCV RBC AUTO: 103.7 FL (ref 83–97)
MONOCYTES # BLD AUTO: 0.38 10*3/MM3 (ref 0.25–0.8)
MONOCYTES NFR BLD AUTO: 9.4 % (ref 4–12)
NEUTROPHILS # BLD AUTO: 2.61 10*3/MM3 (ref 1.5–7)
NEUTROPHILS NFR BLD AUTO: 64.7 % (ref 39–75)
NRBC BLD MANUAL-RTO: 0 /100 WBC (ref 0–0)
PLATELET # BLD AUTO: 183 10*3/MM3 (ref 150–375)
PMV BLD AUTO: 8.6 FL (ref 8.9–12.1)
RBC # BLD AUTO: 3.24 10*6/MM3 (ref 3.9–5)
WBC NRBC COR # BLD: 4.04 10*3/MM3 (ref 4–10)

## 2017-02-17 PROCEDURE — 85025 COMPLETE CBC W/AUTO DIFF WBC: CPT | Performed by: INTERNAL MEDICINE

## 2017-02-17 PROCEDURE — 77412 RADIATION TX DELIVERY LVL 3: CPT | Performed by: RADIOLOGY

## 2017-02-17 PROCEDURE — 96413 CHEMO IV INFUSION 1 HR: CPT | Performed by: INTERNAL MEDICINE

## 2017-02-17 PROCEDURE — 36415 COLL VENOUS BLD VENIPUNCTURE: CPT | Performed by: INTERNAL MEDICINE

## 2017-02-17 PROCEDURE — 25010000002 TRASTUZUMAB PER 10 MG: Performed by: INTERNAL MEDICINE

## 2017-02-17 RX ORDER — SODIUM CHLORIDE 9 MG/ML
250 INJECTION, SOLUTION INTRAVENOUS ONCE
Status: COMPLETED | OUTPATIENT
Start: 2017-02-17 | End: 2017-02-17

## 2017-02-17 RX ADMIN — SODIUM CHLORIDE 250 ML: 900 INJECTION, SOLUTION INTRAVENOUS at 13:48

## 2017-02-17 RX ADMIN — Medication 360 MG: at 13:48

## 2017-02-20 ENCOUNTER — HOSPITAL ENCOUNTER (OUTPATIENT)
Dept: PHYSICAL THERAPY | Facility: HOSPITAL | Age: 67
Setting detail: THERAPIES SERIES
Discharge: HOME | End: 2017-02-20

## 2017-02-20 DIAGNOSIS — Z98.890 S/P LUMPECTOMY, LEFT BREAST: ICD-10-CM

## 2017-02-20 DIAGNOSIS — Z90.12 S/P LEFT MASTECTOMY: ICD-10-CM

## 2017-02-20 DIAGNOSIS — R29.3 ABNORMAL POSTURE: ICD-10-CM

## 2017-02-20 DIAGNOSIS — Z48.89 AFTERCARE FOLLOWING SURGERY: ICD-10-CM

## 2017-02-20 DIAGNOSIS — C50.212 MALIGNANT NEOPLASM OF UPPER-INNER QUADRANT OF LEFT FEMALE BREAST (HCC): Primary | ICD-10-CM

## 2017-02-20 DIAGNOSIS — C50.411 MALIGNANT NEOPLASM OF UPPER-OUTER QUADRANT OF RIGHT FEMALE BREAST (HCC): ICD-10-CM

## 2017-02-20 DIAGNOSIS — M25.612 STIFF SHOULDER, LEFT: ICD-10-CM

## 2017-02-20 DIAGNOSIS — M25.512 ACUTE PAIN OF LEFT SHOULDER: ICD-10-CM

## 2017-02-20 PROCEDURE — 77412 RADIATION TX DELIVERY LVL 3: CPT | Performed by: RADIOLOGY

## 2017-02-20 NOTE — PROGRESS NOTES
Physical Therapy Lymphedema Re-Evaluation  [facility]     Patient Name: Yuli Huang  : 1950  MRN: [id number]  Today's Date: 2017      Visit Date: 2017    Visit Dx:    ICD-10-CM ICD-9-CM   1. Malignant neoplasm of upper-inner quadrant of left female breast C50.212 174.2   2. S/P lumpectomy, left breast Z90.12 V45.89   3. Malignant neoplasm of upper-outer quadrant of right female breast C50.411 174.4   4. Aftercare following surgery Z48.89 V58.89   5. Acute pain of left shoulder M25.512 719.41   6. Stiff shoulder, left M25.612 719.51   7. S/P left mastectomy Z90.12 V45.71   8. 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   • Depression    • History of cardiac cath 2014     10-20% LI    • History of migraine    • History of postmenopausal HRT      Post menopause hormones for 4 years   • Hx of radiation therapy    • Hyperlipidemia    • NSVT (nonsustained ventricular tachycardia)      RVOT tachycardia   • PONV (postoperative nausea and vomiting)    • Post-menopause    • Scarlet fever    • Urinary frequency    • VPC (ventricular premature complex)         Past Surgical History   Procedure Laterality Date   • Back surgery       Discectomy   •  section     • Eye surgery     • Lumbar fusion     • Breast lumpectomy with sentinel node biopsy and axillary node dissection Left 10/5/2016     Procedure: LT BREAST LUMPECTOMY WITH MAMMO NEEDLE  LOC W/ SENTINEL NODE BIOPSY AND POSS AXILLARY NODE DISSECTION;  Surgeon: Denis Cortes MD;  Location: [facility];  Service:    • Breast biopsy Left 2005   • Breast biopsy       Stereotactic biopsey of right breast   • Breast surgery  06/04/2013     Lumpectomy   • Breast lumpectomy     • Breast lumpectomy Right    • Mastectomy Left 11/16/2016     Procedure: LEFT BREAST MASTECTOMY WITH LEFT AXILLARY NODE DISSECTION;  Surgeon: Denis Cortes MD;  Location: [facility];  Service:        Visit Dx:    ICD-10-CM ICD-9-CM   1. Malignant neoplasm of upper-inner quadrant of left female breast C50.212 174.2   2. S/P lumpectomy, left breast Z90.12 V45.89   3. Malignant neoplasm of upper-outer quadrant of right female breast C50.411 174.4   4. Aftercare following surgery Z48.89 V58.89   5. Acute pain of left shoulder M25.512 719.41   6. Stiff shoulder, left M25.612 719.51   7. S/P left mastectomy Z90.12 V45.71   8. Abnormal posture R29.3 781.92                 Lymphedema       02/20/17 1138          Subjective Comments    Subjective Comments I am doing ok. I have about 2 weeks left of radiation. I am just getting really tired. I did start the support group here and I am enjoying that. I am wearing my sleeve daily but I do think it is starting to wear out. My insurance did pay for it last year. My skin is starting to change but not too bad. I can get into the radiation position pretty good. I do have mild tightness in the left arm still but overall much better.   -SC      Subjective Pain    Able to rate subjective pain? yes  -SC      Pre-Treatment Pain Level 2  -SC      Post-Treatment Pain Level 2  -SC      Subjective Pain Comment FACES, radiation region left chest wall, tightness  -SC      Lymphedema Edema Assessment    Ptting Edema Category By grade out of 4  -SC      Pitting Edema + 1/4 (+1 of 4)   negative  -SC      Bioimpedence -3.8, WNL  -SC      Edema Assessment Comment very minimal radiation edema left lateral  "flank, soft, non pitting, non fibrotic, non weeping  -SC      Skin Changes/Observations    Skin Observations Comment very mild left axillary cording persisting left axilla into left volar forearm  -SC      Lymphedema Sensation    Lymphedema Sensation Reports LUE:  -SC      Lymphedema Sensation Tests light touch  -SC      Lymphedema Light Touch LUE:;mild impairment  -SC      Lymphedema Measurements    Measurement Type(s) Circumferential  -SC      Circumferential Areas Upper extremities  -SC      LUE Circumferential (cm)    Measurement Location 1 axilla  -SC      Left 1 26.5 cm  -SC      Measurement Location 2 15 cm above elbow  -SC      Left 2 26 cm  -SC      Measurement Location 3 10 cm above elbow  -SC      Left 3 25 cm  -SC      Measurement Location 4 5 cm above elbow  -SC      Left 4 23.6 cm  -SC      Measurement Location 5 elbow  -SC      Left 5 21.4 cm  -SC      Measurement Location 6 5 cm below elbow  -SC      Left 6 21 cm  -SC      Measurement Location 7 10 cm below elbow  -SC      Left 7 19 cm  -SC      Measurement Location 8 20 cm below elbow  -SC      Left 8 14.5 cm  -SC      Measurement Location 9 wrist  -SC      Left 9 14.5 cm  -SC      Measurement Location 10 midpalm  -SC      Left 10 16 cm  -SC      Measurement Location 11 MCPs  -SC      Left 11 16.4 cm  -SC      Measurement Location 12 total   -SC      Left 12 223.9 cm  -SC      Measurement Location 13 net difference R > L   -SC      Left 13 1.3 cm  -SC      Measurement Location 14 net decrease since initial evaluation  -SC      Left 14 2.3 cm  -SC      Compression/Skin Care    Compression/Skin Care compression garment;skin care  -SC      Skin Care other (comment)   instructed skin care during and post radiation  -SC      Compression Garment Comments Patient presents with OTS compression sleeve donned however \"too loose\" at axilla, sliding down arm, Orders sent for new garment purchase from [employer]. Will verify insurance coverage. Will order same " garment however will silicone band at axilla to assist with proper placement and overall maintenance of garment  -SC        User Key  (r) = Recorded By, (t) = Taken By, (c) = Cosigned By    Initials Name Provider Type    DOUGLAS Velasquez PT Physical Therapist                                Therapy Education       02/20/17 1134    Therapy Education    Given HEP;Symptoms/condition management;Pain management;Edema management;Other (comment)   compression garments, skin care with radiation, reconstruction options, bioimpedance test and results  -SC    Program New  -SC    How Provided Verbal;Demonstration  -SC    Provided to Patient  -SC    Level of Understanding Teach back education performed;Verbalized;Demonstrated  -SC      User Key  (r) = Recorded By, (t) = Taken By, (c) = Cosigned By    Initials Name Provider Type    DOUGLAS Velasquez PT Physical Therapist                  Exercises       02/20/17 113          Subjective Comments    Subjective Comments I am doing ok. I have about 2 weeks left of radiation. I am just getting really tired. I did start the support group here and I am enjoying that. I am wearing my sleeve daily but I do think it is starting to wear out. My insurance did pay for it last year. My skin is starting to change but not too bad. I can get into the radiation position pretty good. I do have mild tightness in the left arm still but overall much better.   -SC      Subjective Pain    Able to rate subjective pain? yes  -SC      Pre-Treatment Pain Level 2  -SC      Post-Treatment Pain Level 2  -SC      Subjective Pain Comment FACES, radiation region left chest wall, tightness  -SC        User Key  (r) = Recorded By, (t) = Taken By, (c) = Cosigned By    Initials Name Provider Type    DOUGLAS Velasquez PT Physical Therapist                              PT OP Goals       02/20/17 1139          PT Short Term Goals    STG 1 Patient independent and compliant with initial home exercise  program focused on diaphragmatic breathing, range of motion, flexibility to decrease edema and improve lymphatic flow for decreased edema and decreased risk of infection.   -SC      STG 1 Progress Met  -SC      STG 2 Patient demonstrate proper awareness of What is Lymphedema and 18 Steps of Prevention for improved prevention, management, care of symptoms and ease of transition to self-care of condition.   -SC      STG 2 Progress Met  -SC      STG 3 Patient independent and compliant with daytime OTS prevention compression garments as indicated for decreased risk of lymphedema and infection and safety with transition of self-care of condition.   -SC      STG 3 Progress Met  -SC      STG 4 Patient's PROM left shoulder flexion >/= 160 degrees for improved mobility, decreased axillary cording syndrome, decreased risk of edema, and improved posture, function for return to prior level of functioning  -SC      STG 4 Progress Met  -SC      Long Term Goals    LTG Date to Achieve 06/20/17  -SC      LTG 1 Patient independent and compliant with advanced Home Exercise Program for self-management of condition.   -SC      LTG 1 Progress Progressing  -SC      LTG 1 Progress Comments possible education CARE/[employer] once completes radiation  -SC      LTG 2 Patient demonstrate proper awareness of Care and Air Travel safety with compression/exercise as indicated for improved safety with travel and self-care of condition.   -SC      LTG 2 Progress Progressing  -SC      LTG 2 Progress Comments reviewed today  -SC      LTG 3 Patient demonstrate independence and compliance with proper skin care during/post radiation for improved mobility, decreased scar tissue, axillary cording and edema.  -SC      LTG 3 Progress Progressing  -SC      LTG 3 Progress Comments instructed today  -SC      LTG 4 Patient improved AROM left shoulder flexion in seated/standing positions >/= 160 degrees for improved function in home and community for safety with  return to prior level of functioning and transition to self-care of condition.   -SC      LTG 4 Progress Met  -SC      LTG 4 Progress Comments very mild axillary cording persisting left axilla to volar forearm however not limiting range of motion at this time  [facility] score </=25/100 on DASH for improved function and transition to self-management of condition.   -SC      LTG 5 Progress Progressing  [facility] Comments will reassess once completes radiation, noted improved function reported from patient, current complaint fatigue  -SC      Time Calculation    PT Goal Re-Cert Due Date 06/20/17  -SC        User Key  (r) = Recorded By, (t) = Taken By, (c) = Cosigned By    Initials Name Provider Type    SC Janet Velasquez, PT Physical Therapist                PT Assessment/Plan       02/20/17 5592          PT Assessment    Assessment Comments Mrs. Huang returns for 3 month bioimpedance assessment. She is currently receiving radiation therapy to left chest wall, axilla. She has 2 more weeks until radiation is completed. She is still interested in reconstruction. To discuss with Dr. De Leon again after completion of radiation. Possible TRAM flap for left. At this time she has mild skin changes with radiation, persisting mild axillary cording, and very mild edema with radiation left lateral flank. She is compliant with compression garment during radiation and was instructed in proper skin care today. Her range of motion is good and negative s/s of impingement or adhesive capsulitis left shoulder. Her current L-Dex score with bioimpedance is -3.8, which is within normal limits. She has had a decrease of 4.8 from previous measurement in Dec 2016, and an overall decrease of 8.9 from baseline reading in Nov 2016. She will benefit from 3 month reassessment to determine progress after radiation. Instructed to continue to wear compression garment during radiation and 2-4 weeks after completion.  Instructed post radiation skin care and slowly weaning out of garment if tolerates; however, to continue to use for prevention, pain and travel. Patient agrees. She is scheduled to return 06/20/17 for next bioimpedance assessment but instructed to contact PT during that time with any questions, concerns, or changes in symptoms. Patient was ordered new compression garment.   -SC      PT Plan    PT Frequency Other (comment)  -SC      Predicted Duration of Therapy Intervention (days/wks) 3 month bioimpedance f/u  -SC      PT Plan Comments return 06/20/17 for next bioimpedance assessment. Recert/DASH at that time.   -SC        User Key  (r) = Recorded By, (t) = Taken By, (c) = Cosigned By    Initials Name Provider Type    SC Janet Velasquez, PT Physical Therapist                 Time Calculation:   Start Time: 1138  Stop Time: 1216  Time Calculation (min): 38 min                   Janet Martinez, PT  2/20/2017

## 2017-02-21 PROCEDURE — 77280 THER RAD SIMULAJ FIELD SMPL: CPT | Performed by: RADIOLOGY

## 2017-02-21 PROCEDURE — 77412 RADIATION TX DELIVERY LVL 3: CPT | Performed by: RADIOLOGY

## 2017-02-21 PROCEDURE — 77334 RADIATION TREATMENT AID(S): CPT | Performed by: RADIOLOGY

## 2017-02-22 PROCEDURE — 77427 RADIATION TX MANAGEMENT X5: CPT | Performed by: RADIOLOGY

## 2017-02-22 PROCEDURE — 77331 SPECIAL RADIATION DOSIMETRY: CPT | Performed by: RADIOLOGY

## 2017-02-22 PROCEDURE — 77412 RADIATION TX DELIVERY LVL 3: CPT | Performed by: RADIOLOGY

## 2017-02-23 PROCEDURE — 77412 RADIATION TX DELIVERY LVL 3: CPT | Performed by: RADIOLOGY

## 2017-02-23 PROCEDURE — 77280 THER RAD SIMULAJ FIELD SMPL: CPT | Performed by: RADIOLOGY

## 2017-02-23 PROCEDURE — 77336 RADIATION PHYSICS CONSULT: CPT | Performed by: RADIOLOGY

## 2017-02-23 PROCEDURE — 77334 RADIATION TREATMENT AID(S): CPT | Performed by: RADIOLOGY

## 2017-02-24 PROCEDURE — 77412 RADIATION TX DELIVERY LVL 3: CPT | Performed by: RADIOLOGY

## 2017-02-24 PROCEDURE — 77300 RADIATION THERAPY DOSE PLAN: CPT | Performed by: RADIOLOGY

## 2017-02-27 ENCOUNTER — RADIATION ONCOLOGY WEEKLY ASSESSMENT (OUTPATIENT)
Dept: RADIATION ONCOLOGY | Facility: HOSPITAL | Age: 67
End: 2017-02-27

## 2017-02-27 VITALS
OXYGEN SATURATION: 98 % | TEMPERATURE: 98.2 F | SYSTOLIC BLOOD PRESSURE: 117 MMHG | DIASTOLIC BLOOD PRESSURE: 67 MMHG | HEART RATE: 72 BPM | RESPIRATION RATE: 16 BRPM

## 2017-02-27 DIAGNOSIS — C50.212 MALIGNANT NEOPLASM OF UPPER-INNER QUADRANT OF LEFT FEMALE BREAST (HCC): Primary | ICD-10-CM

## 2017-02-27 RX ORDER — DICLOFENAC SODIUM 75 MG/1
75 TABLET, DELAYED RELEASE ORAL
COMMUNITY
End: 2018-01-23

## 2017-02-27 NOTE — PROGRESS NOTES
Physician Weekly Management Note    Diagnosis:     Diagnosis Plan   1. Malignant neoplasm of upper-inner quadrant of left female breast         RT Details:  fx 29/33 on scar boost  Notes on Treatment course, Films, Medical progress:  She reports increased redness, blistering and pain over left chest wall this weekend.  She has 2 small areas of moist desquamation <1cm each in area of scar boost, silvadene rx, hold tx today and tomorrow. Resume wed,     Weekly Management:  Medication reviewed?   Yes  New medications given?   No  Problemlist reviewed?   Yes  Problem added?   No  Issues raised requiring referral to support services - task assigned to:  saturnino    Technical aspects reviewed:  Weekly OBI approved?   Yes  Weekly port films approved?   Yes  Change requests noted on port film?   No  Patient setup and plan reviewed?   Yes    Chart Reviewed:  Continue current treatment plan?   Yes  Treatment plan change requested?   No  CBC reviewed?   Yes  Concurrent Chemo?   No    Objective     Toxicities:   Grade 2/3 dermatitis     Review of Systems   Constitutional: Positive for fatigue.   Skin: Positive for color change and rash.          Vitals:    02/27/17 1054   BP: 117/67   Pulse: 72   Resp: 16   Temp: 98.2 °F (36.8 °C)   SpO2: 98%       Current Status 1/20/2017   ECOG score 0       Physical Exam   Constitutional: She appears well-developed and well-nourished.   Pulmonary/Chest:               Problem Summary List    Diagnosis:     Diagnosis Plan   1. Malignant neoplasm of upper-inner quadrant of left female breast       Pathology:  ductal    Past Medical History   Diagnosis Date   • Arthritis    • Breast cancer      Invasive mammary cancer right breast associated with DCIS   • Depression    • History of cardiac cath 07/2014     10-20% LI    • History of migraine    • History of postmenopausal HRT      Post menopause hormones for 4 years   • Hx of radiation therapy    • Hyperlipidemia    • NSVT (nonsustained ventricular  tachycardia)      RVOT tachycardia   • PONV (postoperative nausea and vomiting)    • Post-menopause    • Scarlet fever    • Urinary frequency    • VPC (ventricular premature complex)          Past Surgical History   Procedure Laterality Date   • Back surgery       Discectomy   •  section     • Eye surgery     • Lumbar fusion     • Breast lumpectomy with sentinel node biopsy and axillary node dissection Left 10/5/2016     Procedure: LT BREAST LUMPECTOMY WITH MAMMO NEEDLE LOC W/ SENTINEL NODE BIOPSY AND POSS AXILLARY NODE DISSECTION;  Surgeon: Denis Cortes MD;  Location: [facility];  Service:    • Breast biopsy Left    • Breast biopsy       Stereotactic biopsey of right breast   • Breast surgery  2013     Lumpectomy   • Breast lumpectomy     • Breast lumpectomy Right    • Mastectomy Left 2016     Procedure: LEFT BREAST MASTECTOMY WITH LEFT AXILLARY NODE DISSECTION;  Surgeon: Denis Cortes MD;  Location: [facility];  Service:          Current Outpatient Prescriptions on File Prior to Visit   Medication Sig Dispense Refill   • atorvastatin (LIPITOR) 40 MG tablet Take 40 mg by mouth Every Evening.     • atorvastatin (LIPITOR) 40 MG tablet TAKE 1 TABLET NIGHTLY     • Calcium Carbonate (CALTRATE 600 PO) Take 1 tablet by mouth Every Morning.     • Calcium Carbonate 1500 (600 CA) MG tablet Take  by mouth.     • diphenoxylate-atropine (LOMOTIL) 2.5-0.025 MG per tablet Take 2 tablets by mouth 4 (Four) Times a Day As Needed for diarrhea.     • diphenoxylate-atropine (LOMOTIL) 2.5-0.025 MG per tablet Take  by mouth.     • famciclovir (FAMVIR) 500 MG tablet Take 500 mg by mouth 3 (Three) Times a Day As Needed (cold sore).     • famciclovir (FAMVIR) 500 MG tablet Take  by mouth.     • folic acid (FOLVITE) 400 MCG tablet Take 400 mcg by mouth Every Morning.     • folic acid (FOLVITE) 400 MCG tablet Take  by mouth.     • hyoscyamine (ANASPAZ,LEVSIN) 0.125 MG tablet Take 0.125 mg by mouth Every  4 (Four) Hours As Needed (overactive bladder).     • hyoscyamine (ANASPAZ,LEVSIN) 0.125 MG tablet Take  by mouth.     • hyoscyamine sulfate (ANASPAZ) 0.125 MG tablet dispersible disintegrating tablet USE 1 TABLET EVERY 4 HOURS AS NEEDED FOR CRAMPING AS DIRECTED     • meclizine (ANTIVERT) 25 MG tablet Take 25 mg by mouth 3 (Three) Times a Day As Needed.     • meclizine (ANTIVERT) 25 MG tablet Take  by mouth.     • metoprolol succinate XL (TOPROL-XL) 25 MG 24 hr tablet Take 25 mg by mouth Every Evening. 1/2 tab each dose     • metoprolol succinate XL (TOPROL-XL) 25 MG 24 hr tablet TAKE 1/2 TABLET DAILY     • MULTIPLE VITAMIN PO Take  by mouth.     • Multiple Vitamins-Minerals (MULTIVITAL PO) Take 1 tablet/day by mouth Every Morning.     • ondansetron (ZOFRAN) 8 MG tablet Take  by mouth.     • potassium chloride (K-DUR,KLOR-CON) 10 MEQ CR tablet Take  by mouth.     • prochlorperazine (COMPAZINE) 10 MG tablet Take  by mouth.     • saccharomyces boulardii (FLORASTOR) 250 MG capsule Take 250 mg by mouth 2 (Two) Times a Day.       No current facility-administered medications on file prior to visit.        Allergies   Allergen Reactions   • Nitrofurantoin Anaphylaxis   • Amoxicillin Diarrhea and Rash         Referring Provider:    No referring provider defined for this encounter.    Oncologist:  No primary care provider on file.      Seen and approved by:  Erika Osborn MD  02/27/2017

## 2017-03-01 ENCOUNTER — APPOINTMENT (OUTPATIENT)
Dept: RADIATION ONCOLOGY | Facility: HOSPITAL | Age: 67
End: 2017-03-01

## 2017-03-01 PROCEDURE — 77412 RADIATION TX DELIVERY LVL 3: CPT

## 2017-03-01 PROCEDURE — 77412 RADIATION TX DELIVERY LVL 3: CPT | Performed by: RADIOLOGY

## 2017-03-02 PROCEDURE — 77336 RADIATION PHYSICS CONSULT: CPT | Performed by: RADIOLOGY

## 2017-03-02 PROCEDURE — 77412 RADIATION TX DELIVERY LVL 3: CPT | Performed by: RADIOLOGY

## 2017-03-02 PROCEDURE — 77412 RADIATION TX DELIVERY LVL 3: CPT

## 2017-03-03 PROCEDURE — 77412 RADIATION TX DELIVERY LVL 3: CPT | Performed by: RADIOLOGY

## 2017-03-03 PROCEDURE — 77412 RADIATION TX DELIVERY LVL 3: CPT

## 2017-03-06 ENCOUNTER — RADIATION ONCOLOGY WEEKLY ASSESSMENT (OUTPATIENT)
Dept: RADIATION ONCOLOGY | Facility: HOSPITAL | Age: 67
End: 2017-03-06

## 2017-03-06 VITALS
OXYGEN SATURATION: 100 % | DIASTOLIC BLOOD PRESSURE: 69 MMHG | TEMPERATURE: 98 F | RESPIRATION RATE: 16 BRPM | SYSTOLIC BLOOD PRESSURE: 109 MMHG | HEART RATE: 60 BPM

## 2017-03-06 DIAGNOSIS — C50.411 MALIGNANT NEOPLASM OF UPPER-OUTER QUADRANT OF RIGHT FEMALE BREAST (HCC): Primary | ICD-10-CM

## 2017-03-06 DIAGNOSIS — C50.212 MALIGNANT NEOPLASM OF UPPER-INNER QUADRANT OF LEFT FEMALE BREAST (HCC): ICD-10-CM

## 2017-03-06 PROCEDURE — 77412 RADIATION TX DELIVERY LVL 3: CPT

## 2017-03-06 PROCEDURE — 77300 RADIATION THERAPY DOSE PLAN: CPT

## 2017-03-06 PROCEDURE — 77300 RADIATION THERAPY DOSE PLAN: CPT | Performed by: RADIOLOGY

## 2017-03-06 PROCEDURE — 77412 RADIATION TX DELIVERY LVL 3: CPT | Performed by: RADIOLOGY

## 2017-03-06 NOTE — PROGRESS NOTES
Physician Weekly Management Note    Diagnosis:     Diagnosis Plan   1. Malignant neoplasm of upper-outer quadrant of right female breast     2. Malignant neoplasm of upper-inner quadrant of left female breast         RT Details:  fx 32/33 boost    Notes on Treatment course, Films, Medical progress:  Doing ok, skin erythema, no breakdown, plan for pab for tomorrow, using silvadene for small areas of moist desquamation,no pain    Weekly Management:  Medication reviewed?   Yes  New medications given?   No  Problemlist reviewed?   Yes  Problem added?   No  Issues raised requiring referral to support services - task assigned to:  saturnino    Technical aspects reviewed:  Weekly OBI approved?   Yes  Weekly port films approved?   Yes  Change requests noted on port film?   No  Patient setup and plan reviewed?   Yes    Chart Reviewed:  Continue current treatment plan?   Yes  Treatment plan change requested?   No  CBC reviewed?   No  Concurrent Chemo?   No    Objective     Toxicities:   Grade 2 erythema     Review of Systems   Constitutional: Positive for fatigue.   HENT: Negative.    Skin: Positive for color change and rash.          There were no vitals filed for this visit.    Current Status 1/20/2017   ECOG score 0       Physical Exam   Constitutional: She appears well-developed and well-nourished.   Pulmonary/Chest:               Problem Summary List    Diagnosis:     Diagnosis Plan   1. Malignant neoplasm of upper-outer quadrant of right female breast     2. Malignant neoplasm of upper-inner quadrant of left female breast       Pathology:   Ductal adenoca    Past Medical History   Diagnosis Date   • Arthritis    • Breast cancer      Invasive mammary cancer right breast associated with DCIS   • Depression    • History of cardiac cath 07/2014     10-20% LI    • History of migraine    • History of postmenopausal HRT      Post menopause hormones for 4 years   • Hx of radiation therapy    • Hyperlipidemia    • NSVT (nonsustained  ventricular tachycardia)      RVOT tachycardia   • PONV (postoperative nausea and vomiting)    • Post-menopause    • Scarlet fever    • Urinary frequency    • VPC (ventricular premature complex)          Past Surgical History   Procedure Laterality Date   • Back surgery       Discectomy   •  section     • Eye surgery     • Lumbar fusion     • Breast lumpectomy with sentinel node biopsy and axillary node dissection Left 10/5/2016     Procedure: LT BREAST LUMPECTOMY WITH MAMMO NEEDLE LOC W/ SENTINEL NODE BIOPSY AND POSS AXILLARY NODE DISSECTION;  Surgeon: Denis Cortes MD;  Location: [facility];  Service:    • Breast biopsy Left    • Breast biopsy       Stereotactic biopsey of right breast   • Breast surgery  2013     Lumpectomy   • Breast lumpectomy     • Breast lumpectomy Right    • Mastectomy Left 2016     Procedure: LEFT BREAST MASTECTOMY WITH LEFT AXILLARY NODE DISSECTION;  Surgeon: Denis Cortes MD;  Location: [facility];  Service:          Current Outpatient Prescriptions on File Prior to Visit   Medication Sig Dispense Refill   • atorvastatin (LIPITOR) 40 MG tablet Take 40 mg by mouth Every Evening.     • atorvastatin (LIPITOR) 40 MG tablet TAKE 1 TABLET NIGHTLY     • Calcium Carbonate (CALTRATE 600 PO) Take 1 tablet by mouth Every Morning.     • Calcium Carbonate 1500 (600 CA) MG tablet Take  by mouth.     • diclofenac (VOLTAREN) 75 MG EC tablet Take 75 mg by mouth.     • diphenoxylate-atropine (LOMOTIL) 2.5-0.025 MG per tablet Take 2 tablets by mouth 4 (Four) Times a Day As Needed for diarrhea.     • diphenoxylate-atropine (LOMOTIL) 2.5-0.025 MG per tablet Take  by mouth.     • famciclovir (FAMVIR) 500 MG tablet Take 500 mg by mouth 3 (Three) Times a Day As Needed (cold sore).     • famciclovir (FAMVIR) 500 MG tablet Take  by mouth.     • folic acid (FOLVITE) 400 MCG tablet Take 400 mcg by mouth Every Morning.     • folic acid (FOLVITE) 400 MCG tablet Take  by mouth.        • hyoscyamine (ANASPAZ,LEVSIN) 0.125 MG tablet Take 0.125 mg by mouth Every 4 (Four) Hours As Needed (overactive bladder).     • hyoscyamine (ANASPAZ,LEVSIN) 0.125 MG tablet Take  by mouth.     • hyoscyamine sulfate (ANASPAZ) 0.125 MG tablet dispersible disintegrating tablet USE 1 TABLET EVERY 4 HOURS AS NEEDED FOR CRAMPING AS DIRECTED     • meclizine (ANTIVERT) 25 MG tablet Take 25 mg by mouth 3 (Three) Times a Day As Needed.     • meclizine (ANTIVERT) 25 MG tablet Take  by mouth.     • metoprolol succinate XL (TOPROL-XL) 25 MG 24 hr tablet Take 25 mg by mouth Every Evening. 1/2 tab each dose     • metoprolol succinate XL (TOPROL-XL) 25 MG 24 hr tablet TAKE 1/2 TABLET DAILY     • MULTIPLE VITAMIN PO Take  by mouth.     • Multiple Vitamins-Minerals (MULTIVITAL PO) Take 1 tablet/day by mouth Every Morning.     • ondansetron (ZOFRAN) 8 MG tablet Take  by mouth.     • potassium chloride (K-DUR,KLOR-CON) 10 MEQ CR tablet Take  by mouth.     • prochlorperazine (COMPAZINE) 10 MG tablet Take  by mouth.     • saccharomyces boulardii (FLORASTOR) 250 MG capsule Take 250 mg by mouth 2 (Two) Times a Day.     • silver sulfadiazine (SILVADENE) 1 % cream Apply  topically 2 (Two) Times a Day. 50 g 1     No current facility-administered medications on file prior to visit.        Allergies   Allergen Reactions   • Nitrofurantoin Anaphylaxis   • Amoxicillin Diarrhea and Rash         Referring Provider:    No referring provider defined for this encounter.    Oncologist:  No primary care provider on file.      Seen and approved by:  Erika Osborn MD  03/06/2017

## 2017-03-07 PROCEDURE — 77412 RADIATION TX DELIVERY LVL 3: CPT | Performed by: RADIOLOGY

## 2017-03-07 PROCEDURE — 77412 RADIATION TX DELIVERY LVL 3: CPT

## 2017-03-07 PROCEDURE — 77417 THER RADIOLOGY PORT IMAGE(S): CPT | Performed by: RADIOLOGY

## 2017-03-07 PROCEDURE — 77334 RADIATION TREATMENT AID(S): CPT

## 2017-03-07 PROCEDURE — 77280 THER RAD SIMULAJ FIELD SMPL: CPT | Performed by: RADIOLOGY

## 2017-03-07 PROCEDURE — 77334 RADIATION TREATMENT AID(S): CPT | Performed by: RADIOLOGY

## 2017-03-07 PROCEDURE — 77280 THER RAD SIMULAJ FIELD SMPL: CPT

## 2017-03-07 PROCEDURE — 77336 RADIATION PHYSICS CONSULT: CPT | Performed by: RADIOLOGY

## 2017-03-07 PROCEDURE — 77336 RADIATION PHYSICS CONSULT: CPT

## 2017-03-07 PROCEDURE — 77417 THER RADIOLOGY PORT IMAGE(S): CPT

## 2017-03-08 ENCOUNTER — TELEPHONE (OUTPATIENT)
Dept: OTHER | Facility: HOSPITAL | Age: 67
End: 2017-03-08

## 2017-03-08 NOTE — TELEPHONE ENCOUNTER
Call placed to patient RE: open referral to survivorship clinic from Dr Osborn. I spoke to the patient and reviewed purpose and goals of survivorship visit as well as what to expect. She was interested. Patient scheduled for Tuesday March 28, 2017 at 2:30pm. Reminder card mailed.

## 2017-03-10 ENCOUNTER — INFUSION (OUTPATIENT)
Dept: ONCOLOGY | Facility: HOSPITAL | Age: 67
End: 2017-03-10

## 2017-03-10 ENCOUNTER — OFFICE VISIT (OUTPATIENT)
Dept: ONCOLOGY | Facility: CLINIC | Age: 67
End: 2017-03-10

## 2017-03-10 VITALS
HEART RATE: 65 BPM | HEIGHT: 65 IN | BODY MASS INDEX: 25.19 KG/M2 | OXYGEN SATURATION: 98 % | WEIGHT: 151.2 LBS | TEMPERATURE: 98.4 F | DIASTOLIC BLOOD PRESSURE: 60 MMHG | RESPIRATION RATE: 16 BRPM | SYSTOLIC BLOOD PRESSURE: 110 MMHG

## 2017-03-10 DIAGNOSIS — C50.212 MALIGNANT NEOPLASM OF UPPER-INNER QUADRANT OF LEFT FEMALE BREAST (HCC): ICD-10-CM

## 2017-03-10 DIAGNOSIS — C50.212 MALIGNANT NEOPLASM OF UPPER-INNER QUADRANT OF LEFT FEMALE BREAST (HCC): Primary | ICD-10-CM

## 2017-03-10 LAB
BASOPHILS # BLD AUTO: 0.02 10*3/MM3 (ref 0–0.1)
BASOPHILS NFR BLD AUTO: 0.5 % (ref 0–1.1)
DEPRECATED RDW RBC AUTO: 45.2 FL (ref 37–49)
EOSINOPHIL # BLD AUTO: 0.09 10*3/MM3 (ref 0–0.36)
EOSINOPHIL NFR BLD AUTO: 2.5 % (ref 1–5)
ERYTHROCYTE [DISTWIDTH] IN BLOOD BY AUTOMATED COUNT: 12.1 % (ref 11.7–14.5)
HCT VFR BLD AUTO: 31.9 % (ref 34–45)
HGB BLD-MCNC: 11.1 G/DL (ref 11.5–14.9)
HOLD SPECIMEN: NORMAL
IMM GRANULOCYTES # BLD: 0.02 10*3/MM3 (ref 0–0.03)
IMM GRANULOCYTES NFR BLD: 0.5 % (ref 0–0.5)
LYMPHOCYTES # BLD AUTO: 0.73 10*3/MM3 (ref 1–3.5)
LYMPHOCYTES NFR BLD AUTO: 20.1 % (ref 20–49)
MCH RBC QN AUTO: 35.7 PG (ref 27–33)
MCHC RBC AUTO-ENTMCNC: 34.8 G/DL (ref 32–35)
MCV RBC AUTO: 102.6 FL (ref 83–97)
MONOCYTES # BLD AUTO: 0.4 10*3/MM3 (ref 0.25–0.8)
MONOCYTES NFR BLD AUTO: 11 % (ref 4–12)
NEUTROPHILS # BLD AUTO: 2.38 10*3/MM3 (ref 1.5–7)
NEUTROPHILS NFR BLD AUTO: 65.4 % (ref 39–75)
NRBC BLD MANUAL-RTO: 0 /100 WBC (ref 0–0)
PLATELET # BLD AUTO: 183 10*3/MM3 (ref 150–375)
PMV BLD AUTO: 8.7 FL (ref 8.9–12.1)
RBC # BLD AUTO: 3.11 10*6/MM3 (ref 3.9–5)
WBC NRBC COR # BLD: 3.64 10*3/MM3 (ref 4–10)

## 2017-03-10 PROCEDURE — 85025 COMPLETE CBC W/AUTO DIFF WBC: CPT | Performed by: INTERNAL MEDICINE

## 2017-03-10 PROCEDURE — G0463 HOSPITAL OUTPT CLINIC VISIT: HCPCS | Performed by: NURSE PRACTITIONER

## 2017-03-10 PROCEDURE — 96413 CHEMO IV INFUSION 1 HR: CPT | Performed by: INTERNAL MEDICINE

## 2017-03-10 PROCEDURE — 25010000002 TRASTUZUMAB PER 10 MG: Performed by: INTERNAL MEDICINE

## 2017-03-10 PROCEDURE — 99213 OFFICE O/P EST LOW 20 MIN: CPT | Performed by: INTERNAL MEDICINE

## 2017-03-10 RX ORDER — SODIUM CHLORIDE 9 MG/ML
250 INJECTION, SOLUTION INTRAVENOUS ONCE
Status: CANCELLED | OUTPATIENT
Start: 2017-03-31

## 2017-03-10 RX ORDER — SODIUM CHLORIDE 9 MG/ML
250 INJECTION, SOLUTION INTRAVENOUS ONCE
Status: CANCELLED | OUTPATIENT
Start: 2017-03-10

## 2017-03-10 RX ORDER — SODIUM CHLORIDE 9 MG/ML
250 INJECTION, SOLUTION INTRAVENOUS ONCE
Status: COMPLETED | OUTPATIENT
Start: 2017-03-10 | End: 2017-03-10

## 2017-03-10 RX ADMIN — SODIUM CHLORIDE 250 ML: 900 INJECTION, SOLUTION INTRAVENOUS at 15:01

## 2017-03-10 RX ADMIN — Medication 410 MG: at 15:08

## 2017-03-10 NOTE — PROGRESS NOTES
[facility] GROUP OUTPATIENT CLINIC FOLLOW UP VISIT    REASON FOR FOLLOW-UP:      Malignant neoplasm of upper-inner quadrant of left female breast    Staging form: Breast, AJCC V7      Clinical stage from 4/14/2016: Stage IIIC (T3, N3b, M0) - Signed by Jordon Bonilla MD on 4/28/2016    Malignant neoplasm of upper-outer quadrant of right female breast    Staging form: Breast, AJCC V7      Clinical stage from 3/25/2016: Stage IA (rT1a, N0, M0) - Signed by Erika Osbron MD on 3/25/2016    HISTORY OF PRESENT ILLNESS:  Yuli Huang is a 66 y.o. female who returns today for follow up of the above issue.      She completed radiation therapy earlier this week.  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  She required some dose delays with radiation. She tolerates Herceptin well.  Peripheral neuropathy persists.  It may be a little bit better in her fingers but is stable in her feet.  She is using a vitamin B complex vitamin.    Hair growth on her scalp is very slow and she is now on biotin.    ONCOLOGIC HISTORY:     Malignant neoplasm of upper-outer quadrant of right female breast    5/13/2013 Initial Diagnosis        5/13/2013 Biopsy    Biopsy:  Ductal carcinoma in situ.  ER 20%, OR 1-4%.  Her2 not done.      6/4/2013 Surgery    Lumpectomy by Dr. Denis Cortes.  2mm invasive cancer associated with DCIS.  ER/OR negative on the DCIS; unable to be performed on the invasive component.        6/18/2013 Surgery    [place] lymph node biopsy:  3 nodes negative.      7/10/2013 - 8/23/2013 Radiation    Radiation therapy to the right breast.        Malignant neoplasm of upper-inner quadrant of left female breast    3/31/2016 Imaging    Ultrasound left breast:  10 o'clock position, 3x2cm mass with a 2cm axillary lymph node.      4/7/2016 Biopsy    Ultrasound-guided biopsy of the left breast and axillary lymph node.  ER/OR negative, HER-2 overexpressed.  Grade 2.        4/19/2016 Imaging    PET scan:   Left breast mass demonstrate significant metabolic activity.  There is a single 1.7 cm lymph node at the left axilla which  demonstrates significant metabolic activity for its size. There is also  a subcentimeter node in the left internal mammary chain which  nonetheless demonstrates discernible activity. I suspect an early erasto  metastasis. No further evidence for metastatic disease is present.      4/21/2016 Imaging    Breast MRI:  1. Biopsy-proven malignancy in the left breast extending from the 8:30  o'clock through 1 o'clock positions and measuring up to 7.6 cm in  greatest dimension. Adjacent satellite clumped foci of enhancement are  also noted in the upper outer and lower outer quadrants. Left axillary  adenopathy is also appreciated.  2. There are no findings suspicious for malignancy in the right breast.  Postbreast conservation therapy changes of the right breast are noted.      4/29/2016 - 8/15/2016 Chemotherapy    Neoadjuvant TCHP      9/8/2016 Imaging    MRI breasts:  1. Findings consistent with significant interval response to neoadjuvant  chemotherapy in the left breast. However, there remains some scattered  stippled foci of enhancement that are asymmetric in the left breast  compared to the right breast and they are from the 8 o'clock through 12  o'clock positions in distribution. This corresponds to a region that was  previously occupied by considerable neoplastic disease/malignancy, thus  microscopic multifocal disease is suspected. There has been resolution  of left axillary adenopathy.  2. There are no findings suspicious for malignancy in the right breast.      10/5/2016 Surgery    Lumpectomy with sentinel lymph node biopsy:  Breast:  DCIS spanning 3.3cm.  ER/SC negative.  Multifocal.  No invasive carcinoma  1 of 2 sentinel nodes positive for carcinoma measuring 1.1mm without extracapsular extension.        11/16/2016 Surgery    Left modified radical mastectomy with axillary lymph node  "sampling by Dr. Cortes.  High grade DCIS with margins <1mm.  10 benign axillary lymph nodes.      1/9/2017 Imaging    PET scan:  IMPRESSION:  The low-level activity at the left axillary surgical bed may  represent residual postoperative change. Residual malignancy in this  region cannot be entirely excluded. There is otherwise no abnormal  hypermetabolic activity or convincing evidence for metastatic disease.      1/18/2017 - 3/7/2017 Radiation              PAST MEDICAL, SURGICAL, FAMILY, AND SOCIAL HISTORIES WERE REVIEWED WITH THE PATIENT AND IN THE ELECTRONIC MEDICAL RECORD, AND WERE UPDATED IF INDICATED.    ALLERGIES:  Allergies   Allergen Reactions   • Nitrofurantoin Anaphylaxis   • Amoxicillin Diarrhea and Rash       MEDICATIONS:  The medication list has been reviewed with the patient by the medical assistant, and the list has been updated in the electronic medical record, which I reviewed.  Medication dosages and frequencies were confirmed to be accurate.    REVIEW OF SYSTEMS:  PAIN:  See Vital Signs below.  GENERAL:  No fevers, chills, night sweats, or unintended weight loss.  SKIN:  See HPI.  Alopecia on the scalp persists.  HEME/LYMPH:  No abnormal bleeding.  No palpable lymphadenopathy.  EYES:  No vision changes or diplopia.  ENT:  No mucositis or ulcers  RESPIRATORY:  No cough, shortness of breath, hemoptysis, or wheezing.  CARDIOVASCULAR:  No chest pain, palpitations, orthopnea, or dyspnea on exertion.  GASTROINTESTINAL: Did not complain of any issues today  GENITOURINARY:  No dysuria or hematuria.  MUSCULOSKELETAL:  Improving range of motion of the left shoulder.    NEUROLOGIC:  Numbness in her fingertips and on the bottom of her feet  PSYCHIATRIC:  Anxiety improved.    Vitals:    03/10/17 1412   BP: 110/60   Pulse: 65   Resp: 16   Temp: 98.4 °F (36.9 °C)   TempSrc: Oral   SpO2: 98%   Weight: 151 lb 3.2 oz (68.6 kg)   Height: 65.35\" (166 cm)   PainSc: 0-No pain       PHYSICAL EXAMINATION:  GENERAL:  " Well-developed well-nourished female; awake, alert and oriented, in no acute distress.  SKIN: Significant erythema with areas of crusting on the left chest from radiation  HEAD:  Normocephalic, atraumatic. Alopecia   EYES:  Pupils equal, round and reactive to light.  Extraocular movements intact.  Conjunctivae normal.  EARS:  Hearing intact.  NOSE:  Septum midline.  No excoriations or nasal discharge.  MOUTH:  No stomatitis or ulcers.  Lips are normal.  THROAT:  Oropharynx without lesions or exudates.  NECK:  Supple with good range of motion; no thyromegaly or masses; no JVD or bruits.  LYMPHATICS:  No cervical, supraclavicular, axillary, or inguinal lymphadenopathy.  CHEST:  Lungs are clear to auscultation bilaterally.  No wheezes, rales, or rhonchi.  A Mediport is present as benign in appearance in the right subclavian area.  HEART:  Regular rate; normal rhythm.  No murmurs, gallops or rubs.  ABDOMEN:  Soft, non-tender, non-distended.  Normal active bowel sounds.  No organomegaly  EXTREMITIES:  No clubbing, cyanosis, or edema.  NEUROLOGICAL:  No focal neurologic deficits.  BREASTS:  Not examined today.    DIAGNOSTIC DATA:  Lab Results   Component Value Date    WBC 3.64 (L) 03/10/2017    HGB 11.1 (L) 03/10/2017    HCT 31.9 (L) 03/10/2017    .6 (H) 03/10/2017     03/10/2017       Lab Results   Component Value Date    GLUCOSE 137 (H) 11/09/2016    BUN 17 11/09/2016    CREATININE 0.69 11/09/2016    EGFRIFNONA 86 11/09/2016    EGFRIFAFRI  09/16/2016      Comment:      <15 Indicative of kidney failure.    BCR 24.6 11/09/2016    CO2 26.9 11/09/2016    CALCIUM 8.8 11/09/2016    ALBUMIN 3.80 11/09/2016    LABIL2 1.7 11/09/2016    AST 22 11/09/2016    ALT 22 11/09/2016     Lab Results   Component Value Date    GLUCOSE 137 (H) 11/09/2016    CALCIUM 8.8 11/09/2016     11/09/2016    K 3.7 11/09/2016    CO2 26.9 11/09/2016     11/09/2016    BUN 17 11/09/2016    CREATININE 0.69 11/09/2016    EGFRIFAFRI   09/16/2016      Comment:      <15 Indicative of kidney failure.    EGFRIFNONA 86 11/09/2016    BCR 24.6 11/09/2016    ANIONGAP 13.1 11/09/2016     Imaging: None reviewed    ASSESSMENT:  This is a 66 y.o. female with:  1.  History of stage I carcinoma of the right breast.  The biopsy initially showed DCIS.  There was a tiny invasive component on the surgical specimen.  This was minimally hormone receptor positive and HER-2 was not able to be tested.  She had radiation following her surgery but no medical therapy.  2.  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  She completed 6 cycles of neoadjuvant chemotherapy with TCH P followed by a lumpectomy on 10/5/2016.  No residual invasive carcinoma in the breast but there was extensive DCIS with very close margins.  One lymph node was positive for metastatic carcinoma.  We discussed her case at the multidisciplinary breast conference.  She had a mammogram that showed some persistent suspicious calcifications and therefore on 11/16/2016 had a left modified radical mastectomy with axillary lymph node sampling.  High-grade DCIS was present but no invasive carcinoma and no lymph nodes were positive.  Margins were very close.  We proceed with Herceptin today and every 3 weeks through April.  She had a PET scan that is negative.  She completed radiation therapy.  3.  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  4.  Radiation dermatitis:  Per radiation oncology.  She continues topical therapy.    PLAN:   1.  Herceptin today and then in three and six weeks.  Herceptin will be complete in six weeks.  2.  Continue topical therapy for radiation dermatitis  3.  Continue a vitamin B complex vitamin  4.  I will see her back in 6 weeks for her last Herceptin

## 2017-03-28 ENCOUNTER — OFFICE VISIT (OUTPATIENT)
Dept: OTHER | Facility: HOSPITAL | Age: 67
End: 2017-03-28

## 2017-03-28 VITALS
DIASTOLIC BLOOD PRESSURE: 75 MMHG | OXYGEN SATURATION: 95 % | TEMPERATURE: 100.3 F | RESPIRATION RATE: 18 BRPM | BODY MASS INDEX: 24.99 KG/M2 | HEIGHT: 65 IN | WEIGHT: 150 LBS | HEART RATE: 99 BPM | SYSTOLIC BLOOD PRESSURE: 123 MMHG

## 2017-03-28 DIAGNOSIS — C50.212 MALIGNANT NEOPLASM OF UPPER-INNER QUADRANT OF LEFT FEMALE BREAST (HCC): ICD-10-CM

## 2017-03-28 DIAGNOSIS — C50.411 MALIGNANT NEOPLASM OF UPPER-OUTER QUADRANT OF RIGHT FEMALE BREAST (HCC): Primary | ICD-10-CM

## 2017-03-28 PROCEDURE — 99215 OFFICE O/P EST HI 40 MIN: CPT | Performed by: NURSE PRACTITIONER

## 2017-03-28 RX ORDER — BIMATOPROST 0.01 %
DROPS OPHTHALMIC (EYE)
COMMUNITY
Start: 2016-12-19 | End: 2018-05-24 | Stop reason: SDUPTHER

## 2017-03-28 NOTE — PROGRESS NOTES
[facility] CANCER SURVIVORSHIP VISIT NOTE    Yuli Huang is a pleasant 66 y.o.   female seen today at the request of Anurag in our Cancer Survivorship Clinic. The patient has a history of right breast invasive ductal carcinoma (Stage IA) diagnosed June 2013 and left breast invasive ductal carcinoma (clinical stage IIB) diagnosed 2016. Here today to review survivorship care plan.    TREATMENT HISTORY:     RIGHT BREAST  1. 5/13/2013 initial diagnosis per stereotactic right breast biopsy. Initially showed DCIS.  2. 6/4/2013 completed right upper outer quadrant lumpectomy and SNB with Dr Cortes. 0 of 3 nodes negative. Per Dr Bonilla a tiny invasive component on the surgical specimen was noted. Patient was seen initially at [place] and eventually switched care to Dr Bonilla.  Joint decision with patient and Dr Bonilla to not pursue antiestrogen therapy due to minimal estrogen positivity of biopsy and negative lumpectomy specimen  3. 8/23/2013 completed radiation therapy with Dr Osborn total dose of 6040cGy     LEFT BREAST  1. Patient seen by Dr Osborn after normal screening mammogram and suspicious mass noted on exam.   2. 4/7/2016 ultrasound guided biopsy of left 10 o'clock mass and left axillary lymph node biopsy - Both of these showed evidence of carcinoma of the breast. Hormone receptors on the breast mass showed estrogen and progesterone receptors are negative and HER-2 is 3+ by IHC.  3. Genetic testing was considered however the patient's sister, diagnosed with breast cancer at age 68 had genetic testing completed. Her sister allowed this to be reviewed by genetics at [place]. Patient received genetic counseling there and ultimately it was decided testing would not be of benefit.    4. Neoadjuvant systemic therapy with Dr Bonilla TCHP. Developed neutropenia and neuropathy with taxotere/carboplatin, however with dose reductions she was able to complete 6 of 6 cycles 4/26/2016-8/15/2016. Received  Neulasta support.At completion of treatment kelly no longer had palpable disease in the breast or in the left axilla.4. 10/5/2016 left lumpectomy with sentinel node biopsy with Dr Cortes however post-lumpectomy revealed incomplete removal of microcalcifications and a positive sentinel node.  5. 11/16/2016 left modified radical mastectomy with left axillary dissection completed with Dr Cortes  6. Radiation completed 3/7/2017 with 6060cGy to left chest wall and lymph nodes  7. Scheduled to finish maintenance Herceptin 4/21/2017 (most recent Echo showed EF of 62% on 2/8/2017)  8. Considering left TRAM flap reconstruction with Dr De Leon after ok from Dr Osborn      Allergies as of 03/28/2017 - Mumtaz as Reviewed 03/28/2017   Allergen Reaction Noted   • Nitrofurantoin Anaphylaxis 03/01/2016   • Amoxicillin Diarrhea and Rash 03/01/2016       MEDICATIONS:  I have reviewed the medication list with the patient and I updated it in the electronic medical record. Medication dosages and frequencies were confirmed to be accurate with the patient.      Review of Systems   Constitutional: Positive for fatigue. Negative for appetite change, fever and unexpected weight change.   HENT: Positive for congestion, rhinorrhea and trouble swallowing. Negative for mouth sores, nosebleeds, sneezing and sore throat.         Clear nasal discharge   Respiratory: Positive for cough. Negative for choking, chest tightness and shortness of breath.         Non-productive cough   Cardiovascular: Negative for chest pain, palpitations and leg swelling.   Gastrointestinal: Negative for abdominal pain, blood in stool, constipation, diarrhea and nausea.   Genitourinary: Positive for frequency. Negative for dysuria and hematuria.   Musculoskeletal: Negative for arthralgias and joint swelling.   Skin: Negative for rash.        Healing left chest radiation site   Neurological: Positive for dizziness, light-headedness and numbness. Negative for speech  "difficulty, weakness and headaches.        Dizzy/lightheaded upon waking this morning  Peripheral neuropathy of hands and feet   Psychiatric/Behavioral: Positive for decreased concentration. Negative for confusion, dysphoric mood and sleep disturbance. The patient is not nervous/anxious.          /75  Pulse 99  Temp 100.3 °F (37.9 °C) (Oral)   Resp 18  Ht 65\" (165.1 cm)  Wt 150 lb (68 kg)  LMP  (LMP Unknown)  SpO2 95% Comment: room air  BMI 24.96 kg/m2    Pain Score    03/28/17 1734   PainSc: 0-No pain       FATIGUE: 8/10    Physical Exam   Constitutional: She is oriented to person, place, and time. She appears well-developed and well-nourished. She is cooperative.   Temp 100.3 today   HENT:   Head: Normocephalic and atraumatic.   Mouth/Throat: Mucous membranes are normal.   Eyes: Conjunctivae and lids are normal.   Cardiovascular: Normal rate, regular rhythm, normal heart sounds and intact distal pulses.    Pulmonary/Chest: Effort normal and breath sounds normal. No accessory muscle usage. No tachypnea and no bradypnea. No respiratory distress.   Abdominal: Soft. Normal appearance and bowel sounds are normal. There is no tenderness.   Neurological: She is alert and oriented to person, place, and time.   Skin: Skin is warm, dry and intact. No cyanosis. Nails show no clubbing.   Psychiatric: She has a normal mood and affect. Her speech is normal and behavior is normal. Judgment and thought content normal.         SURVIVORSHIP DISCUSSION HELD TODAY:   Primary patient goal(s): wellness     Management of disease and treatment related effects:  Overall reports many of her persistent symptoms are slowly resolving. All open/healing areas from left breat radiation dermatitis have healed. She continues to moisturizer to affected area. Peripheral neuropathy improvements have reached a plateau with the numbness persisting in hands and feet. Acknowledged this may not fully resolve and advised gabapentin may be an " option she could consider with Dr Bonilla for chronic management. Rates fatigue generally 8/10 but states she was able to mow her lawn last Sunday for the first time since diagnosis. Discussed continuing to gradually increase physical activity to combat this and increase stamina. Continues to follow with lymphedema clinic but so far no issues.     Psychosocial and spiritual: Affirms she did have some family issues regarding her son- she did see Janet Moyer, oncology MSW here and was referred to family counseling in the community for follow up. Her son attended with her and those issues are now resolved. Denies depression/anxiety currently. Affirms strong support from her sisters and her ex- and his spouse who are also good friends. She does not identify with a particular michaela tradition but affirms being spiritual and a relationship with God. She just completed the 8 week Sharing our Stories support group here and feels positively about that.              Maintaining personal wellness: Today reports clear runny nose and non-productive cough beginning one week ago. Initially thought was allergies, however low grade fever 100.3 in clinic today. Denies body aches, sore throat, difficulty swallowing, productive cough. Denies difficulty urinating/dysureia. Generally tired this morning but felt better after she started her day. States she desires to maintain her current weight and resume fitness goals at [facility]. We discussed current BMI of 25 and recommended target of 20-25 for wellness, cardiovascular health and risk reduction for cancer recurrence and prevention. We discussed availability of oncology registered dietician, Paulina Brothers and referral offered. Patient declines at this time. Paulina's contact info and handout describing recommended dietary modifications emphasizing whole foods, plant based diet provided. Reviewed screenings and surveillance plan.         After review of the Survivorship Treatment  Summary & Care Plan, the patient verbalized understanding of reccommendations for follow-up. As outlined in the care plan, they were advised to continue with follow-up care in accordance with the NCCN surveillance guidelines while transitioning back to their primary care physician for continued general preventive and healthcare needs. We discussed the importance of healthy eating, exercise and weight management. We reviewed current guidelines for routine screening of other cancers.     A copy of the Survivorship Treatment Summary & Care Plan for Ms. Kim (see below) was provided to her and forwarded to the providers identified on the care team.    50 minutes out of 60 minutes face-to-face spent counseling patient extensively on treatment summary, surveillance for recurrence, late and long-term effects of disease and treatment, intimacy and self-image, preventative screening for other cancers, achieving/maintatining healthy BMI and link to risk reduction, adherence to current therapies, management of anxiety/uncertainty and self care strategies.     This Cancer Care Plan will facilitate cancer care following active treatment. It may include important contact information, a treatment summary, recommendations for follow-up care testing, a directory of support services and resources, and other information. [1]                    Care Plan for Breast Cancer RIGHT BREAST                      Prepared by:    EVERT Sorto DNP on 3/24/2017 at [facility]                             General Information         Patient Name MELVIN KIM   Medical record number [id number]   Phone (home) [phone]   Date of birth 1950   Age at diagnosis 62   Support contact EVERT SORTO, [phone]         Care team    Hematologist/oncologist RAPHAEL KAPLAN MD   General/breast surgeon NICOLÁS CAMPBELL MD, [phone]   Radiation oncologist NIRALI VYAS MD, [phone]   Plastic surgeon    Primary  care physician    OB-GYN    Nurse/nurse practitioner    Mental health/    Coordination of care                        Background Information         Family history Multiple relatives   Other health concerns Depression, Migraine headaches, Osteoarthritis, Urinary frequency, scarlet fever, nonsustained ventricular tachycardia, post-op nausea/vomiting, high cholesterol, history of cardiac catheterization,     Echocardiogram or MUGA result EF = 57%   Additional comments 5/13/2013 Steroetactic right breast biopsy     FAMILY HISTORY: Mother (Breast, age 81); Sister (Breast, age 68)                       Right breast    Definitive breast surgery Right upper outer quadrant lumpectomy on 6/4/2013   [place] node biopsy Yes, 6/18/2013   Axillary dissection No   Lymph  nodes 0 positive, 3 removed   Tumor type & stage Invasive ductal carcinoma,   T1a,   N0   Pathologic stage Stage IA   Estrogen receptor status Positive   Progesterone receptor status Positive   HER2 status Negative                 Treatment Plan & Summary         Patient's height 65 in       Pre-treatment Post-treatment   Patient's weight 175 lb 151 lb   Patient's BSA 1.91 m² 1.77 m²   Patient's BMI 29.1 25.1   Comments Post-menopausal prior to treatment  Oral contraceptive use x5 years  Hormone replacement therapy x4 years. In discussion wi Dr Bonilla, did not persue antiestrogren therapy due to minimal estrogen receptor positivity on one biopsy and negative on the lumpectomy specimen.                     Treatment on clinical trial No       Therapeutic agents # cycles % dose reduction                        Biologic therapy Administered: No   Radiation therapy Planned: Yes,  Completed: 8/23/2013, dose: 6040cGy (5040cGy to right breast; 1000cGy tumor bed boost)   Disease status at end of treatment No evidence of disease                  Follow-Up Care            UPON SCREENING, THE PATIENT HAS BEEN DETERMINED TO HAVE THE FOLLOWING ISSUE(S):              Patients - Please consult your health care provider for medical advice specific to you before using any medications, supplements, or other products, and before beginning any lifestyle program.                    Chief patient concern Wellness (e.g., diet, exercise, smoking cessation)                  This Cancer Care Plan will facilitate cancer care following active treatment. It may include important contact information, a treatment summary, recommendations for follow-up care testing, a directory of support services and resources, and other information. [1]                  Care Plan for Breast Cancer LEFT BREAST                    Prepared by:    Erika Gill DNP, APRN on 3/24/2017 at [facility]                          General Information         Patient Name MELVIN KIM   Medical record number [id number]   Phone (home) [phone]   Date of birth 1950   Age at diagnosis 65   Support contact          Care team    Hematologist/oncologist RAPHAEL KAPLAN MD , [phone]   General/breast surgeon NICOLÁS CAMPBELL MD, [phone]   Radiation oncologist ERIKA VYAS MD, [phone]   Plastic surgeon    Primary care physician ILYA DRAKE MD, [phone]    OB-GYN LINDSEY RODGERS MD, [phone]   Nurse/nurse practitioner    Mental health/    Coordination of care    Cardiologist SYMONE DUNCAN MD, [phone]                      Background Information         Genetic testing Considered   Echocardiogram or MUGA result EF = 57%   Additional comments Clinical stage IIB    4/7/2016 ultrasound guided biopsy of left 10 o'clock mass  4/7/2016 left axillary lymph node biopsy (positive node)  10/5/2016 left lumpectomy with sentinel node biopsy  11/16/2016 left modified radical mastectomy with left axillary dissection    Did not have genetic testing but sister was tested and had no mutations                  Left breast    Definitive breast surgery Left modified radical  mastectomy on 11/16/2016   [place] node biopsy Yes, 11/16/2016   Axillary dissection Yes, 11/16/2016   Lymph  nodes 1 positive, 12 removed   Tumor type & stage Infiltrating ductal carcinoma,   T2,   N1   Estrogen receptor status Negative   Progesterone receptor status Negative   HER2 status Positive               Treatment Plan & Summary         Patient's height 65 in       Pre-treatment Post-treatment   Patient's weight 152 lb 151 lb   Patient's BSA 1.78 m² 1.77 m²   Patient's BMI 25.3 25.1   Comments Post-menopausal prior to treatment Granix given Cycle 1 days 8-11  Neulasta body injector given Cycles 2-6 day 1  No palpable disease after completion pf pre-operative chemo    Left TRAM flap reconstruction planned with Dr De Leon                  Regimen TCH/P    TCH/P  Docetaxel (Taxotere) 75 mg/m2 iv d1 q3w x 6 cycles  Carboplatin (Paraplatin) AUC 6 iv d1 q3w x 6 cycles  Perjeta (Pertuzumab) 840mg iv d1 q3w x 6cycles  Trastuzumab (Herceptin) 4 mg/kg loading dose followed by 2 mg/kg iv qw during chemotherapy; then 6 mg/kg iv q3w?for a total of 1 year     Therapeutic agents:  Docetaxel (Taxotere)   Carboplatin (Paraplatin)   Trastuzumab (Herceptin)   Perjeta (Pertuzumab)            Treatment on clinical trial No       Therapeutic agents # cycles % dose reduction   Docetaxel 6 of 6 Cycle 2 reduced 10%; cycle 6 reduced total of 20%   Carboplatin 6 of 6 AUC 5, decreased to AUC 4 during cycle 6    Trastuzumab 6 of 6     Perjeta 6 of 6    Trastuzumab maintenance 7 of 8 cycles completed  Will complete last infusion on 4/21/2017                       Biologic therapy Planned: Yes, Administered: Yes   Ejection fraction Pre-biologic therapy: 57% (4/27/2016),   Most recent: 62% (2/8/2017)   Biologic therapy treatment dates 4/29/2016 -4/21/2017   Pre-operative chemo administered Yes   Chemotherapy treatment period 4/29/2016 -8/15/2016   Possible side effects of regimen Anemia, Cardiac symptoms, Fatigue, Hair loss, Infertility,  Joint pain, Low blood count, Menopause symptoms, Muscle aches, Nausea/vomiting, Neuropathy, Reddening of the hands and feet, Sores in mouth   Reconstruction Planned: Yes, Completed: No   Radiation therapy Planned: Yes,  Completed: 3/7/2017, dose: 6060cGy in 33 fractions to chest wall and regional lymph nodes   Growth factor given Yes   Serious toxicities during  treatment Dehydration, Mucositis, Neutropenia, Nausea, diarrhea, fatigue, neuropathy   Hospitalization for toxicities No   Neurotoxicity impairing activities No   Reason for stopping chemotherapy Completed therapy   Endocrine therapy None                Follow-Up Care            UPON SCREENING, THE PATIENT HAS BEEN DETERMINED TO HAVE THE FOLLOWING ISSUE(S):             Patients - Please consult your health care provider for medical advice specific to you before using any medications, supplements, or other products, and before beginning any lifestyle program.                  Chief patient concern Wellness (e.g., diet, exercise, smoking cessation)               Needs/Concerns Suggested intervention(s)   Depression Referral for psychiatric care or counseling and/or initiation of pharmacotherapy    Counseling is available at the [facility]   Clinical breast exam status Annual clinical breast exam needed   Mammography status Annual mammography needed   Concerns about risk of recurrence You need to know that these feelings are normal, and they won’t cause the cancer to come back.    • Referral for cognitive behavioral therapy or counseling    • Referral to support group, e.g. American Cancer Society (www.cancer.org, 794.547.7740), Cancer Support Community (www.wellnessandcancer.org, 899.491.9338)   Screening for other cancers Breast cancer diagnosis does not change screening practices for other cancers:    • If 50 or older: Colonoscopy every 10 years if first one normal    • If 30 or older: Pap smear every 3 years as long as the last 3 were normal,  every year if abnormal   Fatigue   • Regular physical activity (e.g., walking 20 minutes daily)    • Evaluation for hypothyroidism, anemia, depression   Memory problems and/or confusion   • You should know that 25% of cancer patients have cognitive dysfunction after treatment and it usually gets better over time    • Rule out depression, sleep disturbance   Lymphedema   • Referral to lymphedema physical therapy specialist for lymphatic massage    • Weight training    • Maintenance of healthy weight   Decreased range of motion in shoulder Referral to physical therapy   Genetic risk Given compelling family history of breast cancer:    • Referral for genetic testing (BRCA)    • Referral for counseling by a medical oncologist or genetic counselor    • Referral to gynecologist for ovarian cancer risk-evaluation   Employment, health insurance, finances The financial challenges that people with cancer and their families face are very real.     For hospital bills, you may want to talk with a hospital financial counselor. You may be able   to work out a monthly payment plan or even get a reduced rate. You may also want to stay   in touch with your insurance company to make sure costs are covered.    For information about resources that are available you can go to the NCI database, “Organizations That Offer Support Services,” at <http://www.cancer.gov>, search terms “financial assistance.”     Or call toll-free 8-515-7-CANCER (1-392.849.9537) to ask for help.     Wellness (e.g., diet, exercise, smoking cessation)   • Maintenance of body weight as weight gain is associated with recurrence    • Regular physical activity (e.g., walking 20 minutes daily)    • Avoidance of smoking/smoking cessation counseling, if appropriate    • Limitation of alcohol intake to <1 drink, 2-3x per week   Radiation Dermatitis Gentle skin washing with water alone or mild, unscented, lanolin-free soap and water    Continue the rest of your personal  hygiene routine as normal.    [place] cream/ointment to affected area as needed   Maintaining a Healthy Weight Aim for a target BMI of 20-25 m2       • Dietary counseling   • Referral to dietician or weight management support group (e.g., Weight Watchers)               Surveillance When/How often Coordinating provider   Medical history/physical exam: Yrs 1-3 Every 6 months RAPHAEL BONILLA MD   Post-treatment mammography Every year RAPHAEL BONILLA MD   Pelvic examination Every year LINDSEY RODGERS MD   Breast self-examination Every month                Preventive care recommendations All preventive measures per PCP, Bone health, Colon cancer screening, Cholesterol monitoring/management, Diabetic screening/management, Diet, Exercise, Hypertension control, Mental health, Vaccines, Weight management   Comments on follow-up care Echocardiogram every 3 months    Continue all standard non-cancer related healthcare with your primary care provider. Any new, unusual, and/or persistent symptoms should be brought to the attention of your provider.    3/31/2017 12:30pm Herceptin   4/12/2017 9:45am Dr Osborn  4/21/2017 2:30pm Labs/Dr Bonilla/Anjelica  6/20/2017 [employer] with Miracle Gill DNP, APRN, AGCNS-BC, OCN  Oncology Survivorship Clinical Nurse Specialist  [facility] Program

## 2017-03-31 ENCOUNTER — INFUSION (OUTPATIENT)
Dept: ONCOLOGY | Facility: HOSPITAL | Age: 67
End: 2017-03-31

## 2017-03-31 VITALS
WEIGHT: 151.8 LBS | BODY MASS INDEX: 25.26 KG/M2 | DIASTOLIC BLOOD PRESSURE: 72 MMHG | TEMPERATURE: 99 F | HEART RATE: 52 BPM | SYSTOLIC BLOOD PRESSURE: 145 MMHG

## 2017-03-31 DIAGNOSIS — C50.212 MALIGNANT NEOPLASM OF UPPER-INNER QUADRANT OF LEFT FEMALE BREAST (HCC): ICD-10-CM

## 2017-03-31 DIAGNOSIS — C50.212 MALIGNANT NEOPLASM OF UPPER-INNER QUADRANT OF LEFT FEMALE BREAST (HCC): Primary | ICD-10-CM

## 2017-03-31 LAB
BASOPHILS # BLD AUTO: 0.01 10*3/MM3 (ref 0–0.1)
BASOPHILS NFR BLD AUTO: 0.3 % (ref 0–1.1)
DEPRECATED RDW RBC AUTO: 44.9 FL (ref 37–49)
EOSINOPHIL # BLD AUTO: 0.07 10*3/MM3 (ref 0–0.36)
EOSINOPHIL NFR BLD AUTO: 2.2 % (ref 1–5)
ERYTHROCYTE [DISTWIDTH] IN BLOOD BY AUTOMATED COUNT: 11.9 % (ref 11.7–14.5)
HCT VFR BLD AUTO: 32.3 % (ref 34–45)
HGB BLD-MCNC: 11.2 G/DL (ref 11.5–14.9)
IMM GRANULOCYTES # BLD: 0.01 10*3/MM3 (ref 0–0.03)
IMM GRANULOCYTES NFR BLD: 0.3 % (ref 0–0.5)
LYMPHOCYTES # BLD AUTO: 1.09 10*3/MM3 (ref 1–3.5)
LYMPHOCYTES NFR BLD AUTO: 34 % (ref 20–49)
MCH RBC QN AUTO: 35.7 PG (ref 27–33)
MCHC RBC AUTO-ENTMCNC: 34.7 G/DL (ref 32–35)
MCV RBC AUTO: 102.9 FL (ref 83–97)
MONOCYTES # BLD AUTO: 0.36 10*3/MM3 (ref 0.25–0.8)
MONOCYTES NFR BLD AUTO: 11.2 % (ref 4–12)
NEUTROPHILS # BLD AUTO: 1.67 10*3/MM3 (ref 1.5–7)
NEUTROPHILS NFR BLD AUTO: 52 % (ref 39–75)
NRBC BLD MANUAL-RTO: 0 /100 WBC (ref 0–0)
PLATELET # BLD AUTO: 212 10*3/MM3 (ref 150–375)
PMV BLD AUTO: 8.7 FL (ref 8.9–12.1)
RBC # BLD AUTO: 3.14 10*6/MM3 (ref 3.9–5)
WBC NRBC COR # BLD: 3.21 10*3/MM3 (ref 4–10)

## 2017-03-31 PROCEDURE — 25010000002 TRASTUZUMAB PER 10 MG: Performed by: INTERNAL MEDICINE

## 2017-03-31 PROCEDURE — 36415 COLL VENOUS BLD VENIPUNCTURE: CPT | Performed by: INTERNAL MEDICINE

## 2017-03-31 PROCEDURE — 85025 COMPLETE CBC W/AUTO DIFF WBC: CPT | Performed by: INTERNAL MEDICINE

## 2017-03-31 PROCEDURE — 96413 CHEMO IV INFUSION 1 HR: CPT | Performed by: INTERNAL MEDICINE

## 2017-03-31 RX ORDER — SODIUM CHLORIDE 9 MG/ML
250 INJECTION, SOLUTION INTRAVENOUS ONCE
Status: COMPLETED | OUTPATIENT
Start: 2017-03-31 | End: 2017-03-31

## 2017-03-31 RX ADMIN — SODIUM CHLORIDE 250 ML: 900 INJECTION, SOLUTION INTRAVENOUS at 13:21

## 2017-03-31 RX ADMIN — Medication 410 MG: at 13:19

## 2017-04-12 ENCOUNTER — OFFICE VISIT (OUTPATIENT)
Dept: RADIATION ONCOLOGY | Facility: HOSPITAL | Age: 67
End: 2017-04-12

## 2017-04-12 VITALS
TEMPERATURE: 98.5 F | HEART RATE: 64 BPM | WEIGHT: 152 LBS | RESPIRATION RATE: 16 BRPM | OXYGEN SATURATION: 97 % | DIASTOLIC BLOOD PRESSURE: 64 MMHG | BODY MASS INDEX: 25.29 KG/M2 | SYSTOLIC BLOOD PRESSURE: 124 MMHG

## 2017-04-12 DIAGNOSIS — C50.212 MALIGNANT NEOPLASM OF UPPER-INNER QUADRANT OF LEFT FEMALE BREAST (HCC): Primary | ICD-10-CM

## 2017-04-12 PROCEDURE — 99024 POSTOP FOLLOW-UP VISIT: CPT | Performed by: RADIOLOGY

## 2017-04-12 NOTE — PROGRESS NOTES
Subjective     No ref. provider found     Diagnosis Plan   1. Malignant neoplasm of upper-inner quadrant of left female breast       Chief Complaint   Patient presents with   • Follow-up                                   S:    I had the pleasure of seeing Yuli Huang  [facility].  She returns today for follow up now 4 weeks out from completion of her radiation therapy to the left chest wall and regional nodes for stage III left breast cancer.  She completed treatments on 3/7/17.  She received a dose of 5040cGy followed by a 10Gy scar boost.  She has been doing well since I last saw her. She states her skin is returning to normal and she denies any lymphedema of the left arm.         Chief Complaint   Patient presents with   • Follow-up   .        Review of Systems   Constitutional: Negative.    HENT: Negative.    Respiratory: Negative.    Musculoskeletal: Negative.    Skin: Negative.    Neurological: Negative.          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   • Depression    • History of cardiac cath 07/2014    10-20% LI    • History of migraine    • History of postmenopausal HRT     Post menopause hormones for 4 years   • Hx of radiation therapy    • Hyperlipidemia    • NSVT (nonsustained ventricular tachycardia)     RVOT tachycardia   • PONV (postoperative nausea and vomiting)    • Post-menopause    • Scarlet fever    • Urinary frequency    • VPC (ventricular premature complex)          Past Surgical History:   Procedure Laterality Date   • BACK SURGERY      Discectomy   • BREAST BIOPSY Left 2005   • BREAST BIOPSY      Stereotactic biopsey of right breast   • BREAST LUMPECTOMY     • BREAST LUMPECTOMY Right    • BREAST LUMPECTOMY WITH SENTINEL NODE BIOPSY AND AXILLARY NODE DISSECTION Left 10/5/2016    Procedure: LT BREAST LUMPECTOMY WITH MAMMO NEEDLE LOC W/ SENTINEL NODE BIOPSY AND POSS AXILLARY  NODE DISSECTION;  Surgeon: Denis Cortes MD;  Location: [facility] OR;  Service:    • BREAST SURGERY  2013    Lumpectomy   •  SECTION     • EYE SURGERY     • LUMBAR FUSION     • MASTECTOMY Left 2016    Procedure: LEFT BREAST MASTECTOMY WITH LEFT AXILLARY NODE DISSECTION;  Surgeon: Denis Cortes MD;  Location: [facility] OR;  Service:          Social History     Social History   • Marital status:      Spouse name: N/A   • Number of children: 1   • Years of education: 13     Occupational History   • retired  [employer]     Social History Main Topics   • Smoking status: Former Smoker     Packs/day: 2.00     Start date:      Quit date:    • Smokeless tobacco: Never Used      Comment: Quit for 10 years.  2 packs / day for 30 years (60 pack years).   • Alcohol use No      Comment: caffeine use   • Drug use: No   • Sexual activity: Defer     Other Topics Concern   • None     Social History Narrative         Family History   Problem Relation Age of Onset   • Heart disease Father    • Heart disease Other    • Diabetes Mother    • Breast cancer Mother 81   • Breast cancer Sister 68          Objective    Physical Exam   Constitutional: She appears well-developed and well-nourished.   HENT:   Head: Normocephalic and atraumatic.   Eyes: EOM are normal.   Neck: Neck supple.   Fullness in left supraclavicular fossa   Pulmonary/Chest:             Current Outpatient Prescriptions on File Prior to Visit   Medication Sig Dispense Refill   • atorvastatin (LIPITOR) 40 MG tablet Take 40 mg by mouth Every Evening.     • Calcium Carbonate (CALTRATE 600 PO) Take 1 tablet by mouth Every Morning.     • diclofenac (VOLTAREN) 75 MG EC tablet Take 75 mg by mouth.     • diphenoxylate-atropine (LOMOTIL) 2.5-0.025 MG per tablet Take 2 tablets by mouth 4 (Four) Times a Day As Needed for diarrhea.     • famciclovir (FAMVIR) 500 MG tablet Take 500 mg by mouth 3 (Three) Times a Day As Needed  (cold sore).     • folic acid (FOLVITE) 400 MCG tablet Take 400 mcg by mouth Every Morning.     • hyoscyamine (ANASPAZ,LEVSIN) 0.125 MG tablet Take 0.125 mg by mouth Every 4 (Four) Hours As Needed (overactive bladder).     • LUMIGAN 0.01 % ophthalmic drops      • meclizine (ANTIVERT) 25 MG tablet Take 25 mg by mouth 3 (Three) Times a Day As Needed.     • metoprolol succinate XL (TOPROL-XL) 25 MG 24 hr tablet Take 25 mg by mouth Every Evening. 1/2 tab each dose     • Multiple Vitamins-Minerals (MULTIVITAL PO) Take 1 tablet/day by mouth Every Morning.     • saccharomyces boulardii (FLORASTOR) 250 MG capsule Take 250 mg by mouth 2 (Two) Times a Day.     • silver sulfadiazine (SILVADENE) 1 % cream Apply  topically 2 (Two) Times a Day. 50 g 1     No current facility-administered medications on file prior to visit.        ALLERGIES:    Allergies   Allergen Reactions   • Nitrofurantoin Anaphylaxis   • Amoxicillin Diarrhea and Rash       /64  Pulse 64  Temp 98.5 °F (36.9 °C) (Oral)   Resp 16  Wt 152 lb (68.9 kg)  LMP  (LMP Unknown)  SpO2 97%  BMI 25.29 kg/m2     Current Status 3/10/2017   ECOG score 0         Assessment/Plan   66 year old female with remote hx of stage I right breast cancer and recent hx of stage III left breast cancer, now 4 weeks out from radiation.  She has recovered nicely from the radiation induced side effects.  She has some fullness in the left supraclavicular fossa that I am a little concerned about thus I will order a CT of the neck to evaluate.  It is possibly this is swelling from the radiation.  Her last mammogram in October 2016 showed some increased calcifications in the right breast thus i am going to order a right breast diagnostic mammogram for later this month.  She will follow up with me in 2 weeks to go over the results of these tests.  She continues to receive herceptin with Dr. Bonilla and has routine follow up with him as well.       Thank you very much for allowing me to  participate in the care of this very pleasant patient.    Sincerely,      Erika Osborn MD

## 2017-04-17 ENCOUNTER — HOSPITAL ENCOUNTER (OUTPATIENT)
Dept: CT IMAGING | Facility: HOSPITAL | Age: 67
Discharge: HOME | End: 2017-04-17
Attending: RADIOLOGY

## 2017-04-17 ENCOUNTER — HOSPITAL ENCOUNTER (OUTPATIENT)
Dept: MAMMOGRAPHY | Facility: HOSPITAL | Age: 67
Discharge: HOME | End: 2017-04-17
Attending: RADIOLOGY | Admitting: RADIOLOGY

## 2017-04-17 ENCOUNTER — HOSPITAL ENCOUNTER (OUTPATIENT)
Dept: ULTRASOUND IMAGING | Facility: HOSPITAL | Age: 67
Discharge: HOME | End: 2017-04-17

## 2017-04-17 DIAGNOSIS — C50.212 MALIGNANT NEOPLASM OF UPPER-INNER QUADRANT OF LEFT FEMALE BREAST (HCC): ICD-10-CM

## 2017-04-17 PROBLEM — Z45.2 FITTING AND ADJUSTMENT OF VASCULAR CATHETER: Status: ACTIVE | Noted: 2017-04-17

## 2017-04-17 LAB — CREAT BLDA-MCNC: 0.9 MG/DL (ref 0.6–1.3)

## 2017-04-17 PROCEDURE — G0279 TOMOSYNTHESIS, MAMMO: HCPCS

## 2017-04-17 PROCEDURE — G0206 DX MAMMO INCL CAD UNI: HCPCS

## 2017-04-17 PROCEDURE — 0 IOPAMIDOL 61 % SOLUTION: Performed by: RADIOLOGY

## 2017-04-17 PROCEDURE — 82565 ASSAY OF CREATININE: CPT

## 2017-04-17 PROCEDURE — 70491 CT SOFT TISSUE NECK W/DYE: CPT

## 2017-04-17 PROCEDURE — 76642 ULTRASOUND BREAST LIMITED: CPT

## 2017-04-17 RX ORDER — SODIUM CHLORIDE 0.9 % (FLUSH) 0.9 %
10 SYRINGE (ML) INJECTION AS NEEDED
Status: CANCELLED | OUTPATIENT
Start: 2017-04-21

## 2017-04-17 RX ADMIN — IOPAMIDOL 75 ML: 612 INJECTION, SOLUTION INTRAVENOUS at 16:15

## 2017-04-21 ENCOUNTER — APPOINTMENT (OUTPATIENT)
Dept: ONCOLOGY | Facility: HOSPITAL | Age: 67
End: 2017-04-21

## 2017-04-21 ENCOUNTER — INFUSION (OUTPATIENT)
Dept: ONCOLOGY | Facility: HOSPITAL | Age: 67
End: 2017-04-21

## 2017-04-21 ENCOUNTER — APPOINTMENT (OUTPATIENT)
Dept: ONCOLOGY | Facility: CLINIC | Age: 67
End: 2017-04-21

## 2017-04-21 ENCOUNTER — OFFICE VISIT (OUTPATIENT)
Dept: ONCOLOGY | Facility: CLINIC | Age: 67
End: 2017-04-21

## 2017-04-21 VITALS
RESPIRATION RATE: 16 BRPM | HEART RATE: 68 BPM | HEIGHT: 65 IN | BODY MASS INDEX: 25.92 KG/M2 | TEMPERATURE: 98.6 F | SYSTOLIC BLOOD PRESSURE: 132 MMHG | WEIGHT: 155.6 LBS | DIASTOLIC BLOOD PRESSURE: 70 MMHG

## 2017-04-21 DIAGNOSIS — C50.212 MALIGNANT NEOPLASM OF UPPER-INNER QUADRANT OF LEFT FEMALE BREAST (HCC): Primary | ICD-10-CM

## 2017-04-21 DIAGNOSIS — C50.212 MALIGNANT NEOPLASM OF UPPER-INNER QUADRANT OF LEFT FEMALE BREAST (HCC): ICD-10-CM

## 2017-04-21 LAB
BASOPHILS # BLD AUTO: 0.02 10*3/MM3 (ref 0–0.1)
BASOPHILS NFR BLD AUTO: 0.6 % (ref 0–1.1)
DEPRECATED RDW RBC AUTO: 47.2 FL (ref 37–49)
EOSINOPHIL # BLD AUTO: 0.04 10*3/MM3 (ref 0–0.36)
EOSINOPHIL NFR BLD AUTO: 1.1 % (ref 1–5)
ERYTHROCYTE [DISTWIDTH] IN BLOOD BY AUTOMATED COUNT: 12.3 % (ref 11.7–14.5)
HCT VFR BLD AUTO: 33.2 % (ref 34–45)
HGB BLD-MCNC: 11.2 G/DL (ref 11.5–14.9)
HOLD SPECIMEN: NORMAL
IMM GRANULOCYTES # BLD: 0.02 10*3/MM3 (ref 0–0.03)
IMM GRANULOCYTES NFR BLD: 0.6 % (ref 0–0.5)
LYMPHOCYTES # BLD AUTO: 0.88 10*3/MM3 (ref 1–3.5)
LYMPHOCYTES NFR BLD AUTO: 25.2 % (ref 20–49)
MCH RBC QN AUTO: 35.6 PG (ref 27–33)
MCHC RBC AUTO-ENTMCNC: 33.7 G/DL (ref 32–35)
MCV RBC AUTO: 105.4 FL (ref 83–97)
MONOCYTES # BLD AUTO: 0.45 10*3/MM3 (ref 0.25–0.8)
MONOCYTES NFR BLD AUTO: 12.9 % (ref 4–12)
NEUTROPHILS # BLD AUTO: 2.08 10*3/MM3 (ref 1.5–7)
NEUTROPHILS NFR BLD AUTO: 59.6 % (ref 39–75)
NRBC BLD MANUAL-RTO: 0 /100 WBC (ref 0–0)
PLATELET # BLD AUTO: 170 10*3/MM3 (ref 150–375)
PMV BLD AUTO: 8.8 FL (ref 8.9–12.1)
RBC # BLD AUTO: 3.15 10*6/MM3 (ref 3.9–5)
WBC NRBC COR # BLD: 3.49 10*3/MM3 (ref 4–10)

## 2017-04-21 PROCEDURE — 99213 OFFICE O/P EST LOW 20 MIN: CPT | Performed by: INTERNAL MEDICINE

## 2017-04-21 PROCEDURE — 85025 COMPLETE CBC W/AUTO DIFF WBC: CPT | Performed by: INTERNAL MEDICINE

## 2017-04-21 PROCEDURE — 25010000002 TRASTUZUMAB PER 10 MG: Performed by: INTERNAL MEDICINE

## 2017-04-21 PROCEDURE — 96413 CHEMO IV INFUSION 1 HR: CPT | Performed by: INTERNAL MEDICINE

## 2017-04-21 RX ORDER — SODIUM CHLORIDE 9 MG/ML
250 INJECTION, SOLUTION INTRAVENOUS ONCE
Status: CANCELLED | OUTPATIENT
Start: 2017-04-21

## 2017-04-21 RX ORDER — SODIUM CHLORIDE 9 MG/ML
250 INJECTION, SOLUTION INTRAVENOUS ONCE
Status: DISCONTINUED | OUTPATIENT
Start: 2017-04-21 | End: 2017-04-21 | Stop reason: HOSPADM

## 2017-04-21 RX ADMIN — Medication 410 MG: at 15:07

## 2017-04-21 NOTE — PROGRESS NOTES
[facility] OUTPATIENT CLIN[facility] FOLLOW UP VISIT    REASON FOR FOLLOW-UP:      Malignant neoplasm of upper-inner quadrant of left female breast    Staging form: Breast, AJCC V7      Clinical stage from 4/14/2016: Stage IIIC (T3, N3b, M0) - Signed by Jordon Bonilla MD on 4/28/2016    Malignant neoplasm of upper-outer quadrant of right female breast    Staging form: Breast, AJCC V7      Clinical stage from 3/25/2016: Stage IA (rT1a, N0, M0) - Signed by Erika Osborn MD on 3/25/2016    HISTORY OF PRESENT ILLNESS:  Yuli Huang is a 66 y.o. female who returns today for follow up of the above issue.      Her skin is doing well after completing radiation therapy.  She returns today anticipating her final cycle of Herceptin.  She is doing well.  She saw Dr. Osborn with radiation oncology.  She had a diagnostic right mammogram with ultrasound that looked okay.  She had a CT scan of her neck due to some fullness without any lymphadenopathy visualized.  She feels well.    ONCOLOGIC HISTORY:     Malignant neoplasm of upper-outer quadrant of right female breast    5/13/2013 Initial Diagnosis        5/13/2013 Biopsy    Biopsy:  Ductal carcinoma in situ.  ER 20%, AZ 1-4%.  Her2 not done.      6/4/2013 Surgery    Lumpectomy by Dr. Denis Cortes.  2mm invasive cancer associated with DCIS.  ER/AZ negative on the DCIS; unable to be performed on the invasive component.        6/18/2013 Surgery    [place] lymph node biopsy:  3 nodes negative.      7/10/2013 - 8/23/2013 Radiation    Radiation therapy to the right breast.        Malignant neoplasm of upper-inner quadrant of left female breast    3/31/2016 Imaging    Ultrasound left breast:  10 o'clock position, 3x2cm mass with a 2cm axillary lymph node.      4/7/2016 Biopsy    Ultrasound-guided biopsy of the left breast and axillary lymph node.  ER/AZ negative, HER-2 overexpressed.  Grade 2.        4/19/2016 Imaging    PET scan:  Left breast mass demonstrate  significant metabolic activity.  There is a single 1.7 cm lymph node at the left axilla which  demonstrates significant metabolic activity for its size. There is also  a subcentimeter node in the left internal mammary chain which  nonetheless demonstrates discernible activity. I suspect an early erasto  metastasis. No further evidence for metastatic disease is present.      4/21/2016 Imaging    Breast MRI:  1. Biopsy-proven malignancy in the left breast extending from the 8:30  o'clock through 1 o'clock positions and measuring up to 7.6 cm in  greatest dimension. Adjacent satellite clumped foci of enhancement are  also noted in the upper outer and lower outer quadrants. Left axillary  adenopathy is also appreciated.  2. There are no findings suspicious for malignancy in the right breast.  Postbreast conservation therapy changes of the right breast are noted.      4/29/2016 - 8/15/2016 Chemotherapy    Neoadjuvant TCHP      9/8/2016 Imaging    MRI breasts:  1. Findings consistent with significant interval response to neoadjuvant  chemotherapy in the left breast. However, there remains some scattered  stippled foci of enhancement that are asymmetric in the left breast  compared to the right breast and they are from the 8 o'clock through 12  o'clock positions in distribution. This corresponds to a region that was  previously occupied by considerable neoplastic disease/malignancy, thus  microscopic multifocal disease is suspected. There has been resolution  of left axillary adenopathy.  2. There are no findings suspicious for malignancy in the right breast.      10/5/2016 Surgery    Lumpectomy with sentinel lymph node biopsy:  Breast:  DCIS spanning 3.3cm.  ER/MT negative.  Multifocal.  No invasive carcinoma  1 of 2 sentinel nodes positive for carcinoma measuring 1.1mm without extracapsular extension.        11/16/2016 Surgery    Left modified radical mastectomy with axillary lymph node sampling by Dr. Cortes.  High grade  "DCIS with margins <1mm.  10 benign axillary lymph nodes.      1/9/2017 Imaging    PET scan:  IMPRESSION:  The low-level activity at the left axillary surgical bed may  represent residual postoperative change. Residual malignancy in this  region cannot be entirely excluded. There is otherwise no abnormal  hypermetabolic activity or convincing evidence for metastatic disease.      1/18/2017 - 3/7/2017 Radiation         - 4/21/2017 Chemotherapy    OP BREAST Trastuzumab Q21D (maintenance)              PAST MEDICAL, SURGICAL, FAMILY, AND SOCIAL HISTORIES WERE REVIEWED WITH THE PATIENT AND IN THE ELECTRONIC MEDICAL RECORD, AND WERE UPDATED IF INDICATED.    ALLERGIES:  Allergies   Allergen Reactions   • Nitrofurantoin Anaphylaxis   • Amoxicillin Diarrhea and Rash       MEDICATIONS:  The medication list has been reviewed with the patient by the medical assistant, and the list has been updated in the electronic medical record, which I reviewed.  Medication dosages and frequencies were confirmed to be accurate.    REVIEW OF SYSTEMS:  PAIN:  See Vital Signs below.  GENERAL:  No fevers, chills, night sweats, or unintended weight loss.  SKIN:  Hair is regrowing nicely on the scalp.  Skin changes from radiation nearly resolved.  HEME/LYMPH:  No abnormal bleeding.  No palpable lymphadenopathy.  EYES:  No vision changes or diplopia.  ENT:  No mucositis or ulcers  RESPIRATORY:  No cough, shortness of breath, hemoptysis, or wheezing.  CARDIOVASCULAR:  No chest pain, palpitations, orthopnea, or dyspnea on exertion.  GASTROINTESTINAL: Did not complain of any issues today  GENITOURINARY:  No dysuria or hematuria.  MUSCULOSKELETAL:  Improving range of motion of the left shoulder.    NEUROLOGIC:  Numbness in her fingertips and on the bottom of her feet  PSYCHIATRIC:  Anxiety improved.    Vitals:    04/21/17 1412   BP: 132/70   Pulse: 68   Resp: 16   Temp: 98.6 °F (37 °C)   Weight: 155 lb 9.6 oz (70.6 kg)   Height: 65.35\" (166 cm) "   PainSc: 3  Comment: from dental work       PHYSICAL EXAMINATION:  GENERAL:  Well-developed well-nourished female; awake, alert and oriented, in no acute distress.  SKIN: Significant erythema with areas of crusting on the left chest from radiation  HEAD:  Normocephalic, atraumatic. Alopecia   EYES:  Pupils equal, round and reactive to light.  Extraocular movements intact.  Conjunctivae normal.  EARS:  Hearing intact.  NOSE:  Septum midline.  No excoriations or nasal discharge.  MOUTH:  No stomatitis or ulcers.  Lips are normal.  THROAT:  Oropharynx without lesions or exudates.  NECK:  Supple with good range of motion; no thyromegaly or masses; no JVD or bruits.  LYMPHATICS:  No cervical, supraclavicular, axillary, or inguinal lymphadenopathy.  CHEST:  Lungs are clear to auscultation bilaterally.  No wheezes, rales, or rhonchi.  A Mediport is present and is benign in appearance in the right subclavian area.  HEART:  Regular rate; normal rhythm.  No murmurs, gallops or rubs.  ABDOMEN:  Soft, non-tender, non-distended.  Normal active bowel sounds.  No organomegaly  EXTREMITIES:  No clubbing, cyanosis, or edema.  NEUROLOGICAL:  No focal neurologic deficits.  BREASTS:  Not examined today.    DIAGNOSTIC DATA:  Lab Results   Component Value Date    WBC 3.49 (L) 04/21/2017    HGB 11.2 (L) 04/21/2017    HCT 33.2 (L) 04/21/2017    .4 (H) 04/21/2017     04/21/2017       Lab Results   Component Value Date    GLUCOSE 137 (H) 11/09/2016    BUN 17 11/09/2016    CREATININE 0.90 04/17/2017    EGFRIFNONA 86 11/09/2016    EGFRIFAFRI  09/16/2016      Comment:      <15 Indicative of kidney failure.    BCR 24.6 11/09/2016    CO2 26.9 11/09/2016    CALCIUM 8.8 11/09/2016    ALBUMIN 3.80 11/09/2016    LABIL2 1.7 11/09/2016    AST 22 11/09/2016    ALT 22 11/09/2016     Lab Results   Component Value Date    GLUCOSE 137 (H) 11/09/2016    CALCIUM 8.8 11/09/2016     11/09/2016    K 3.7 11/09/2016    CO2 26.9 11/09/2016      11/09/2016    BUN 17 11/09/2016    CREATININE 0.90 04/17/2017    EGFRIFAFRI  09/16/2016      Comment:      <15 Indicative of kidney failure.    EGFRIFNONA 86 11/09/2016    BCR 24.6 11/09/2016    ANIONGAP 13.1 11/09/2016     Imaging: None reviewed    ASSESSMENT:  This is a 66 y.o. female with:  1.  History of stage I carcinoma of the right breast.  The biopsy initially showed DCIS.  There was a tiny invasive component on the surgical specimen.  This was minimally hormone receptor positive and HER-2 was not able to be tested.  She had radiation following her surgery but no medical therapy.  2.  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  She completed 6 cycles of neoadjuvant chemotherapy with TCH P followed by a lumpectomy on 10/5/2016.  No residual invasive carcinoma in the breast but there was extensive DCIS with very close margins.  One lymph node was positive for metastatic carcinoma.  We discussed her case at the multidisciplinary breast conference.  She had a mammogram that showed some persistent suspicious calcifications and therefore on 11/16/2016 had a left modified radical mastectomy with axillary lymph node sampling.  High-grade DCIS was present but no invasive carcinoma and no lymph nodes were positive.  Margins were very close.  She had a PET scan that is negative.  She completed radiation therapy.  She completes Herceptin today.  3.  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  4.  Radiation dermatitis:  Essentially resolved.    PLAN:   1.  Her final dose of Herceptin will be today.  2.  She will see Dr. Cortes for Mediport removal  3.  Continue a vitamin B complex vitamin  4.  I will see her back in 3 months for follow-up with a CBC and a breast exam at that time.

## 2017-05-01 ENCOUNTER — OFFICE VISIT (OUTPATIENT)
Dept: RADIATION ONCOLOGY | Facility: HOSPITAL | Age: 67
End: 2017-05-01

## 2017-05-01 VITALS
TEMPERATURE: 98 F | DIASTOLIC BLOOD PRESSURE: 64 MMHG | HEART RATE: 68 BPM | RESPIRATION RATE: 16 BRPM | OXYGEN SATURATION: 100 % | SYSTOLIC BLOOD PRESSURE: 116 MMHG

## 2017-05-01 DIAGNOSIS — C50.212 MALIGNANT NEOPLASM OF UPPER-INNER QUADRANT OF LEFT FEMALE BREAST (HCC): Primary | ICD-10-CM

## 2017-05-01 PROCEDURE — 99024 POSTOP FOLLOW-UP VISIT: CPT | Performed by: RADIOLOGY

## 2017-05-22 ENCOUNTER — OFFICE VISIT (OUTPATIENT)
Dept: OBSTETRICS AND GYNECOLOGY | Age: 67
End: 2017-05-22

## 2017-05-22 VITALS
WEIGHT: 155 LBS | SYSTOLIC BLOOD PRESSURE: 134 MMHG | HEIGHT: 65 IN | DIASTOLIC BLOOD PRESSURE: 74 MMHG | BODY MASS INDEX: 25.83 KG/M2

## 2017-05-22 DIAGNOSIS — C50.411 MALIGNANT NEOPLASM OF UPPER-OUTER QUADRANT OF RIGHT FEMALE BREAST (HCC): ICD-10-CM

## 2017-05-22 DIAGNOSIS — Z11.51 SCREENING FOR HUMAN PAPILLOMAVIRUS: ICD-10-CM

## 2017-05-22 DIAGNOSIS — R09.89 BRUIT OF LEFT CAROTID ARTERY: ICD-10-CM

## 2017-05-22 DIAGNOSIS — Z01.419 WELL WOMAN EXAM WITH ROUTINE GYNECOLOGICAL EXAM: Primary | ICD-10-CM

## 2017-05-22 DIAGNOSIS — C50.212 MALIGNANT NEOPLASM OF UPPER-INNER QUADRANT OF LEFT FEMALE BREAST (HCC): ICD-10-CM

## 2017-05-22 DIAGNOSIS — Z78.0 MENOPAUSE: ICD-10-CM

## 2017-05-22 PROBLEM — E78.5 HYPERLIPIDEMIA: Status: ACTIVE | Noted: 2017-05-22

## 2017-05-22 PROBLEM — C50.919: Status: ACTIVE | Noted: 2017-05-22

## 2017-05-22 PROBLEM — K57.90 DIVERTICULOSIS OF INTESTINE: Status: ACTIVE | Noted: 2017-05-22

## 2017-05-22 PROBLEM — I49.9 VENTRICULAR ARRHYTHMIA: Status: ACTIVE | Noted: 2017-05-22

## 2017-05-22 PROBLEM — M19.90 ARTHRITIS: Status: ACTIVE | Noted: 2017-05-22

## 2017-05-22 PROBLEM — M85.80 OSTEOPENIA: Status: ACTIVE | Noted: 2017-05-22

## 2017-05-22 LAB
BILIRUB BLD-MCNC: NEGATIVE MG/DL
GLUCOSE UR STRIP-MCNC: NEGATIVE MG/DL
KETONES UR QL: NEGATIVE
LEUKOCYTE EST, POC: NEGATIVE
NITRITE UR-MCNC: NEGATIVE MG/ML
PH UR: 7 [PH] (ref 5–8)
PROT UR STRIP-MCNC: NEGATIVE MG/DL
RBC # UR STRIP: NEGATIVE /UL
SP GR UR: 1.03 (ref 1–1.03)
UROBILINOGEN UR QL: NORMAL

## 2017-05-22 PROCEDURE — 81003 URINALYSIS AUTO W/O SCOPE: CPT | Performed by: OBSTETRICS & GYNECOLOGY

## 2017-05-22 PROCEDURE — 99396 PREV VISIT EST AGE 40-64: CPT | Performed by: OBSTETRICS & GYNECOLOGY

## 2017-05-24 ENCOUNTER — TELEPHONE (OUTPATIENT)
Dept: OBSTETRICS AND GYNECOLOGY | Age: 67
End: 2017-05-24

## 2017-05-25 DIAGNOSIS — R09.89 LEFT CAROTID BRUIT: Primary | ICD-10-CM

## 2017-05-26 LAB
CYTOLOGIST CVX/VAG CYTO: NORMAL
CYTOLOGY CVX/VAG DOC THIN PREP: NORMAL
DX ICD CODE: NORMAL
HIV 1 & 2 AB SER-IMP: NORMAL
HPV I/H RISK 1 DNA CVX QL PROBE+SIG AMP: NORMAL
HPV I/H RISK 4 DNA CVX QL PROBE+SIG AMP: NEGATIVE
OTHER STN SPEC: NORMAL
PATH REPORT.FINAL DX SPEC: NORMAL
STAT OF ADQ CVX/VAG CYTO-IMP: NORMAL

## 2017-05-31 ENCOUNTER — HOSPITAL ENCOUNTER (OUTPATIENT)
Dept: CARDIOLOGY | Facility: HOSPITAL | Age: 67
Discharge: HOME | End: 2017-05-31
Attending: OBSTETRICS & GYNECOLOGY | Admitting: OBSTETRICS & GYNECOLOGY

## 2017-05-31 DIAGNOSIS — R09.89 LEFT CAROTID BRUIT: ICD-10-CM

## 2017-05-31 LAB
BH CV XLRA MEAS LEFT CCA RATIO VEL: -73.9 CM/SEC
BH CV XLRA MEAS LEFT DIST CCA EDV: -23.5 CM/SEC
BH CV XLRA MEAS LEFT DIST CCA PSV: -73.9 CM/SEC
BH CV XLRA MEAS LEFT DIST ICA EDV: -26.7 CM/SEC
BH CV XLRA MEAS LEFT DIST ICA PSV: -93.5 CM/SEC
BH CV XLRA MEAS LEFT ICA RATIO VEL: -62.7 CM/SEC
BH CV XLRA MEAS LEFT ICA/CCA RATIO: 0.85
BH CV XLRA MEAS LEFT MID ICA EDV: -39.3 CM/SEC
BH CV XLRA MEAS LEFT MID ICA PSV: -111 CM/SEC
BH CV XLRA MEAS LEFT PROX CCA EDV: 23.6 CM/SEC
BH CV XLRA MEAS LEFT PROX CCA PSV: 107 CM/SEC
BH CV XLRA MEAS LEFT PROX ECA EDV: -11.7 CM/SEC
BH CV XLRA MEAS LEFT PROX ECA PSV: -77.4 CM/SEC
BH CV XLRA MEAS LEFT PROX ICA EDV: -23.5 CM/SEC
BH CV XLRA MEAS LEFT PROX ICA PSV: -62.7 CM/SEC
BH CV XLRA MEAS LEFT PROX SCLA PSV: 137 CM/SEC
BH CV XLRA MEAS LEFT VERTEBRAL A EDV: -16.4 CM/SEC
BH CV XLRA MEAS LEFT VERTEBRAL A PSV: -50.4 CM/SEC
BH CV XLRA MEAS RIGHT CCA RATIO VEL: -72.7 CM/SEC
BH CV XLRA MEAS RIGHT DIST CCA EDV: -19.3 CM/SEC
BH CV XLRA MEAS RIGHT DIST CCA PSV: -72.7 CM/SEC
BH CV XLRA MEAS RIGHT DIST ICA EDV: -31.7 CM/SEC
BH CV XLRA MEAS RIGHT DIST ICA PSV: -86.8 CM/SEC
BH CV XLRA MEAS RIGHT ICA RATIO VEL: -72.7 CM/SEC
BH CV XLRA MEAS RIGHT ICA/CCA RATIO: 1
BH CV XLRA MEAS RIGHT MID ICA EDV: 29.3 CM/SEC
BH CV XLRA MEAS RIGHT MID ICA PSV: 95 CM/SEC
BH CV XLRA MEAS RIGHT PROX CCA EDV: 23.5 CM/SEC
BH CV XLRA MEAS RIGHT PROX CCA PSV: 106 CM/SEC
BH CV XLRA MEAS RIGHT PROX ECA EDV: -8.8 CM/SEC
BH CV XLRA MEAS RIGHT PROX ECA PSV: -70.9 CM/SEC
BH CV XLRA MEAS RIGHT PROX ICA EDV: -20.5 CM/SEC
BH CV XLRA MEAS RIGHT PROX ICA PSV: -72.7 CM/SEC
BH CV XLRA MEAS RIGHT PROX SCLA EDV: 18.7 CM/SEC
BH CV XLRA MEAS RIGHT PROX SCLA PSV: 112 CM/SEC
BH CV XLRA MEAS RIGHT VERTEBRAL A EDV: -11.1 CM/SEC
BH CV XLRA MEAS RIGHT VERTEBRAL A PSV: -55.1 CM/SEC
RIGHT ARM BP: NORMAL MMHG

## 2017-05-31 PROCEDURE — 93880 EXTRACRANIAL BILAT STUDY: CPT

## 2017-06-11 PROBLEM — R09.89 BRUIT OF LEFT CAROTID ARTERY: Status: ACTIVE | Noted: 2017-06-11

## 2017-06-11 PROBLEM — Z78.0 MENOPAUSE: Status: ACTIVE | Noted: 2017-06-11

## 2017-06-14 NOTE — PROGRESS NOTES
Notify Yuli that there is only mild stenosis in the mid-left internal carotid artery.  No treatment is needed for this but she should take some baby aspirin every day.

## 2017-06-20 ENCOUNTER — APPOINTMENT (OUTPATIENT)
Dept: PHYSICAL THERAPY | Facility: HOSPITAL | Age: 67
End: 2017-06-20

## 2017-07-13 ENCOUNTER — APPOINTMENT (OUTPATIENT)
Dept: ONCOLOGY | Facility: HOSPITAL | Age: 67
End: 2017-07-13

## 2017-07-13 ENCOUNTER — APPOINTMENT (OUTPATIENT)
Dept: ONCOLOGY | Facility: CLINIC | Age: 67
End: 2017-07-13

## 2017-07-17 ENCOUNTER — APPOINTMENT (OUTPATIENT)
Dept: PHYSICAL THERAPY | Facility: HOSPITAL | Age: 67
End: 2017-07-17

## 2017-07-31 ENCOUNTER — APPOINTMENT (OUTPATIENT)
Dept: PHYSICAL THERAPY | Facility: HOSPITAL | Age: 67
End: 2017-07-31

## 2017-08-01 ENCOUNTER — HOSPITAL ENCOUNTER (OUTPATIENT)
Dept: PHYSICAL THERAPY | Facility: HOSPITAL | Age: 67
Setting detail: THERAPIES SERIES
Discharge: HOME | End: 2017-08-01

## 2017-08-01 DIAGNOSIS — Z98.890 S/P LUMPECTOMY, LEFT BREAST: ICD-10-CM

## 2017-08-01 DIAGNOSIS — M25.612 STIFF SHOULDER, LEFT: ICD-10-CM

## 2017-08-01 DIAGNOSIS — M25.512 ACUTE PAIN OF LEFT SHOULDER: ICD-10-CM

## 2017-08-01 DIAGNOSIS — R73.02 IMPAIRED GLUCOSE TOLERANCE: ICD-10-CM

## 2017-08-01 DIAGNOSIS — Z48.89 AFTERCARE FOLLOWING SURGERY: ICD-10-CM

## 2017-08-01 DIAGNOSIS — R29.3 ABNORMAL POSTURE: ICD-10-CM

## 2017-08-01 DIAGNOSIS — C50.212 MALIGNANT NEOPLASM OF UPPER-INNER QUADRANT OF LEFT FEMALE BREAST (HCC): Primary | ICD-10-CM

## 2017-08-01 DIAGNOSIS — C50.411 MALIGNANT NEOPLASM OF UPPER-OUTER QUADRANT OF RIGHT FEMALE BREAST (HCC): ICD-10-CM

## 2017-08-01 DIAGNOSIS — Z90.12 S/P LEFT MASTECTOMY: ICD-10-CM

## 2017-08-01 PROCEDURE — 97164 PT RE-EVAL EST PLAN CARE: CPT | Performed by: PHYSICAL THERAPIST

## 2017-08-01 PROCEDURE — G8984 CARRY CURRENT STATUS: HCPCS | Performed by: PHYSICAL THERAPIST

## 2017-08-01 PROCEDURE — 93702 BIS XTRACELL FLUID ANALYSIS: CPT | Performed by: PHYSICAL THERAPIST

## 2017-08-01 PROCEDURE — 97110 THERAPEUTIC EXERCISES: CPT | Performed by: PHYSICAL THERAPIST

## 2017-08-01 PROCEDURE — G8985 CARRY GOAL STATUS: HCPCS | Performed by: PHYSICAL THERAPIST

## 2017-08-01 NOTE — THERAPY RE-EVALUATION
Physical Therapy Lymphedema Re-Evaluation  [facility]     Patient Name: Yuli Huang  : 1950  MRN: [id number]  Today's Date: 2017      Visit Date: 2017    Visit Dx:    ICD-10-CM ICD-9-CM   1. Malignant neoplasm of upper-inner quadrant of left female breast C50.212 174.2   2. S/P lumpectomy, left breast Z90.12 V45.89   3. Malignant neoplasm of upper-outer quadrant of right female breast C50.411 174.4   4. Aftercare following surgery Z48.89 V58.89   5. Acute pain of left shoulder M25.512 719.41   6. Stiff shoulder, left M25.612 719.51   7. S/P left mastectomy Z90.12 V45.71   8. Abnormal posture R29.3 781.92   9. Impaired glucose tolerance R73.02 790.22       Patient Active Problem List   Diagnosis   • Malignant neoplasm of upper-outer quadrant of right female breast   • Malignant neoplasm of upper-inner quadrant of left female breast   • Chemotherapy induced diarrhea   • Mucositis due to chemotherapy   • Urinary frequency   • Peripheral neuropathy due to chemotherapy   • NSVT (nonsustained ventricular tachycardia)   • VPC (ventricular premature complex)   • Breast cancer, left   • Fitting and adjustment of vascular catheter   • Arthritis   • Malignant neoplasm of breast   • Diverticulosis of intestine   • Impaired glucose tolerance   • Hyperlipidemia   • Osteopenia   • Infiltrating ductal carcinoma of breast, stage 1   • Ventricular arrhythmia   • Bruit of left carotid artery   • Menopause        Past Medical History:   Diagnosis Date   • Arrhythmia    • Arthritis    • Atypical squamous cell changes of undetermined significance (ASCUS) on cervical cytology with negative high risk human papilloma virus (HPV) test result    • Breast cancer     Right Breast Stage IA, invasive mammary cancer associated with DCIS   • Breast cancer 2016    Left, stage IIIC   • Concussion    • Depression    • Fever blister    • Glaucoma    • History of cardiac cath 2014    10-20% LI    • History of  "migraine    • History of postmenopausal HRT     Post menopause hormones for 4 years   • History of radiation therapy 07/10/2013    07/10/2013 - 2013  Right breast    • Hx of radiation therapy     finished radiation 2017 - 2017   (left)   • Hyperlipidemia    • Hypertriglyceridemia    • Incontinence of urine    • LGSIL of cervix of undetermined significance    • NSVT (nonsustained ventricular tachycardia)     RVOT tachycardia   • PONV (postoperative nausea and vomiting)    • Post-menopause    • Scarlet fever    • Status post chemotherapy 2017 - 2016 -  4 drugs -Herceptin, perjeta, carboplatin, taxotere.  Then continued every three weeks with Herceptin until 2017.    • Urinary frequency    • Vertigo    • VPC (ventricular premature complex)         Past Surgical History:   Procedure Laterality Date   • BACK SURGERY      Discectomy   • BREAST BIOPSY      Stereotactic biopsey of right breast   • BREAST LUMPECTOMY Right 2013   • BREAST LUMPECTOMY WITH SENTINEL NODE BIOPSY AND AXILLARY NODE DISSECTION Left 10/5/2016    Procedure: LT BREAST LUMPECTOMY WITH MAMMO NEEDLE LOC W/ SENTINEL NODE BIOPSY AND POSS AXILLARY NODE DISSECTION;  Surgeon: Denis Cortes MD;  Location: [facility];  Service:    • BREAST SURGERY  2013    Lumpectomy   •  SECTION      baby boy \"RAPHAEL\"   • CLOSED REDUCTION TIBIAL SHAFT FRACTURE     • COLONOSCOPY     • EYE SURGERY     • LUMBAR FUSION     • MASTECTOMY Left 2016    Procedure: LEFT BREAST MASTECTOMY WITH LEFT AXILLARY NODE DISSECTION;  Surgeon: Denis Cortes MD;  Location: [facility];  Service:    • OTHER SURGICAL HISTORY      cardiac cath   • POSTPARTUM TUBAL LIGATION     • SENTINEL LYMPH NODE BIOPSY         Visit Dx:    ICD-10-CM ICD-9-CM   1. Malignant neoplasm of upper-inner quadrant of left female breast C50.212 174.2   2. S/P lumpectomy, left breast Z90.12 V45.89   3. Malignant " neoplasm of upper-outer quadrant of right female breast C50.411 174.4   4. Aftercare following surgery Z48.89 V58.89   5. Acute pain of left shoulder M25.512 719.41   6. Stiff shoulder, left M25.612 719.51   7. S/P left mastectomy Z90.12 V45.71   8. Abnormal posture R29.3 781.92   9. Impaired glucose tolerance R73.02 790.22                 Lymphedema       08/01/17 1430          Subjective Comments    Subjective Comments I have been doing well with my arm. No issues at all. My skin is still a little red but no pain. I go back to [facility] for a 6 month f/u. I go back to [place], oncology this Friday. I did get my port removed. I am all finished with chemo and radiaiton now. I am still apart of my support group. I am going on a trip to [facility] in Sept. I will wear my sleeve but I haven't needed it for anything else since last time I was here. The silicone band does seem to help.   -SC      Subjective Pain    Able to rate subjective pain? yes  -SC      Pre-Treatment Pain Level 0  -SC      Post-Treatment Pain Level 0  -SC      Lymphedema Edema Assessment    Ptting Edema Category By grade out of 4  -SC      Pitting Edema + 1/4 (+1 of 4)   negative  -SC      Bioimpedence 5.7, WNL, back to baseline  -SC      Edema Assessment Comment negative  -SC      Lymphedema Measurements    Measurement Type(s) Circumferential  -SC      Circumferential Areas Upper extremities  -SC      LUE Circumferential (cm)    Measurement Location 1 axilla  -SC      Left 1 27 cm  -SC      Measurement Location 2 15 cm above elbow  -SC      Left 2 26.5 cm  -SC      Measurement Location 3 10 cm above elbow  -SC      Left 3 27 cm  -SC      Measurement Location 4 5 cm above elbow  -SC      Left 4 24.3 cm  -SC      Measurement Location 5 elbow  -SC      Left 5 22 cm  -SC      Measurement Location 6 5 cm below elbow  -SC      Left 6 21.5 cm  -SC      Measurement Location 7 10 cm below elbow  -SC      Left 7 20.4 cm  -SC      Measurement Location 8 20 cm below  elbow  -SC      Left 8 14.8 cm  -SC      Measurement Location 9 wrist  -SC      Left 9 14.1 cm  -SC      Measurement Location 10 midpalm  -SC      Left 10 15.6 cm  -SC      Measurement Location 11 MCPs  -SC      Left 11 16 cm  -SC      Measurement Location 12 total   -SC      Measurement Location 13 net difference R > L   -SC      Measurement Location 14 net decrease since initial evaluation  -SC      Compression/Skin Care    Compression Garment Comments instructed compression garment for flight/travel safety  -SC        User Key  (r) = Recorded By, (t) = Taken By, (c) = Cosigned By    Initials Name Provider Type    DOUGLAS Velasquez, KIARA Physical Therapist                                Therapy Education       08/01/17 1430          Therapy Education    Education Details bioimpedance test and results, travel safety  -SC      Given Symptoms/condition management  -SC      Program Reinforced  -SC      How Provided Verbal  -SC      Provided to Patient  -SC      Level of Understanding Verbalized  -SC        User Key  (r) = Recorded By, (t) = Taken By, (c) = Cosigned By    Initials Name Provider Type    DOUGLAS Velasquez PT Physical Therapist                  Exercises       08/01/17 1430          Subjective Comments    Subjective Comments I have been doing well with my arm. No issues at all. My skin is still a little red but no pain. I go back to [facility] for a 6 month f/u. I go back to [place], oncology this Friday. I did get my port removed. I am all finished with chemo and radiaiton now. I am still apart of my support group. I am going on a trip to [facility] in Sept. I will wear my sleeve but I haven't needed it for anything else since last time I was here. The silicone band does seem to help.   -SC      Subjective Pain    Able to rate subjective pain? yes  -SC      Pre-Treatment Pain Level 0  -SC      Post-Treatment Pain Level 0  -SC        User Key  (r) = Recorded By, (t) = Taken By, (c) = Cosigned By    Initials  Name Provider Type    DOUGLAS Velasquez, PT Physical Therapist                              PT OP Goals       08/01/17 1430       PT Short Term Goals    STG 1 Patient independent and compliant with initial home exercise program focused on diaphragmatic breathing, range of motion, flexibility to decrease edema and improve lymphatic flow for decreased edema and decreased risk of infection.   -SC     STG 1 Progress Met  -SC     STG 2 Patient demonstrate proper awareness of What is Lymphedema and 18 Steps of Prevention for improved prevention, management, care of symptoms and ease of transition to self-care of condition.   -SC     STG 2 Progress Met  -SC     STG 3 Patient independent and compliant with daytime OTS prevention compression garments as indicated for decreased risk of lymphedema and infection and safety with transition of self-care of condition.   -SC     STG 3 Progress Met  -SC     STG 4 Patient's PROM left shoulder flexion >/= 160 degrees for improved mobility, decreased axillary cording syndrome, decreased risk of edema, and improved posture, function for return to prior level of functioning  -SC     STG 4 Progress Met  -SC     Long Term Goals    LTG Date to Achieve 11/01/17  -SC     LTG 1 Patient independent and compliant with advanced Home Exercise Program for self-management of condition.   -SC     LTG 1 Progress Met  -SC     LTG 2 Patient demonstrate proper awareness of Care and Air Travel safety with compression/exercise as indicated for improved safety with travel and self-care of condition.   -SC     LTG 2 Progress Progressing  -SC     LTG 2 Progress Comments reviewed today for flight in Sept 2017  -SC     LTG 3 Patient demonstrate independence and compliance with proper skin care during/post radiation for improved mobility, decreased scar tissue, axillary cording and edema.  -SC     LTG 3 Progress Met  -SC     LTG 4 Patient improved AROM left shoulder flexion in seated/standing positions >/=  160 degrees for improved function in home and community for safety with return to prior level of functioning and transition to self-care of condition.   -SC     LTG 4 Progress Met  -SC     LT 5 Patient score </=25/100 on DASH for improved function and transition to self-management of condition.   -SC     LTG 5 Progress Progressing  [facility] Comments not formally assessed however has return to PLOF per patient.   -SC     Time Calculation    PT Goal Re-Cert Due Date 11/01/17  -SC       User Key  (r) = Recorded By, (t) = Taken By, (c) = Cosigned By    Initials Name Provider Type    SC Janet Velasquez, PT Physical Therapist                PT Assessment/Plan       08/01/17 1430       PT Assessment    Assessment Comments Mrs. Huang returns for 5 month bioimpedance assessment (unable to come at 3 month taylor, was rescheduled). Patient has completed radiation and Herceptin. She has port removed. She is still interested in reconstruction but has not met with Dr. De Leon at this time. Patient with negative pain, good range of motion and negative limitations in function as reported by patient. Her current L-dex score is 5.7, which is WNL and has returned to baseline score. Due to good progress, patient is to return in 3 months for f/u bioimpedance. If remains stable at baseline, to possibly decrease to every 6 months.   -SC     PT Plan    PT Frequency Other (comment)  -SC     Predicted Duration of Therapy Intervention (days/wks) 3 month bioimpedance assessment  -SC     PT Plan Comments return for 3 month bioimpedance unless otherwise indicated. DASH  -SC       User Key  (r) = Recorded By, (t) = Taken By, (c) = Cosigned By    Initials Name Provider Type    DOUGLAS Vleasquez, PT Physical Therapist                 Time Calculation:   Start Time: 1430  Stop Time: 1510  Time Calculation (min): 40 min     Therapy Charges for Today     Code Description Service Date Service Provider Modifiers Qty    55645546019   PT BIS XTRACELL FLUID ANALYSIS 8/1/2017 Janet Velasquez, PT  1    70056051206 HC PT RE-EVAL ESTABLISHED PLAN 2 8/1/2017 Janet Velasquez, PT GP 1    04073131886 HC PT CARRY MOV HAND OBJ CURRENT 8/1/2017 Janet Velasquez, PT GP, CI 1    58623435735 HC PT CARRY MOV HAND OBJ PROJECTED 8/1/2017 Janet Velasquez, PT GP, CI 1    50201962663 HC PT THER PROC EA 15 MIN 8/1/2017 Janet Velasquze, PT GP 1          PT G-Codes  PT Professional Judgement Used?: Yes  Functional Limitation: Carrying, moving and handling objects  Carrying, Moving and Handling Objects Current Status (): At least 1 percent but less than 20 percent impaired, limited or restricted  Carrying, Moving and Handling Objects Goal Status (): At least 1 percent but less than 20 percent impaired, limited or restricted         Janet Martinez, KIARA  8/1/2017

## 2017-08-04 ENCOUNTER — INFUSION (OUTPATIENT)
Dept: ONCOLOGY | Facility: HOSPITAL | Age: 67
End: 2017-08-04

## 2017-08-04 ENCOUNTER — OFFICE VISIT (OUTPATIENT)
Dept: ONCOLOGY | Facility: CLINIC | Age: 67
End: 2017-08-04

## 2017-08-04 VITALS
TEMPERATURE: 98.1 F | HEIGHT: 65 IN | RESPIRATION RATE: 14 BRPM | HEART RATE: 67 BPM | SYSTOLIC BLOOD PRESSURE: 120 MMHG | WEIGHT: 163 LBS | OXYGEN SATURATION: 98 % | DIASTOLIC BLOOD PRESSURE: 70 MMHG | BODY MASS INDEX: 27.16 KG/M2

## 2017-08-04 DIAGNOSIS — Z45.2 FITTING AND ADJUSTMENT OF VASCULAR CATHETER: Primary | ICD-10-CM

## 2017-08-04 DIAGNOSIS — C50.212 MALIGNANT NEOPLASM OF UPPER-INNER QUADRANT OF LEFT FEMALE BREAST (HCC): ICD-10-CM

## 2017-08-04 DIAGNOSIS — C50.212 MALIGNANT NEOPLASM OF UPPER-INNER QUADRANT OF LEFT FEMALE BREAST (HCC): Primary | ICD-10-CM

## 2017-08-04 LAB
BASOPHILS # BLD AUTO: 0.02 10*3/MM3 (ref 0–0.1)
BASOPHILS NFR BLD AUTO: 0.4 % (ref 0–1.1)
DEPRECATED RDW RBC AUTO: 43.2 FL (ref 37–49)
EOSINOPHIL # BLD AUTO: 0.06 10*3/MM3 (ref 0–0.36)
EOSINOPHIL NFR BLD AUTO: 1.3 % (ref 1–5)
ERYTHROCYTE [DISTWIDTH] IN BLOOD BY AUTOMATED COUNT: 11.6 % (ref 11.7–14.5)
HCT VFR BLD AUTO: 35.8 % (ref 34–45)
HGB BLD-MCNC: 12.7 G/DL (ref 11.5–14.9)
IMM GRANULOCYTES # BLD: 0.02 10*3/MM3 (ref 0–0.03)
IMM GRANULOCYTES NFR BLD: 0.4 % (ref 0–0.5)
LYMPHOCYTES # BLD AUTO: 1.4 10*3/MM3 (ref 1–3.5)
LYMPHOCYTES NFR BLD AUTO: 31.3 % (ref 20–49)
MCH RBC QN AUTO: 35.9 PG (ref 27–33)
MCHC RBC AUTO-ENTMCNC: 35.5 G/DL (ref 32–35)
MCV RBC AUTO: 101.1 FL (ref 83–97)
MONOCYTES # BLD AUTO: 0.53 10*3/MM3 (ref 0.25–0.8)
MONOCYTES NFR BLD AUTO: 11.8 % (ref 4–12)
NEUTROPHILS # BLD AUTO: 2.45 10*3/MM3 (ref 1.5–7)
NEUTROPHILS NFR BLD AUTO: 54.8 % (ref 39–75)
NRBC BLD MANUAL-RTO: 0 /100 WBC (ref 0–0)
PLATELET # BLD AUTO: 179 10*3/MM3 (ref 150–375)
PMV BLD AUTO: 9 FL (ref 8.9–12.1)
RBC # BLD AUTO: 3.54 10*6/MM3 (ref 3.9–5)
WBC NRBC COR # BLD: 4.48 10*3/MM3 (ref 4–10)

## 2017-08-04 PROCEDURE — 85025 COMPLETE CBC W/AUTO DIFF WBC: CPT | Performed by: INTERNAL MEDICINE

## 2017-08-04 PROCEDURE — 99213 OFFICE O/P EST LOW 20 MIN: CPT | Performed by: INTERNAL MEDICINE

## 2017-08-04 PROCEDURE — 96523 IRRIG DRUG DELIVERY DEVICE: CPT | Performed by: INTERNAL MEDICINE

## 2017-08-04 RX ORDER — SODIUM CHLORIDE 0.9 % (FLUSH) 0.9 %
10 SYRINGE (ML) INJECTION AS NEEDED
Status: DISCONTINUED | OUTPATIENT
Start: 2017-08-04 | End: 2017-08-09 | Stop reason: HOSPADM

## 2017-08-04 RX ORDER — HEPARIN SODIUM (PORCINE) LOCK FLUSH IV SOLN 100 UNIT/ML 100 UNIT/ML
500 SOLUTION INTRAVENOUS AS NEEDED
Status: CANCELLED | OUTPATIENT
Start: 2017-08-04

## 2017-08-04 RX ORDER — SODIUM CHLORIDE 0.9 % (FLUSH) 0.9 %
10 SYRINGE (ML) INJECTION AS NEEDED
Status: CANCELLED | OUTPATIENT
Start: 2017-08-04

## 2017-08-04 NOTE — PROGRESS NOTES
[facility] OUTPATIENT CLINIC FOLLOW UP VISIT    REASON FOR FOLLOW-UP:      Malignant neoplasm of upper-inner quadrant of left female breast    Staging form: Breast, AJCC V7      Clinical stage from 4/14/2016: Stage IIIC (T3, N3b, M0) - Signed by Jordon [facility] MD on 4/28/2016    Malignant neoplasm of upper-outer quadrant of right female breast    Staging form: Breast, AJCC V7      Clinical stage from 3/25/2016: Stage IA (rT1a, N0, M0) - Signed by Erika Osborn MD on 3/25/2016    HISTORY OF PRESENT ILLNESS:  Yuli Huang is a 67 y.o. female who returns today for follow up of the above issue.      She completed Herceptin on 4/21/2017.  She had her Mediport removed on 5/18/2017 by Dr. Cortes.  She continues to have some intermittent sternal discomfort which has been an ongoing issue for her.  This occurs less than once per week.  It is not worsening.  Very mild peripheral neuropathy persists but overall has improved.  Energy level is excellent.  She has noticed no new issues with the left chest wall or right breast.    ONCOLOGIC HISTORY:     Malignant neoplasm of upper-outer quadrant of right female breast    5/13/2013 Initial Diagnosis            5/13/2013 Biopsy     Biopsy:  Ductal carcinoma in situ.  ER 20%, ME 1-4%.  Her2 not done.         6/4/2013 Surgery     Lumpectomy by Dr. Denis Cortes.  2mm invasive cancer associated with DCIS.  ER/ME negative on the DCIS; unable to be performed on the invasive component.           6/18/2013 Surgery     [place] lymph node biopsy:  3 nodes negative.         7/10/2013 - 8/23/2013 Radiation     Radiation therapy to the right breast.           Malignant neoplasm of upper-inner quadrant of left female breast    3/31/2016 Imaging     Ultrasound left breast:  10 o'clock position, 3x2cm mass with a 2cm axillary lymph node.         4/7/2016 Biopsy     Ultrasound-guided biopsy of the left breast and axillary lymph node.  ER/ME negative, HER-2 overexpressed.   Grade 2.           4/19/2016 Imaging     PET scan:  Left breast mass demonstrate significant metabolic activity.  There is a single 1.7 cm lymph node at the left axilla which  demonstrates significant metabolic activity for its size. There is also  a subcentimeter node in the left internal mammary chain which  nonetheless demonstrates discernible activity. I suspect an early erasto  metastasis. No further evidence for metastatic disease is present.         4/21/2016 Imaging     Breast MRI:  1. Biopsy-proven malignancy in the left breast extending from the 8:30  o'clock through 1 o'clock positions and measuring up to 7.6 cm in  greatest dimension. Adjacent satellite clumped foci of enhancement are  also noted in the upper outer and lower outer quadrants. Left axillary  adenopathy is also appreciated.  2. There are no findings suspicious for malignancy in the right breast.  Postbreast conservation therapy changes of the right breast are noted.         4/29/2016 - 8/15/2016 Chemotherapy     Neoadjuvant TCHP         9/8/2016 Imaging     MRI breasts:  1. Findings consistent with significant interval response to neoadjuvant  chemotherapy in the left breast. However, there remains some scattered  stippled foci of enhancement that are asymmetric in the left breast  compared to the right breast and they are from the 8 o'clock through 12  o'clock positions in distribution. This corresponds to a region that was  previously occupied by considerable neoplastic disease/malignancy, thus  microscopic multifocal disease is suspected. There has been resolution  of left axillary adenopathy.  2. There are no findings suspicious for malignancy in the right breast.         10/5/2016 Surgery     Lumpectomy with sentinel lymph node biopsy:  Breast:  DCIS spanning 3.3cm.  ER/[place] negative.  Multifocal.  No invasive carcinoma  1 of 2 sentinel nodes positive for carcinoma measuring 1.1mm without extracapsular extension.           11/16/2016  Surgery     Left modified radical mastectomy with axillary lymph node sampling by Dr. Cortes.  High grade DCIS with margins <1mm.  10 benign axillary lymph nodes.         1/9/2017 Imaging     PET scan:  IMPRESSION:  The low-level activity at the left axillary surgical bed may  represent residual postoperative change. Residual malignancy in this  region cannot be entirely excluded. There is otherwise no abnormal  hypermetabolic activity or convincing evidence for metastatic disease.         1/18/2017 - 3/7/2017 Radiation             - 4/21/2017 Chemotherapy     OP BREAST Trastuzumab Q21D (maintenance)                 PAST MEDICAL, SURGICAL, FAMILY, AND SOCIAL HISTORIES WERE REVIEWED WITH THE PATIENT AND IN THE ELECTRONIC MEDICAL RECORD, AND WERE UPDATED IF INDICATED.    ALLERGIES:  Allergies   Allergen Reactions   • Nitrofurantoin Anaphylaxis   • Amoxicillin Diarrhea and Rash       MEDICATIONS:  The medication list has been reviewed with the patient by the medical assistant, and the list has been updated in the electronic medical record, which I reviewed.  Medication dosages and frequencies were confirmed to be accurate.    REVIEW OF SYSTEMS:  PAIN:  See Vital Signs below.  GENERAL:  No fevers, chills, night sweats, or unintended weight loss.  SKIN:  Hair is regrowing nicely on the scalp.  Skin changes from radiation nearly resolved.  HEME/LYMPH:  No abnormal bleeding.  No palpable lymphadenopathy.  EYES:  No vision changes or diplopia.  ENT:  No mucositis or ulcers  RESPIRATORY:  No cough, shortness of breath, hemoptysis, or wheezing.  CARDIOVASCULAR:  No chest pain, palpitations, orthopnea, or dyspnea on exertion.  GASTROINTESTINAL: Did not complain of any issues today  GENITOURINARY:  No dysuria or hematuria.  MUSCULOSKELETAL:  Improving range of motion of the left shoulder.    NEUROLOGIC:  Numbness in her fingertips and on the bottom of her feet Improving  PSYCHIATRIC:  Anxiety improved.    Vitals:    08/04/17  "1544   BP: 120/70   Pulse: 67   Resp: 14   Temp: 98.1 °F (36.7 °C)   TempSrc: Oral   SpO2: 98%   Weight: 163 lb (73.9 kg)   Height: 65.35\" (166 cm)   PainSc:   4   PainLoc: Sternum       PHYSICAL EXAMINATION:  GENERAL:  Well-developed well-nourished female; awake, alert and oriented, in no acute distress.  SKIN: Significant erythema with areas of crusting on the left chest from radiation  HEAD:  Normocephalic, atraumatic. Alopecia   EYES:  Pupils equal, round and reactive to light.  Extraocular movements intact.  Conjunctivae normal.  EARS:  Hearing intact.  NOSE:  Septum midline.  No excoriations or nasal discharge.  MOUTH:  No stomatitis or ulcers.  Lips are normal.  THROAT:  Oropharynx without lesions or exudates.  NECK:  Supple with good range of motion; no thyromegaly or masses; no JVD or bruits.  LYMPHATICS:  No cervical, supraclavicular, axillary, or inguinal lymphadenopathy.  CHEST:  Lungs are clear to auscultation bilaterally.  No wheezes, rales, or rhonchi.  A Mediport is present and is benign in appearance in the right subclavian area.  BREASTS: The left chest wall is examined today.  Healed surgical incision.  No skin changes or subcutaneous nodules.  The right breast shows a well-healed surgical incision at the 11 o'clock position and also at the 9 o'clock position near the axilla.  Minimal tenderness.  No nodules.  HEART:  Regular rate; normal rhythm.  No murmurs, gallops or rubs.  ABDOMEN:  Soft, non-tender, non-distended.  Normal active bowel sounds.  No organomegaly  EXTREMITIES:  No clubbing, cyanosis, or edema.  NEUROLOGICAL:  No focal neurologic deficits.  BREASTS:  Not examined today.    DIAGNOSTIC DATA:  Lab Results   Component Value Date    WBC 4.48 08/04/2017    HGB 12.7 08/04/2017    HCT 35.8 08/04/2017    .1 (H) 08/04/2017     08/04/2017       Lab Results   Component Value Date    GLUCOSE 137 (H) 11/09/2016    BUN 17 11/09/2016    CREATININE 0.90 04/17/2017    EGFRIFNONA 86 " 11/09/2016    EGFRIFAFRI  09/16/2016      Comment:      <15 Indicative of kidney failure.    BCR 24.6 11/09/2016    CO2 26.9 11/09/2016    CALCIUM 8.8 11/09/2016    ALBUMIN 3.80 11/09/2016    LABIL2 1.7 11/09/2016    AST 22 11/09/2016    ALT 22 11/09/2016     Lab Results   Component Value Date    GLUCOSE 137 (H) 11/09/2016    CALCIUM 8.8 11/09/2016     11/09/2016    K 3.7 11/09/2016    CO2 26.9 11/09/2016     11/09/2016    BUN 17 11/09/2016    CREATININE 0.90 04/17/2017    EGFRIFAFRI  09/16/2016      Comment:      <15 Indicative of kidney failure.    EGFRIFNONA 86 11/09/2016    BCR 24.6 11/09/2016    ANIONGAP 13.1 11/09/2016     Imaging: None reviewed    ASSESSMENT:  This is a 67 y.o. female with:  1.  History of stage I carcinoma of the right breast.  The biopsy initially showed DCIS.  There was a tiny invasive component on the surgical specimen.  This was minimally hormone receptor positive and HER-2 was not able to be tested.  She had radiation following her surgery but no medical therapy.  2.  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  She completed 6 cycles of neoadjuvant chemotherapy with TCH P followed by a lumpectomy on 10/5/2016.  No residual invasive carcinoma in the breast but there was extensive DCIS with very close margins.  One lymph node was positive for metastatic carcinoma.  We discussed her case at the multidisciplinary breast conference.  She had a mammogram that showed some persistent suspicious calcifications and therefore on 11/16/2016 had a left modified radical mastectomy with axillary lymph node sampling.  High-grade DCIS was present but no invasive carcinoma and no lymph nodes were positive.  Margins were very close.  She had a PET scan that was negative.  She completed radiation therapy.  She completed Herceptin on 4/21/2017.  3.  Grade 1 peripheral neuropathy related to chemotherapy: She  continues a vitamin B complex vitamin.  4.  Radiation dermatitis:  Resolved.  5.  Mild sternal discomfort: This has been chronic and stable.  If it worsens or changes character she will notify us and we can pursue a bone scan at that time.    PLAN:   1.  Continue a vitamin B complex vitamin  2.  She'll see Dr. Osborn with radiation oncology in December.  3.  I will therefore see her back in about 6 months for follow-up with a CBC  4.  A right-sided mammogram will be due in April 2018

## 2017-08-22 RX ORDER — METOPROLOL SUCCINATE 25 MG/1
TABLET, EXTENDED RELEASE ORAL
Qty: 15 TABLET | Refills: 2 | Status: SHIPPED | OUTPATIENT
Start: 2017-08-22 | End: 2017-10-27 | Stop reason: SDUPTHER

## 2017-10-27 RX ORDER — METOPROLOL SUCCINATE 25 MG/1
TABLET, EXTENDED RELEASE ORAL
Qty: 45 TABLET | Refills: 0 | Status: SHIPPED | OUTPATIENT
Start: 2017-10-27 | End: 2019-06-20

## 2017-11-01 ENCOUNTER — HOSPITAL ENCOUNTER (OUTPATIENT)
Dept: PHYSICAL THERAPY | Facility: HOSPITAL | Age: 67
Setting detail: THERAPIES SERIES
Discharge: HOME | End: 2017-11-01

## 2017-11-01 DIAGNOSIS — Z98.890 S/P LUMPECTOMY, LEFT BREAST: Primary | ICD-10-CM

## 2017-11-01 DIAGNOSIS — R29.3 ABNORMAL POSTURE: ICD-10-CM

## 2017-11-01 DIAGNOSIS — Z90.12 S/P LEFT MASTECTOMY: ICD-10-CM

## 2017-11-01 DIAGNOSIS — Z48.89 AFTERCARE FOLLOWING SURGERY: ICD-10-CM

## 2017-11-01 DIAGNOSIS — M25.512 ACUTE PAIN OF LEFT SHOULDER: ICD-10-CM

## 2017-11-01 DIAGNOSIS — M25.612 STIFF SHOULDER, LEFT: ICD-10-CM

## 2017-11-01 PROCEDURE — 93702 BIS XTRACELL FLUID ANALYSIS: CPT | Performed by: PHYSICAL THERAPIST

## 2017-11-01 PROCEDURE — 97110 THERAPEUTIC EXERCISES: CPT | Performed by: PHYSICAL THERAPIST

## 2017-11-01 PROCEDURE — G8985 CARRY GOAL STATUS: HCPCS | Performed by: PHYSICAL THERAPIST

## 2017-11-01 PROCEDURE — G8984 CARRY CURRENT STATUS: HCPCS | Performed by: PHYSICAL THERAPIST

## 2017-11-01 NOTE — THERAPY RE-EVALUATION
Physical Therapy Lymphedema Re-Evaluation  [facility]     Patient Name: Yuli Huang  : 1950  MRN: [id number]  Today's Date: 2017      Visit Date: 2017    Visit Dx:    ICD-10-CM ICD-9-CM   1. S/P lumpectomy, left breast Z98.890 V45.89   2. Aftercare following surgery Z48.89 V58.89   3. Acute pain of left shoulder M25.512 719.41   4. Stiff shoulder, left M25.612 719.51   5. S/P left mastectomy Z90.12 V45.71   6. Abnormal posture R29.3 781.92       Patient Active Problem List   Diagnosis   • Malignant neoplasm of upper-outer quadrant of right female breast   • Malignant neoplasm of upper-inner quadrant of left female breast   • Chemotherapy induced diarrhea   • Mucositis due to chemotherapy   • Urinary frequency   • Peripheral neuropathy due to chemotherapy   • NSVT (nonsustained ventricular tachycardia)   • VPC (ventricular premature complex)   • Breast cancer, left   • Fitting and adjustment of vascular catheter   • Arthritis   • Malignant neoplasm of breast   • Diverticulosis of intestine   • Impaired glucose tolerance   • Hyperlipidemia   • Osteopenia   • Infiltrating ductal carcinoma of breast, stage 1   • Ventricular arrhythmia   • Bruit of left carotid artery   • Menopause        Past Medical History:   Diagnosis Date   • Arrhythmia    • Arthritis    • Atypical squamous cell changes of undetermined significance (ASCUS) on cervical cytology with negative high risk human papilloma virus (HPV) test result    • Breast cancer     Right Breast Stage IA, invasive mammary cancer associated with DCIS   • Breast cancer 2016    Left, stage IIIC   • Concussion    • Depression    • Fever blister    • Glaucoma    • History of cardiac cath 2014    10-20% LI    • History of migraine    • History of postmenopausal HRT     Post menopause hormones for 4 years   • History of radiation therapy 07/10/2013    07/10/2013 - 2013  Right breast    • Hx of radiation therapy     finished  "radiation 2017 - 2017   (left)   • Hyperlipidemia    • Hypertriglyceridemia    • Incontinence of urine    • LGSIL of cervix of undetermined significance    • NSVT (nonsustained ventricular tachycardia)     RVOT tachycardia   • PONV (postoperative nausea and vomiting)    • Post-menopause    • Scarlet fever    • Status post chemotherapy 2017 - 2016 -  4 drugs -Herceptin, perjeta, carboplatin, taxotere.  Then continued every three weeks with Herceptin until 2017.    • Urinary frequency    • Vertigo    • VPC (ventricular premature complex)         Past Surgical History:   Procedure Laterality Date   • BACK SURGERY      Discectomy   • BREAST BIOPSY      Stereotactic biopsey of right breast   • BREAST LUMPECTOMY Right 2013   • BREAST LUMPECTOMY WITH SENTINEL NODE BIOPSY AND AXILLARY NODE DISSECTION Left 10/5/2016    Procedure: LT BREAST LUMPECTOMY WITH MAMMO NEEDLE LOC W/ SENTINEL NODE BIOPSY AND POSS AXILLARY NODE DISSECTION;  Surgeon: Denis Cortes MD;  Location: [facility];  Service:    • BREAST SURGERY  2013    Lumpectomy   •  SECTION      baby boy \"RAPHAEL\"   • CLOSED REDUCTION TIBIAL SHAFT FRACTURE     • COLONOSCOPY     • EYE SURGERY     • LUMBAR FUSION     • MASTECTOMY Left 2016    Procedure: LEFT BREAST MASTECTOMY WITH LEFT AXILLARY NODE DISSECTION;  Surgeon: Denis Cortes MD;  Location: [facility];  Service:    • OTHER SURGICAL HISTORY      cardiac cath   • POSTPARTUM TUBAL LIGATION     • SENTINEL LYMPH NODE BIOPSY         Visit Dx:    ICD-10-CM ICD-9-CM   1. S/P lumpectomy, left breast Z98.890 V45.89   2. Aftercare following surgery Z48.89 V58.89   3. Acute pain of left shoulder M25.512 719.41   4. Stiff shoulder, left M25.612 719.51   5. S/P left mastectomy Z90.12 V45.71   6. Abnormal posture R29.3 781.92                 Lymphedema       17 1315          Subjective Comments    Subjective Comments I am " doing great. Trip to [place] was fine. wore my sleeve. Nothing else coming up.   -SC      Pain Assessment    Pain Assessment No/denies pain  -SC      Lymphedema Edema Assessment    Bioimpedence 5.5, WNL stable at baseline  -SC        User Key  (r) = Recorded By, (t) = Taken By, (c) = Cosigned By    Initials Name Provider Type    DOUGLAS Velasquez, KIARA Physical Therapist                                Therapy Education       11/01/17 1315          Therapy Education    Education Details bioimpedance test and results  -SC      Program Reinforced  -SC      How Provided Verbal  -SC      Provided to Patient  -SC      Level of Understanding Verbalized  -SC        User Key  (r) = Recorded By, (t) = Taken By, (c) = Cosigned By    Initials Name Provider Type    DOUGLAS Velasquez PT Physical Therapist                  Exercises       11/01/17 1315          Subjective Comments    Subjective Comments I am doing great. Trip to [place] was fine. wore my sleeve. Nothing else coming up.   -SC        User Key  (r) = Recorded By, (t) = Taken By, (c) = Cosigned By    Initials Name Provider Type    DOUGLAS Velasquez, PT Physical Therapist                              PT OP Goals       11/01/17 1315       PT Short Term Goals    STG 1 Patient independent and compliant with initial home exercise program focused on diaphragmatic breathing, range of motion, flexibility to decrease edema and improve lymphatic flow for decreased edema and decreased risk of infection.   -SC     STG 1 Progress Met  -SC     STG 2 Patient demonstrate proper awareness of What is Lymphedema and 18 Steps of Prevention for improved prevention, management, care of symptoms and ease of transition to self-care of condition.   -SC     STG 2 Progress Met  -SC     STG 3 Patient independent and compliant with daytime OTS prevention compression garments as indicated for decreased risk of lymphedema and infection and safety with transition of self-care of condition.    -SC     STG 3 Progress Met  -SC     STG 4 Patient's PROM left shoulder flexion >/= 160 degrees for improved mobility, decreased axillary cording syndrome, decreased risk of edema, and improved posture, function for return to prior level of functioning  -SC     STG 4 Progress Met  -SC     Long Term Goals    LTG Date to Achieve 05/02/18  -SC     LTG 1 Patient independent and compliant with advanced Home Exercise Program for self-management of condition.   -SC     LTG 1 Progress Met  -SC     LTG 2 Patient demonstrate proper awareness of Care and Air Travel safety with compression/exercise as indicated for improved safety with travel and self-care of condition.   -SC     LTG 2 Progress Met  -SC     LTG 3 Patient demonstrate independence and compliance with proper skin care during/post radiation for improved mobility, decreased scar tissue, axillary cording and edema.  -SC     LTG 3 Progress Met  -SC     LTG 4 Patient improved AROM left shoulder flexion in seated/standing positions >/= 160 degrees for improved function in home and community for safety with return to prior level of functioning and transition to self-care of condition.   -SC     LTG 4 Progress Met  -SC     LTG 5 Patient score </=25/100 on DASH for improved function and transition to self-management of condition.   -SC     LTG 5 Progress Met  -SC     LTG 6 Patient's bioimpedance score to remain between 1-7 for stabilization and decreased risk of developing stage II non reversible chronic post mastectomy lymphedema syndrome left UE.  -SC     LTG 6 Progress New  -SC     LTG 6 Progress Comments current L-Dex 5.5, baseline 5.1 (Nov 2016)  -SC     Time Calculation    PT Goal Re-Cert Due Date 05/02/18  -SC       User Key  (r) = Recorded By, (t) = Taken By, (c) = Cosigned By    Initials Name Provider Type    SC Janet Velasquez, PT Physical Therapist                PT Assessment/Plan       11/01/17 1315       PT Assessment    Assessment Comments Mrs. Huang  returns today for 3 month bioimpedance assessment. Current L-Dex is 5.5 which is WNL and stable at baseline. Due to stability, patient will return in 6 months for f/u bioimpedance diagnostic assessment unless otherwise indicated.   -SC     PT Plan    PT Frequency Other (comment)  -SC     Predicted Duration of Therapy Intervention (days/wks) 6 month f/u bioimpedance assessment  -SC     PT Plan Comments 6 month bioimpedance testing unless otherwise indicated. DASH.   -SC       User Key  (r) = Recorded By, (t) = Taken By, (c) = Cosigned By    Initials Name Provider Type    SC Janet Velasquez, PT Physical Therapist                 Time Calculation:   Start Time: 1315  Stop Time: 1345  Time Calculation (min): 30 min     Therapy Charges for Today     Code Description Service Date Service Provider Modifiers Qty    06662084532 HC PT CARRY MOV HAND OBJ CURRENT 11/1/2017 Janet Velasquez, PT GP, CI 1    79195118438 HC PT CARRY MOV HAND OBJ PROJECTED 11/1/2017 Janet Velasquez, PT GP, CI 1    56407200714 HC PT THER PROC EA 15 MIN 11/1/2017 Janet Velasquez, PT GP 1    24698425667 HC PT BIS XTRACELL FLUID ANALYSIS 11/1/2017 Janet Velasquez, PT  1          PT G-Codes  PT Professional Judgement Used?: Yes  Functional Limitation: Carrying, moving and handling objects  Carrying, Moving and Handling Objects Current Status (): At least 1 percent but less than 20 percent impaired, limited or restricted  Carrying, Moving and Handling Objects Goal Status (): At least 1 percent but less than 20 percent impaired, limited or restricted         Janet Martinez, PT  11/1/2017

## 2017-12-13 ENCOUNTER — OFFICE VISIT (OUTPATIENT)
Dept: RADIATION ONCOLOGY | Facility: HOSPITAL | Age: 67
End: 2017-12-13

## 2017-12-13 VITALS
OXYGEN SATURATION: 99 % | HEART RATE: 63 BPM | SYSTOLIC BLOOD PRESSURE: 121 MMHG | TEMPERATURE: 97 F | DIASTOLIC BLOOD PRESSURE: 72 MMHG | RESPIRATION RATE: 16 BRPM

## 2017-12-13 DIAGNOSIS — C50.411 MALIGNANT NEOPLASM OF UPPER-OUTER QUADRANT OF RIGHT FEMALE BREAST, UNSPECIFIED ESTROGEN RECEPTOR STATUS (HCC): Primary | ICD-10-CM

## 2017-12-13 DIAGNOSIS — C50.212 MALIGNANT NEOPLASM OF UPPER-INNER QUADRANT OF LEFT FEMALE BREAST, UNSPECIFIED ESTROGEN RECEPTOR STATUS (HCC): ICD-10-CM

## 2017-12-13 PROCEDURE — 99213 OFFICE O/P EST LOW 20 MIN: CPT | Performed by: RADIOLOGY

## 2017-12-13 NOTE — PROGRESS NOTES
Subjective     No ref. provider found     Diagnosis Plan   1. Malignant neoplasm of upper-outer quadrant of right female breast, unspecified estrogen receptor status     2. Malignant neoplasm of upper-inner quadrant of left female breast, unspecified estrogen receptor status       CC: 8 month follow up for stage III left breast cancer                              S:  [facility] the [facility]. She returns today for follow up now 9 months out from completion of her radiation therapy to the left chest wall and regional nodes for stage III left breast cancer. She completed treatments on 3/7/17. She received a dose of 5040cGy followed by a 10Gy scar boost. She has been doing well since I last saw her. She states her skin is returning to normal and she denies any lymphedema of the left arm. She had a right breast mammogram and ultrasound on 4/17/17 which was benign and routine follow up was recommended.  She had a CT of the neck on 4/17/17 due to some fullness in the left supraclavicular fossa and this was negative for any suspicious findings as well.  She has been doing very well and saw Dr. Bonilla with CBC in August.      Review of Systems   Constitutional: Negative.    HENT: Negative.    Respiratory: Negative.    Cardiovascular: Negative.    Gastrointestinal: Negative.    Musculoskeletal: Negative.          Past Medical History:   Diagnosis Date   • Arrhythmia    • Arthritis    • Atypical squamous cell changes of undetermined significance (ASCUS) on cervical cytology with negative high risk human papilloma virus (HPV) test result    • Breast cancer 2013    Right Breast Stage IA, invasive mammary cancer associated with DCIS   • Breast cancer 04/07/2016    Left, stage IIIC   • Concussion    • Depression    • Fever blister    • Glaucoma    • History of cardiac cath 07/2014    10-20% LI    • History of migraine    • History of postmenopausal HRT     Post menopause hormones  "for 4 years   • History of radiation therapy 07/10/2013    07/10/2013 - 2013  Right breast    • Hx of radiation therapy     finished radiation 2017 - 2017   (left)   • Hyperlipidemia    • Hypertriglyceridemia    • Incontinence of urine    • LGSIL of cervix of undetermined significance    • NSVT (nonsustained ventricular tachycardia)     RVOT tachycardia   • PONV (postoperative nausea and vomiting)    • Post-menopause    • Scarlet fever    • Status post chemotherapy 2017 - 2016 -  4 drugs -Herceptin, perjeta, carboplatin, taxotere.  Then continued every three weeks with Herceptin until 2017.    • Urinary frequency    • Vertigo    • VPC (ventricular premature complex)          Past Surgical History:   Procedure Laterality Date   • BACK SURGERY      Discectomy   • BREAST BIOPSY      Stereotactic biopsey of right breast   • BREAST LUMPECTOMY Right 2013   • BREAST LUMPECTOMY WITH SENTINEL NODE BIOPSY AND AXILLARY NODE DISSECTION Left 10/5/2016    Procedure: LT BREAST LUMPECTOMY WITH MAMMO NEEDLE LOC W/ SENTINEL NODE BIOPSY AND POSS AXILLARY NODE DISSECTION;  Surgeon: Denis Cortes MD;  Location: [facility];  Service:    • BREAST SURGERY  2013    Lumpectomy   •  SECTION      baby boy \"RAPHAEL\"   • CLOSED REDUCTION TIBIAL SHAFT FRACTURE     • COLONOSCOPY     • EYE SURGERY     • LUMBAR FUSION     • MASTECTOMY Left 2016    Procedure: LEFT BREAST MASTECTOMY WITH LEFT AXILLARY NODE DISSECTION;  Surgeon: Denis Cortes MD;  Location: [facility];  Service:    • OTHER SURGICAL HISTORY      cardiac cath   • POSTPARTUM TUBAL LIGATION     • SENTINEL LYMPH NODE BIOPSY           Social History     Social History   • Marital status:      Spouse name: N/A   • Number of children: 1   • Years of education: 13     Occupational History   •  [employer]   •  Retired     Social History Main Topics   • Smoking " status: Former Smoker     Packs/day: 2.00     Start date: 1968     Quit date: 2002   • Smokeless tobacco: Never Used      Comment: Quit for 10 years.  2 packs / day for 30 years (60 pack years).   • Alcohol use No      Comment: caffeine use   • Drug use: No   • Sexual activity: Defer     Other Topics Concern   • Not on file     Social History Narrative         Family History   Problem Relation Age of Onset   • Heart disease Father    • Abnormal EKG Father    • Heart attack Father 55   • Breast cancer Mother 81   • Diabetes type II Mother    • Breast cancer Sister 68     BRCA NEGATIVE    • No Known Problems Maternal Grandmother    • No Known Problems Maternal Grandfather    • No Known Problems Paternal Grandmother    • No Known Problems Paternal Grandfather    • Heart attack Paternal Uncle 54   • Heart attack Paternal Uncle 54   • No Known Problems Sister    • Celiac disease Sister    • No Known Problems Son      DEJON           Objective    Physical Exam   Constitutional: She is oriented to person, place, and time. She appears well-developed and well-nourished.   HENT:   Head: Normocephalic and atraumatic.   Eyes: EOM are normal.   Neck: Neck supple.   Fullness left supraclav fossa/edema   Pulmonary/Chest:       Lymphadenopathy:     She has no cervical adenopathy.   Neurological: She is alert and oriented to person, place, and time.         Current Outpatient Prescriptions on File Prior to Visit   Medication Sig Dispense Refill   • atorvastatin (LIPITOR) 40 MG tablet Take 40 mg by mouth Every Evening.     • Calcium Carbonate (CALTRATE 600 PO) Take 1 tablet by mouth Every Morning.     • diclofenac (VOLTAREN) 75 MG EC tablet Take 75 mg by mouth.     • diphenoxylate-atropine (LOMOTIL) 2.5-0.025 MG per tablet Take 2 tablets by mouth 4 (Four) Times a Day As Needed for diarrhea.     • famciclovir (FAMVIR) 500 MG tablet Take 500 mg by mouth 3 (Three) Times a Day As Needed (cold sore).     • folic acid (FOLVITE) 400 MCG  tablet Take 400 mcg by mouth Every Morning.     • hyoscyamine (ANASPAZ,LEVSIN) 0.125 MG tablet Take 0.125 mg by mouth Every 4 (Four) Hours As Needed (overactive bladder).     • LUMIGAN 0.01 % ophthalmic drops      • meclizine (ANTIVERT) 25 MG tablet Take 25 mg by mouth 3 (Three) Times a Day As Needed.     • metoprolol succinate XL (TOPROL-XL) 25 MG 24 hr tablet Take 25 mg by mouth Every Evening. 1/2 tab each dose     • metoprolol succinate XL (TOPROL-XL) 25 MG 24 hr tablet TAKE ONE-HALF TABLET BY MOUTH DAILY 45 tablet 0   • Multiple Vitamins-Minerals (MULTIVITAL PO) Take 1 tablet/day by mouth Every Morning.     • saccharomyces boulardii (FLORASTOR) 250 MG capsule Take 250 mg by mouth 2 (Two) Times a Day.     • silver sulfadiazine (SILVADENE) 1 % cream Apply  topically 2 (Two) Times a Day. 50 g 1     No current facility-administered medications on file prior to visit.        ALLERGIES:    Allergies   Allergen Reactions   • Nitrofurantoin Anaphylaxis   • Amoxicillin Diarrhea and Rash       LMP  (LMP Unknown)     Current Status 8/4/2017   ECOG score 0         Assessment/Plan   67 year old female with remote hx of stage I right breast cancer and recent hx of stage III left breast cancer, now 9 months out from radiation to left chest wall and regional nodes. She has recovered nicely from the radiation induced side effects. She has some fullness in the left supraclavicular fossa and had a CT of the neck on 4/17/17 was negative.  This is likely some edema from recent surgery and radiation.  She had a right breast mammogram and ultrasound on 4/17/17 which showed no suspicious findings and routine follow up was recommended.  She will be due for her next mammogram in April 2018. She has a follow up with Dr. Bonilla again in February 2018 and will continue routine follow up with him as well.  I will see her back in six months or sooner if necessary.           Thank you very much for allowing me to participate in the care of this  very pleasant patient.    Sincerely,      Erika Osborn MD

## 2017-12-27 ENCOUNTER — OFFICE VISIT (OUTPATIENT)
Dept: CARDIOLOGY | Facility: CLINIC | Age: 67
End: 2017-12-27

## 2017-12-27 VITALS
HEIGHT: 65 IN | SYSTOLIC BLOOD PRESSURE: 122 MMHG | BODY MASS INDEX: 29.26 KG/M2 | HEART RATE: 77 BPM | DIASTOLIC BLOOD PRESSURE: 78 MMHG | WEIGHT: 175.6 LBS

## 2017-12-27 DIAGNOSIS — I25.10 NONOCCLUSIVE CORONARY ATHEROSCLEROSIS OF NATIVE CORONARY ARTERY: ICD-10-CM

## 2017-12-27 DIAGNOSIS — I49.3 VPC (VENTRICULAR PREMATURE COMPLEX): Primary | ICD-10-CM

## 2017-12-27 DIAGNOSIS — I47.29 NSVT (NONSUSTAINED VENTRICULAR TACHYCARDIA) (HCC): ICD-10-CM

## 2017-12-27 DIAGNOSIS — I65.22 CAROTID ATHEROSCLEROSIS, LEFT: ICD-10-CM

## 2017-12-27 PROCEDURE — 93000 ELECTROCARDIOGRAM COMPLETE: CPT | Performed by: INTERNAL MEDICINE

## 2017-12-27 PROCEDURE — 99213 OFFICE O/P EST LOW 20 MIN: CPT | Performed by: INTERNAL MEDICINE

## 2017-12-27 RX ORDER — METOPROLOL SUCCINATE 25 MG/1
TABLET, EXTENDED RELEASE ORAL
Qty: 45 TABLET | Refills: 3 | Status: SHIPPED | OUTPATIENT
Start: 2017-12-27 | End: 2018-05-24 | Stop reason: SDUPTHER

## 2017-12-27 NOTE — PROGRESS NOTES
Date of Office Visit: 2017  Encounter Provider: Phong Sunshine MD  Place of Service: [facility] CARDIOLOGY  Patient Name: Yuli Huang  :1950    Chief Complaint   Patient presents with   • Irregular Heart Beat     VPC 1 yr FOLLOW UP    :     HPI: Yuli Huang is a 67 y.o. female who presents today to followup. She has a history of extremely frequent PVCs and has even been noted to have some nonsustained  ventricular tachycardia on monitoring. It has looked like right ventricular outflow tract ectopy. She had a normal cardiac catheterization and a normal echocardiogram in . She has been maintained on metoprolol and she has done extremely well with this.     She completed chemotherapy and radiation for breast cancer in 2017.  She had serial echocardiograms due to Herceptin use, and thankfully her LVEF remained normal.      A left carotid bruit was reported earlier this year; a carotid doppler showed very mild (1-15%) atherosclerosis of the left internal carotid.    Past Medical History:   Diagnosis Date   • Atypical squamous cell changes of undetermined significance (ASCUS) on cervical cytology with negative high risk human papilloma virus (HPV) test result    • Breast cancer     Right Breast Stage IA, invasive mammary cancer associated with DCIS   • Breast cancer 2016    Left, stage IIIC   • Concussion    • Depression    • Diverticulosis of intestine 2017   • Fever blister    • Glaucoma    • History of migraine    • History of postmenopausal HRT     Post menopause hormones for 4 years   • History of radiation therapy 07/10/2013    07/10/2013 - 2013  right breast, 2017-3/2017 left breast   • Hyperlipidemia    • Hypertriglyceridemia    • Incontinence of urine    • LGSIL of cervix of undetermined significance    • Nonocclusive coronary atherosclerosis of native coronary artery     2014: 10-20% LI    • NSVT (nonsustained ventricular  "tachycardia)     RVOT tachycardia   • Osteoarthritis    • Osteopenia 2017   • Peripheral neuropathy due to chemotherapy 8/15/2016   • PONV (postoperative nausea and vomiting)    • Post-menopause    • Scarlet fever    • Status post chemotherapy 2017 - 2016 -  4 drugs -Herceptin, perjeta, carboplatin, taxotere.  Then continued every three weeks with Herceptin until 2017.    • Urinary frequency    • Vertigo    • VPC (ventricular premature complex)        Past Surgical History:   Procedure Laterality Date   • BACK SURGERY      Discectomy   • BREAST BIOPSY      Stereotactic biopsey of right breast   • BREAST LUMPECTOMY Right 2013   • BREAST LUMPECTOMY WITH SENTINEL NODE BIOPSY AND AXILLARY NODE DISSECTION Left 10/5/2016    Procedure: LT BREAST LUMPECTOMY WITH MAMMO NEEDLE LOC W/ SENTINEL NODE BIOPSY AND POSS AXILLARY NODE DISSECTION;  Surgeon: Denis Cortes MD;  Location: [facility];  Service:    • BREAST SURGERY  2013    Lumpectomy   •  SECTION      baby boy \"RAPHAEL\"   • CLOSED REDUCTION TIBIAL SHAFT FRACTURE     • COLONOSCOPY     • EYE SURGERY     • LUMBAR FUSION     • MASTECTOMY Left 2016    Procedure: LEFT BREAST MASTECTOMY WITH LEFT AXILLARY NODE DISSECTION;  Surgeon: Denis Cortes MD;  Location: [facility];  Service:    • OTHER SURGICAL HISTORY      cardiac cath   • POSTPARTUM TUBAL LIGATION     • SENTINEL LYMPH NODE BIOPSY         Social History     Social History   • Marital status:      Spouse name: N/A   • Number of children: 1   • Years of education: 13     Occupational History   •  [employer]   •  Retired     Social History Main Topics   • Smoking status: Former Smoker     Packs/day: 2.00     Start date:      Quit date:    • Smokeless tobacco: Never Used      Comment: Quit for 10 years.  2 packs / day for 30 years (60 pack years).   • Alcohol use 0.6 oz/week     1 Glasses of wine " per week      Comment: caffeine use: ONE CUP DAILY / WINE 1-2 X PER MONTH.    • Drug use: No   • Sexual activity: Defer     Other Topics Concern   • Not on file     Social History Narrative       Family History   Problem Relation Age of Onset   • Heart disease Father    • Abnormal EKG Father    • Heart attack Father 55   • Breast cancer Mother 81   • Diabetes type II Mother    • Breast cancer Sister 68     BRCA NEGATIVE    • No Known Problems Maternal Grandmother    • No Known Problems Maternal Grandfather    • No Known Problems Paternal Grandmother    • No Known Problems Paternal Grandfather    • Heart attack Paternal Uncle 54   • Heart attack Paternal Uncle 54   • No Known Problems Sister    • Celiac disease Sister    • No Known Problems Son      DEJON        Review of Systems   Constitution: Positive for malaise/fatigue.   Cardiovascular: Negative for palpitations.   Respiratory: Negative for shortness of breath.    All other systems reviewed and are negative.      Allergies   Allergen Reactions   • Nitrofurantoin Anaphylaxis   • Amoxicillin Diarrhea and Rash         Current Outpatient Prescriptions:   •  atorvastatin (LIPITOR) 40 MG tablet, Take 40 mg by mouth Every Evening., Disp: , Rfl:   •  Calcium Carbonate (CALTRATE 600 PO), Take 1 tablet by mouth Every Morning., Disp: , Rfl:   •  diclofenac (VOLTAREN) 75 MG EC tablet, Take 75 mg by mouth., Disp: , Rfl:   •  diphenoxylate-atropine (LOMOTIL) 2.5-0.025 MG per tablet, Take 2 tablets by mouth 4 (Four) Times a Day As Needed for diarrhea., Disp: , Rfl:   •  famciclovir (FAMVIR) 500 MG tablet, Take 500 mg by mouth 3 (Three) Times a Day As Needed (cold sore)., Disp: , Rfl:   •  folic acid (FOLVITE) 400 MCG tablet, Take 400 mcg by mouth Every Morning., Disp: , Rfl:   •  hyoscyamine (ANASPAZ,LEVSIN) 0.125 MG tablet, Take 0.125 mg by mouth Every 4 (Four) Hours As Needed (overactive bladder)., Disp: , Rfl:   •  LUMIGAN 0.01 % ophthalmic drops, , Disp: , Rfl:   •   "meclizine (ANTIVERT) 25 MG tablet, Take 25 mg by mouth 3 (Three) Times a Day As Needed., Disp: , Rfl:   •  metoprolol succinate XL (TOPROL-XL) 25 MG 24 hr tablet, TAKE ONE-HALF TABLET BY MOUTH DAILY, Disp: 45 tablet, Rfl: 0  •  Multiple Vitamins-Minerals (MULTIVITAL PO), Take 1 tablet/day by mouth Every Morning., Disp: , Rfl:   •  saccharomyces boulardii (FLORASTOR) 250 MG capsule, Take 250 mg by mouth 2 (Two) Times a Day., Disp: , Rfl:   •  silver sulfadiazine (SILVADENE) 1 % cream, Apply  topically 2 (Two) Times a Day., Disp: 50 g, Rfl: 1     Objective:     Vitals:    12/27/17 1057 12/27/17 1109   BP: 140/80 122/78   BP Location: Right arm    Pulse: 77    Weight: 79.7 kg (175 lb 9.6 oz)    Height: 165.1 cm (65\")      Body mass index is 29.22 kg/(m^2).    Physical Exam   Constitutional: She is oriented to person, place, and time. She appears well-developed and well-nourished.   HENT:   Head: Normocephalic.   Nose: Nose normal.   Mouth/Throat: Oropharynx is clear and moist.   Eyes: Conjunctivae and EOM are normal. Pupils are equal, round, and reactive to light.   Neck: Normal range of motion. No JVD present.   Cardiovascular: Normal rate, regular rhythm, normal heart sounds and intact distal pulses.    No murmur heard.  Pulmonary/Chest: Effort normal and breath sounds normal.   Abdominal: Soft. She exhibits no mass. There is no tenderness.   Musculoskeletal: Normal range of motion. She exhibits no edema.   Lymphadenopathy:     She has no cervical adenopathy.   Neurological: She is alert and oriented to person, place, and time. No cranial nerve deficit.   Skin: Skin is warm and dry. No rash noted.   She has lost a lot of her hair due to chemo     Psychiatric: She has a normal mood and affect. Her behavior is normal. Judgment and thought content normal.   Vitals reviewed.        ECG 12 Lead  Date/Time: 12/27/2017 11:57 AM  Performed by: SYMONE DUNCAN  Authorized by: SYMONE DUNCAN   Comparison: compared with previous " ECG   Similar to previous ECG  Rhythm: sinus rhythm  Conduction: conduction normal  ST Segments: ST segments normal  T Waves: T waves normal  QRS axis: normal  Other: no other findings  Clinical impression: normal ECG              Assessment:       Diagnosis Plan   1. VPC (ventricular premature complex)     2. NSVT (nonsustained ventricular tachycardia)     3. Nonocclusive coronary atherosclerosis of native coronary artery     4. Carotid atherosclerosis, left            Plan:       She has a history of PVC's and NSVT from an RVOT source.  She has essentially normal coronaries (luminal irregularities only, 10-20%) and a structurally normal heart.  She's doing well with metoprolol and is asymptomatic.  She was noted to have very mild atherosclerosis of the left ICA; I don't notice a bruit today.  She's on a statin; I recommend low dose aspirin daily.    Sincerely,       Phong Sunshine MD

## 2018-01-23 ENCOUNTER — OFFICE VISIT (OUTPATIENT)
Dept: ONCOLOGY | Facility: CLINIC | Age: 68
End: 2018-01-23

## 2018-01-23 ENCOUNTER — TRANSCRIBE ORDERS (OUTPATIENT)
Dept: ADMINISTRATIVE | Facility: HOSPITAL | Age: 68
End: 2018-01-23

## 2018-01-23 ENCOUNTER — LAB (OUTPATIENT)
Dept: LAB | Facility: HOSPITAL | Age: 68
End: 2018-01-23

## 2018-01-23 VITALS
HEART RATE: 80 BPM | HEIGHT: 66 IN | RESPIRATION RATE: 14 BRPM | SYSTOLIC BLOOD PRESSURE: 132 MMHG | DIASTOLIC BLOOD PRESSURE: 82 MMHG | OXYGEN SATURATION: 95 % | WEIGHT: 177.2 LBS | TEMPERATURE: 98.8 F | BODY MASS INDEX: 28.48 KG/M2

## 2018-01-23 DIAGNOSIS — Z85.3 HISTORY OF BREAST CANCER: Primary | ICD-10-CM

## 2018-01-23 DIAGNOSIS — C50.212 MALIGNANT NEOPLASM OF UPPER-INNER QUADRANT OF LEFT FEMALE BREAST (HCC): ICD-10-CM

## 2018-01-23 DIAGNOSIS — Z12.31 SCREENING MAMMOGRAM, ENCOUNTER FOR: Primary | ICD-10-CM

## 2018-01-23 DIAGNOSIS — Z12.31 ENCOUNTER FOR SCREENING MAMMOGRAM FOR BREAST CANCER: ICD-10-CM

## 2018-01-23 LAB
BASOPHILS # BLD AUTO: 0.04 10*3/MM3 (ref 0–0.1)
BASOPHILS NFR BLD AUTO: 0.7 % (ref 0–1.1)
DEPRECATED RDW RBC AUTO: 41.3 FL (ref 37–49)
EOSINOPHIL # BLD AUTO: 0.05 10*3/MM3 (ref 0–0.36)
EOSINOPHIL NFR BLD AUTO: 0.9 % (ref 1–5)
ERYTHROCYTE [DISTWIDTH] IN BLOOD BY AUTOMATED COUNT: 11.5 % (ref 11.7–14.5)
HCT VFR BLD AUTO: 38.6 % (ref 34–45)
HGB BLD-MCNC: 13.7 G/DL (ref 11.5–14.9)
IMM GRANULOCYTES # BLD: 0.03 10*3/MM3 (ref 0–0.03)
IMM GRANULOCYTES NFR BLD: 0.5 % (ref 0–0.5)
LYMPHOCYTES # BLD AUTO: 1.44 10*3/MM3 (ref 1–3.5)
LYMPHOCYTES NFR BLD AUTO: 25.4 % (ref 20–49)
MCH RBC QN AUTO: 35.4 PG (ref 27–33)
MCHC RBC AUTO-ENTMCNC: 35.5 G/DL (ref 32–35)
MCV RBC AUTO: 99.7 FL (ref 83–97)
MONOCYTES # BLD AUTO: 0.57 10*3/MM3 (ref 0.25–0.8)
MONOCYTES NFR BLD AUTO: 10.1 % (ref 4–12)
NEUTROPHILS # BLD AUTO: 3.53 10*3/MM3 (ref 1.5–7)
NEUTROPHILS NFR BLD AUTO: 62.4 % (ref 39–75)
NRBC BLD MANUAL-RTO: 0 /100 WBC (ref 0–0)
PLATELET # BLD AUTO: 221 10*3/MM3 (ref 150–375)
PMV BLD AUTO: 9.1 FL (ref 8.9–12.1)
RBC # BLD AUTO: 3.87 10*6/MM3 (ref 3.9–5)
WBC NRBC COR # BLD: 5.66 10*3/MM3 (ref 4–10)

## 2018-01-23 PROCEDURE — 99213 OFFICE O/P EST LOW 20 MIN: CPT | Performed by: INTERNAL MEDICINE

## 2018-01-23 PROCEDURE — 85025 COMPLETE CBC W/AUTO DIFF WBC: CPT | Performed by: INTERNAL MEDICINE

## 2018-01-23 PROCEDURE — 36416 COLLJ CAPILLARY BLOOD SPEC: CPT | Performed by: INTERNAL MEDICINE

## 2018-01-23 RX ORDER — CREAM BASE NO.135
CREAM (GRAM) MISCELLANEOUS
COMMUNITY
Start: 2018-01-08 | End: 2018-05-24

## 2018-01-23 NOTE — PROGRESS NOTES
[facility] OUTPATIENT CLINIC FOLLOW UP VISIT    REASON FOR FOLLOW-UP:      Malignant neoplasm of upper-inner quadrant of left female breast    Staging form: Breast, AJCC V7      Clinical stage from 4/14/2016: Stage IIIC (T3, N3b, M0) - Signed by Jordon Bonilla MD on 4/28/2016    Malignant neoplasm of upper-outer quadrant of right female breast    Staging form: Breast, AJCC V7      Clinical stage from 3/25/2016: Stage IA (rT1a, N0, M0) - Signed by Erika Osborn MD on 3/25/2016    HISTORY OF PRESENT ILLNESS:  Yuli Huang is a 67 y.o. female who returns today for follow up of the above issue.      She completed Herceptin on 4/21/2017.  She had her Mediport removed on 5/18/2017 by Dr. Cortes.  Intermittent sternal discomfort persists.  Peripheral neuropathy has nearly completely resolved in her fingers and is much improved in her feet.  She continues a B complex vitamin.  She has not noticed any new issues with the left chest wall or right breast.    ONCOLOGIC HISTORY:     Malignant neoplasm of upper-outer quadrant of right female breast    5/13/2013 Initial Diagnosis            5/13/2013 Biopsy     Biopsy:  Ductal carcinoma in situ.  ER 20%, OR 1-4%.  Her2 not done.         6/4/2013 Surgery     Lumpectomy by Dr. Denis Cortes.  2mm invasive cancer associated with DCIS.  ER/OR negative on the DCIS; unable to be performed on the invasive component.           6/18/2013 Surgery     [place] lymph node biopsy:  3 nodes negative.         7/10/2013 - 8/23/2013 Radiation     Radiation therapy to the right breast.           Malignant neoplasm of upper-inner quadrant of left female breast    3/31/2016 Imaging     Ultrasound left breast:  10 o'clock position, 3x2cm mass with a 2cm axillary lymph node.         4/7/2016 Biopsy     Ultrasound-guided biopsy of the left breast and axillary lymph node.  ER/OR negative, HER-2 overexpressed.  Grade 2.           4/19/2016 Imaging     PET scan:  Left breast mass  demonstrate significant metabolic activity.  There is a single 1.7 cm lymph node at the left axilla which  demonstrates significant metabolic activity for its size. There is also  a subcentimeter node in the left internal mammary chain which  nonetheless demonstrates discernible activity. I suspect an early erasto  metastasis. No further evidence for metastatic disease is present.         4/21/2016 Imaging     Breast MRI:  1. Biopsy-proven malignancy in the left breast extending from the 8:30  o'clock through 1 o'clock positions and measuring up to 7.6 cm in  greatest dimension. Adjacent satellite clumped foci of enhancement are  also noted in the upper outer and lower outer quadrants. Left axillary  adenopathy is also appreciated.  2. There are no findings suspicious for malignancy in the right breast.  Postbreast conservation therapy changes of the right breast are noted.         4/29/2016 - 8/15/2016 Chemotherapy     Neoadjuvant TCHP         9/8/2016 Imaging     MRI breasts:  1. Findings consistent with significant interval response to neoadjuvant  chemotherapy in the left breast. However, there remains some scattered  stippled foci of enhancement that are asymmetric in the left breast  compared to the right breast and they are from the 8 o'clock through 12  o'clock positions in distribution. This corresponds to a region that was  previously occupied by considerable neoplastic disease/malignancy, thus  microscopic multifocal disease is suspected. There has been resolution  of left axillary adenopathy.  2. There are no findings suspicious for malignancy in the right breast.         10/5/2016 Surgery     Lumpectomy with sentinel lymph node biopsy:  Breast:  DCIS spanning 3.3cm.  ER/NM negative.  Multifocal.  No invasive carcinoma  1 of 2 sentinel nodes positive for carcinoma measuring 1.1mm without extracapsular extension.           11/16/2016 Surgery     Left modified radical mastectomy with axillary lymph node  sampling by Dr. Cortes.  High grade DCIS with margins <1mm.  10 benign axillary lymph nodes.         1/9/2017 Imaging     PET scan:  IMPRESSION:  The low-level activity at the left axillary surgical bed may  represent residual postoperative change. Residual malignancy in this  region cannot be entirely excluded. There is otherwise no abnormal  hypermetabolic activity or convincing evidence for metastatic disease.         1/18/2017 - 3/7/2017 Radiation             - 4/21/2017 Chemotherapy     OP BREAST Trastuzumab Q21D (maintenance)                 PAST MEDICAL, SURGICAL, FAMILY, AND SOCIAL HISTORIES WERE REVIEWED WITH THE PATIENT AND IN THE ELECTRONIC MEDICAL RECORD, AND WERE UPDATED IF INDICATED.    ALLERGIES:  Allergies   Allergen Reactions   • Nitrofurantoin Anaphylaxis   • Amoxicillin Diarrhea and Rash       MEDICATIONS:  The medication list has been reviewed with the patient by the medical assistant, and the list has been updated in the electronic medical record, which I reviewed.  Medication dosages and frequencies were confirmed to be accurate.    REVIEW OF SYSTEMS:  PAIN:  See Vital Signs below.  GENERAL:  No fevers, chills, night sweats, or unintended weight loss.  SKIN:  Hair is regrowing nicely on the scalp.  Skin changes from radiation nearly resolved.  HEME/LYMPH:  No abnormal bleeding.  No palpable lymphadenopathy.  EYES:  No vision changes or diplopia.  ENT:  No mucositis or ulcers  RESPIRATORY:  No cough, shortness of breath, hemoptysis, or wheezing.  CARDIOVASCULAR:  No chest pain, palpitations, orthopnea, or dyspnea on exertion.  GASTROINTESTINAL: Did not complain of any issues today  GENITOURINARY:  No dysuria or hematuria.  MUSCULOSKELETAL:  Improving range of motion of the left shoulder.    NEUROLOGIC:  See history of present illness  PSYCHIATRIC:  Anxiety improved.    Vitals:    01/23/18 1401   BP: 132/82   Pulse: 80   Resp: 14   Temp: 98.8 °F (37.1 °C)   TempSrc: Oral   SpO2: 95%   Weight: 80.4  "kg (177 lb 3.2 oz)   Height: 167 cm (65.75\")  Comment: new ht w/shoes   PainSc: 0-No pain       PHYSICAL EXAMINATION:  GENERAL:  Well-developed well-nourished female; awake, alert and oriented, in no acute distress.  SKIN: Significant erythema with areas of crusting on the left chest from radiation  HEAD:  Normocephalic, atraumatic. Alopecia   EYES:  Pupils equal, round and reactive to light.  Extraocular movements intact.  Conjunctivae normal.  EARS:  Hearing intact.  NOSE:  Septum midline.  No excoriations or nasal discharge.  MOUTH:  No stomatitis or ulcers.  Lips are normal.  THROAT:  Oropharynx without lesions or exudates.  NECK:  Supple with good range of motion; no thyromegaly or masses; no JVD or bruits.  LYMPHATICS:  No cervical, supraclavicular, axillary, or inguinal lymphadenopathy.  CHEST:  Lungs are clear to auscultation bilaterally.  No wheezes, rales, or rhonchi.  A Mediport is present and is benign in appearance in the right subclavian area.  BREASTS: I examined the left chest wall today.  No nodules or skin changes.  Healed surgical incision in the upper outer quadrant of the right breast with some mild tenderness in this area.  No axillary adenopathy.  HEART:  Regular rate; normal rhythm.  No murmurs, gallops or rubs.  ABDOMEN:  Soft, non-tender, non-distended.  Normal active bowel sounds.  No organomegaly  EXTREMITIES:  No clubbing, cyanosis, or edema.  NEUROLOGICAL:  No focal neurologic deficits.  BREASTS:  Not examined today.    DIAGNOSTIC DATA:  Lab Results   Component Value Date    WBC 5.66 01/23/2018    HGB 13.7 01/23/2018    HCT 38.6 01/23/2018    MCV 99.7 (H) 01/23/2018     01/23/2018       Lab Results   Component Value Date    GLUCOSE 137 (H) 11/09/2016    BUN 17 11/09/2016    CREATININE 0.90 04/17/2017    EGFRIFNONA 86 11/09/2016    EGFRIFAFRI  09/16/2016      Comment:      <15 Indicative of kidney failure.    BCR 24.6 11/09/2016    CO2 26.9 11/09/2016    CALCIUM 8.8 11/09/2016    " ALBUMIN 3.80 11/09/2016    LABIL2 1.7 11/09/2016    AST 22 11/09/2016    ALT 22 11/09/2016     Lab Results   Component Value Date    GLUCOSE 137 (H) 11/09/2016    CALCIUM 8.8 11/09/2016     11/09/2016    K 3.7 11/09/2016    CO2 26.9 11/09/2016     11/09/2016    BUN 17 11/09/2016    CREATININE 0.90 04/17/2017    EGFRIFAFRI  09/16/2016      Comment:      <15 Indicative of kidney failure.    EGFRIFNONA 86 11/09/2016    BCR 24.6 11/09/2016    ANIONGAP 13.1 11/09/2016     Imaging: None reviewed    ASSESSMENT:  This is a 67 y.o. female with:  1.  History of stage I carcinoma of the right breast.  The biopsy initially showed DCIS.  There was a tiny invasive component on the surgical specimen.  This was minimally hormone receptor positive and HER-2 was not able to be tested.  She had radiation following her surgery but no medical therapy.  2.  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  She completed 6 cycles of neoadjuvant chemotherapy with TCH P followed by a lumpectomy on 10/5/2016.  No residual invasive carcinoma in the breast but there was extensive DCIS with very close margins.  One lymph node was positive for metastatic carcinoma.  We discussed her case at the multidisciplinary breast conference.  She had a mammogram that showed some persistent suspicious calcifications and therefore on 11/16/2016 had a left modified radical mastectomy with axillary lymph node sampling.  High-grade DCIS was present but no invasive carcinoma and no lymph nodes were positive.  Margins were very close.  She had a PET scan that was negative.  She completed radiation therapy.  She completed Herceptin on 4/21/2017.  3.  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  Continues to improve.  4.  Radiation dermatitis:  Resolved.  5.  Mild sternal discomfort: Likely from radiation.    PLAN:   1.   Continue a vitamin B complex vitamin  2.  Right screening mammogram in April.  For some reason Epic would not let me order a screening right mammogram today with or without tomosynthesis.  She does request tomosynthesis.  3.  I will see her back in 6 months for follow-up with a breast exam and a CBC.

## 2018-04-12 DIAGNOSIS — C50.212 MALIGNANT NEOPLASM OF UPPER-INNER QUADRANT OF LEFT FEMALE BREAST, UNSPECIFIED ESTROGEN RECEPTOR STATUS (HCC): Primary | ICD-10-CM

## 2018-04-13 ENCOUNTER — DOCUMENTATION (OUTPATIENT)
Dept: ONCOLOGY | Facility: CLINIC | Age: 68
End: 2018-04-13

## 2018-04-23 ENCOUNTER — APPOINTMENT (OUTPATIENT)
Dept: MAMMOGRAPHY | Facility: HOSPITAL | Age: 68
End: 2018-04-23
Attending: INTERNAL MEDICINE

## 2018-04-24 ENCOUNTER — APPOINTMENT (OUTPATIENT)
Dept: MAMMOGRAPHY | Facility: HOSPITAL | Age: 68
End: 2018-04-24
Attending: INTERNAL MEDICINE

## 2018-04-25 ENCOUNTER — HOSPITAL ENCOUNTER (OUTPATIENT)
Dept: MAMMOGRAPHY | Facility: HOSPITAL | Age: 68
Discharge: HOME | End: 2018-04-25
Attending: INTERNAL MEDICINE | Admitting: INTERNAL MEDICINE

## 2018-04-25 DIAGNOSIS — Z85.3 HISTORY OF BREAST CANCER: ICD-10-CM

## 2018-04-25 DIAGNOSIS — Z12.31 ENCOUNTER FOR SCREENING MAMMOGRAM FOR BREAST CANCER: ICD-10-CM

## 2018-04-25 DIAGNOSIS — Z12.31 SCREENING MAMMOGRAM, ENCOUNTER FOR: ICD-10-CM

## 2018-04-25 PROCEDURE — 77067 SCR MAMMO BI INCL CAD: CPT

## 2018-04-25 PROCEDURE — 77063 BREAST TOMOSYNTHESIS BI: CPT

## 2018-05-01 ENCOUNTER — HOSPITAL ENCOUNTER (OUTPATIENT)
Dept: PHYSICAL THERAPY | Facility: HOSPITAL | Age: 68
Setting detail: THERAPIES SERIES
Discharge: HOME | End: 2018-05-01

## 2018-05-01 DIAGNOSIS — C50.212 MALIGNANT NEOPLASM OF UPPER-INNER QUADRANT OF LEFT FEMALE BREAST, UNSPECIFIED ESTROGEN RECEPTOR STATUS (HCC): ICD-10-CM

## 2018-05-01 DIAGNOSIS — Z85.3 HISTORY OF BREAST CANCER: ICD-10-CM

## 2018-05-01 DIAGNOSIS — Z90.12 S/P LEFT MASTECTOMY: ICD-10-CM

## 2018-05-01 DIAGNOSIS — Z48.89 AFTERCARE FOLLOWING SURGERY: ICD-10-CM

## 2018-05-01 DIAGNOSIS — M25.512 ACUTE PAIN OF LEFT SHOULDER: ICD-10-CM

## 2018-05-01 DIAGNOSIS — M25.612 STIFF SHOULDER, LEFT: ICD-10-CM

## 2018-05-01 DIAGNOSIS — Z98.890 S/P LUMPECTOMY, LEFT BREAST: Primary | ICD-10-CM

## 2018-05-01 DIAGNOSIS — R29.3 ABNORMAL POSTURE: ICD-10-CM

## 2018-05-01 DIAGNOSIS — C50.411 MALIGNANT NEOPLASM OF UPPER-OUTER QUADRANT OF RIGHT FEMALE BREAST, UNSPECIFIED ESTROGEN RECEPTOR STATUS (HCC): ICD-10-CM

## 2018-05-01 PROCEDURE — 97110 THERAPEUTIC EXERCISES: CPT | Performed by: PHYSICAL THERAPIST

## 2018-05-01 PROCEDURE — G8984 CARRY CURRENT STATUS: HCPCS | Performed by: PHYSICAL THERAPIST

## 2018-05-01 PROCEDURE — G8985 CARRY GOAL STATUS: HCPCS | Performed by: PHYSICAL THERAPIST

## 2018-05-01 PROCEDURE — 93702 BIS XTRACELL FLUID ANALYSIS: CPT | Performed by: PHYSICAL THERAPIST

## 2018-05-01 NOTE — THERAPY RE-EVALUATION
Physical Therapy Lymphedema Re-Evaluation   [facility]     Patient Name: Yuli Huang  : 1950  MRN: [id number]  Today's Date: 2018      Visit Date: 2018    Visit Dx:    ICD-10-CM ICD-9-CM   1. S/P lumpectomy, left breast Z98.890 V45.89   2. Aftercare following surgery Z48.89 V58.89   3. Acute pain of left shoulder M25.512 719.41   4. Stiff shoulder, left M25.612 719.51   5. S/P left mastectomy Z90.12 V45.71   6. Abnormal posture R29.3 781.92   7. Malignant neoplasm of upper-inner quadrant of left female breast, unspecified estrogen receptor status C50.212 174.2   8. Malignant neoplasm of upper-outer quadrant of right female breast, unspecified estrogen receptor status C50.411 174.4   9. History of breast cancer Z85.3 V10.3       Patient Active Problem List   Diagnosis   • Malignant neoplasm of upper-outer quadrant of right female breast   • Malignant neoplasm of upper-inner quadrant of left female breast   • Mucositis due to chemotherapy   • Peripheral neuropathy due to chemotherapy   • NSVT (nonsustained ventricular tachycardia)   • VPC (ventricular premature complex)   • Breast cancer, left   • Fitting and adjustment of vascular catheter   • Arthritis   • Diverticulosis of intestine   • Impaired glucose tolerance   • Hyperlipidemia   • Osteopenia   • Infiltrating ductal carcinoma of breast, stage 1   • Menopause   • History of breast cancer   • Encounter for screening mammogram for breast cancer        Past Medical History:   Diagnosis Date   • Atypical squamous cell changes of undetermined significance (ASCUS) on cervical cytology with negative high risk human papilloma virus (HPV) test result    • Breast cancer     Right Breast Stage IA, invasive mammary cancer associated with DCIS   • Breast cancer 2016    Left, stage IIIC   • Concussion    • Depression    • Diverticulosis of intestine 2017   • Fever blister    • Glaucoma    • History of migraine    • History of  "postmenopausal HRT     Post menopause hormones for 4 years   • History of radiation therapy 07/10/2013    07/10/2013 - 2013  right breast, 2017-3/2017 left breast   • Hyperlipidemia    • Hypertriglyceridemia    • Incontinence of urine    • LGSIL of cervix of undetermined significance    • Nonocclusive coronary atherosclerosis of native coronary artery     2014: 10-20% LI    • NSVT (nonsustained ventricular tachycardia)     RVOT tachycardia   • Osteoarthritis    • Osteopenia 2017   • Peripheral neuropathy due to chemotherapy 8/15/2016   • PONV (postoperative nausea and vomiting)    • Post-menopause    • Scarlet fever    • Status post chemotherapy 2017 - 2016 -  4 drugs -Herceptin, perjeta, carboplatin, taxotere.  Then continued every three weeks with Herceptin until 2017.    • Urinary frequency    • Vertigo    • VPC (ventricular premature complex)         Past Surgical History:   Procedure Laterality Date   • BACK SURGERY      Discectomy   • BREAST BIOPSY      Stereotactic biopsey of right breast   • BREAST LUMPECTOMY Right 2013   • BREAST LUMPECTOMY WITH SENTINEL NODE BIOPSY AND AXILLARY NODE DISSECTION Left 10/5/2016    Procedure: LT BREAST LUMPECTOMY WITH MAMMO NEEDLE LOC W/ SENTINEL NODE BIOPSY AND POSS AXILLARY NODE DISSECTION;  Surgeon: Denis Cortes MD;  Location: [facility];  Service:    • BREAST SURGERY  2013    Lumpectomy   •  SECTION      baby boy \"RAPHAEL\"   • CLOSED REDUCTION TIBIAL SHAFT FRACTURE     • COLONOSCOPY     • EYE SURGERY     • LUMBAR FUSION     • MASTECTOMY Left 2016    Procedure: LEFT BREAST MASTECTOMY WITH LEFT AXILLARY NODE DISSECTION;  Surgeon: Denis Cortes MD;  Location: [facility];  Service:    • OTHER SURGICAL HISTORY      cardiac cath   • POSTPARTUM TUBAL LIGATION     • SENTINEL LYMPH NODE BIOPSY         Visit Dx:    ICD-10-CM ICD-9-CM   1. S/P lumpectomy, left breast Z98.890 " V45.89   2. Aftercare following surgery Z48.89 V58.89   3. Acute pain of left shoulder M25.512 719.41   4. Stiff shoulder, left M25.612 719.51   5. S/P left mastectomy Z90.12 V45.71   6. Abnormal posture R29.3 781.92   7. Malignant neoplasm of upper-inner quadrant of left female breast, unspecified estrogen receptor status C50.212 174.2   8. Malignant neoplasm of upper-outer quadrant of right female breast, unspecified estrogen receptor status C50.411 174.4   9. History of breast cancer Z85.3 V10.3                 Lymphedema     Row Name 05/01/18 1300             Subjective Pain    Able to rate subjective pain? yes  -SC      Pre-Treatment Pain Level 0  -SC      Post-Treatment Pain Level 0  -SC         Subjective Comments    Subjective Comments Doing ok but I am having an ankle issues now. Trying to get into a ortho. Very swollen. I have gained weight so my left arm feels bigger and tight but no major issues. More I need to get my ankle looked at.   -SC         Lymphedema Measurements    Measurement Type(s) Quick Girth  -SC      Quick Girth Areas Upper extremities  -SC         LUE Quick Girth (cm)    Axilla 30.6 cm  -SC      Mid upper arm 30.2 cm  -SC      Elbow 23.4 cm  -SC      Mid forearm 22.5 cm  -SC      Wrist crease 15.2 cm  -SC      LUE Quick Girth Total 121.9  -SC         RUE Quick Girth (cm)    Axilla 30 cm  -SC      Mid upper arm 29.5 cm  -SC      Elbow 24.5 cm  -SC      Mid forearm 22.5 cm  -SC      Wrist crease 15.2 cm  -SC      RUE Quick Girth Total 121.7  -SC         L-Dex Lymphedema Screening    L-Dex Measurement Extremity LUE  -SC      L-Dex Patient Position Flat  -SC      L-Dex UE Dominate Side Right  -SC      L-Dex UE At Risk Side Left  -SC      L-Dex UE Pre Surgical Value No  -SC      L-Dex UE Score 4.4  -SC      L-Dex UE Baseline Score 5.1  -SC      L-Dex UE Value Change -0.7  -SC      L-Dex UE Comment WNL, normal  -SC      $ L-Dex Charge yes  -SC        User Key  (r) = Recorded By, (t) = Taken  By, (c) = Cosigned By    Initials Name Provider Type    DOUGLAS Velasquez, PT Physical Therapist                          Therapy Education  Education Details: bioimpedance test and results, orthos for foot and ankle pain, continuation of care  Given: Other (comment)  Program: New  How Provided: Verbal, Demonstration  Provided to: Patient  Level of Understanding: Teach back education performed, Verbalized, Demonstrated            Exercises     Row Name 05/01/18 1300             Subjective Comments    Subjective Comments Doing ok but I am having an ankle issues now. Trying to get into a ortho. Very swollen. I have gained weight so my left arm feels bigger and tight but no major issues. More I need to get my ankle looked at.   -SC         Subjective Pain    Able to rate subjective pain? yes  -SC      Pre-Treatment Pain Level 0  -SC      Post-Treatment Pain Level 0  -SC        User Key  (r) = Recorded By, (t) = Taken By, (c) = Cosigned By    Initials Name Provider Type    DOUGLAS Velasquez, PT Physical Therapist                              PT OP Goals     Row Name 05/01/18 1300          PT Short Term Goals    STG 1 Patient independent and compliant with initial home exercise program focused on diaphragmatic breathing, range of motion, flexibility to decrease edema and improve lymphatic flow for decreased edema and decreased risk of infection.   -SC     STG 1 Progress Met  -SC     STG 2 Patient demonstrate proper awareness of What is Lymphedema and 18 Steps of Prevention for improved prevention, management, care of symptoms and ease of transition to self-care of condition.   -SC     STG 2 Progress Met  -SC     STG 3 Patient independent and compliant with daytime OTS prevention compression garments as indicated for decreased risk of lymphedema and infection and safety with transition of self-care of condition.   -SC     STG 3 Progress Met  -SC     STG 4 Patient's PROM left shoulder flexion >/= 160 degrees for  improved mobility, decreased axillary cording syndrome, decreased risk of edema, and improved posture, function for return to prior level of functioning  -SC     STG 4 Progress Met  -SC        Long Term Goals    LTG Date to Achieve 07/31/18  -SC     LTG 1 Patient independent and compliant with advanced Home Exercise Program for self-management of condition.   -SC     LTG 1 Progress Met  -SC     LTG 2 Patient demonstrate proper awareness of Care and Air Travel safety with compression/exercise as indicated for improved safety with travel and self-care of condition.   -SC     LTG 2 Progress Met  -SC     LTG 3 Patient demonstrate independence and compliance with proper skin care during/post radiation for improved mobility, decreased scar tissue, axillary cording and edema.  -SC     LTG 3 Progress Met  -SC     LTG 4 Patient improved AROM left shoulder flexion in seated/standing positions >/= 160 degrees for improved function in home and community for safety with return to prior level of functioning and transition to self-care of condition.   -SC     LTG 4 Progress Met  -SC     LTG 5 Patient score </=25/100 on DASH for improved function and transition to self-management of condition.   -SC     LTG 5 Progress Met  -SC     LTG 6 Patient's bioimpedance score to remain between 1-7 for stabilization and decreased risk of developing stage II non reversible chronic post mastectomy lymphedema syndrome left UE.  -SC     LTG 6 Progress Progressing  -SC     LTG 6 Progress Comments currently 4.4, which is WNL and stable from baseline  -SC        Time Calculation    PT Goal Re-Cert Due Date 07/31/18  -SC       User Key  (r) = Recorded By, (t) = Taken By, (c) = Cosigned By    Initials Name Provider Type    SC Janet Velasquez, PT Physical Therapist                PT Assessment/Plan     Row Name 05/01/18 1300          PT Assessment    Assessment Comments Mrs. Huang returns today for 3 month f/u diagnostic assessment of lymphatics of  UEs with bioimpedance, current L-Dex score is 4.4, which is WNL and stable from baseline ready. She was highly encouraged to see orthopedist regarding left ankle pain and swelling however due to good progress with left UE she will return in 6 months for next f/u bioimpedance reading unless otherwise indicated.   -SC        PT Plan    PT Frequency Other (comment)  -SC     Predicted Duration of Therapy Intervention (OT Eval) 6 month f/u bioimpedance  -SC     PT Plan Comments 6 month f/u bioimpedance unless otherwise indicated  -SC       User Key  (r) = Recorded By, (t) = Taken By, (c) = Cosigned By    Initials Name Provider Type    SC Janet Velasquez PT Physical Therapist                       Time Calculation:   Start Time: 1300  Stop Time: 1335  Time Calculation (min): 35 min  Total Timed Code Minutes- PT: 35 minute(s)     Therapy Charges for Today     Code Description Service Date Service Provider Modifiers Qty    96145805311 HC PT BIS XTRACELL FLUID ANALYSIS 5/1/2018 Janet Velasquez, PT  1    62121657512 HC PT CARRY MOV HAND OBJ CURRENT 5/1/2018 Janet Velasquez PT GP, CI 1    35452326240 HC PT CARRY MOV HAND OBJ PROJECTED 5/1/2018 Janet Velasquez, PT GP, CI 1    65627562205 HC PT THER PROC EA 15 MIN 5/1/2018 Janet Velasquez, PT GP 1          PT G-Codes  PT Professional Judgement Used?: Yes  Functional Limitation: Carrying, moving and handling objects  Carrying, Moving and Handling Objects Current Status (): At least 1 percent but less than 20 percent impaired, limited or restricted  Carrying, Moving and Handling Objects Goal Status (): At least 1 percent but less than 20 percent impaired, limited or restricted         Janet Martinez PT  5/1/2018

## 2018-05-24 ENCOUNTER — OFFICE VISIT (OUTPATIENT)
Dept: OBSTETRICS AND GYNECOLOGY | Age: 68
End: 2018-05-24

## 2018-05-24 VITALS
BODY MASS INDEX: 31.32 KG/M2 | SYSTOLIC BLOOD PRESSURE: 170 MMHG | HEIGHT: 65 IN | WEIGHT: 188 LBS | DIASTOLIC BLOOD PRESSURE: 74 MMHG

## 2018-05-24 DIAGNOSIS — Z01.419 WELL WOMAN EXAM WITH ROUTINE GYNECOLOGICAL EXAM: Primary | ICD-10-CM

## 2018-05-24 DIAGNOSIS — N64.4 MASTALGIA IN FEMALE: ICD-10-CM

## 2018-05-24 DIAGNOSIS — R53.83 FATIGUE, UNSPECIFIED TYPE: ICD-10-CM

## 2018-05-24 DIAGNOSIS — R09.89 LEFT CAROTID BRUIT: ICD-10-CM

## 2018-05-24 DIAGNOSIS — M85.89 OSTEOPENIA OF MULTIPLE SITES: ICD-10-CM

## 2018-05-24 LAB
BILIRUB BLD-MCNC: NEGATIVE MG/DL
CLARITY, POC: CLEAR
COLOR UR: YELLOW
GLUCOSE UR STRIP-MCNC: NEGATIVE MG/DL
KETONES UR QL: NEGATIVE
LEUKOCYTE EST, POC: ABNORMAL
NITRITE UR-MCNC: NEGATIVE MG/ML
PH UR: 6 [PH] (ref 5–8)
PROT UR STRIP-MCNC: NEGATIVE MG/DL
RBC # UR STRIP: NEGATIVE /UL
SP GR UR: 1.03 (ref 1–1.03)
UROBILINOGEN UR QL: NORMAL

## 2018-05-24 PROCEDURE — 81003 URINALYSIS AUTO W/O SCOPE: CPT | Performed by: OBSTETRICS & GYNECOLOGY

## 2018-05-24 PROCEDURE — G0101 CA SCREEN;PELVIC/BREAST EXAM: HCPCS | Performed by: OBSTETRICS & GYNECOLOGY

## 2018-05-24 PROCEDURE — 99213 OFFICE O/P EST LOW 20 MIN: CPT | Performed by: OBSTETRICS & GYNECOLOGY

## 2018-05-24 RX ORDER — CELECOXIB 200 MG/1
200 CAPSULE ORAL
COMMUNITY
Start: 2018-03-01 | End: 2018-05-24 | Stop reason: SINTOL

## 2018-05-24 RX ORDER — MECLIZINE HYDROCHLORIDE 25 MG/1
25 TABLET ORAL
COMMUNITY
End: 2018-05-24 | Stop reason: SDUPTHER

## 2018-05-24 NOTE — PROGRESS NOTES
"Routine Annual Visit    May 24, 2018    Patient: Melvin Huang          MR#:[id number]    Chief Complaint   Patient presents with   • Gynecologic Exam     MELVIN IS HERE FOR HER ANNUAL EXAM.  LAST PAP , NEG PAP, NEG HPV.  MG 2018 W/ MANDI @ [facility], [place].  COLONOSCOPY , SHE WILL SCHEDULE IN DEC. 2018. TROUBLE EMPYTING BLADDER. PERSONAL HX OF BREAST CANCER AT AGE 63 AND 66.        67 y.o. female  who presents for annual exam.     HISTORY OF PRESENT ILLNESS:    GYN Complaints:    =\"Trouble emptying bladder\", having some dribbling after voiding when she stands up, but doesn't have ELIAZAR when bladder is full. Does have rare urge incontinence. Wears a pad all the time.  Feels like it is actually coming from the urethra. Discussed urodynamic testing.    =Breast pain, twinges but also painful at her SBE and M/G. This comes and goes, not particularly related to any event.    Other Complaints: Fatiqued, throughtout the day. Feels exhausted with small amounts of exertion.    GYN HISTORY:  1.  Menstrual Hx:  PM        HRT:  no         Current contraception: post menopausal status    2.  History of abnormal Pap smear: yes - LGSIL on pap smear in . .        Pap smear Hx:  ) Secondary or Friday\"-, normal yearly Pap smears.  , LGSIL on Pap.  Did not have a colposcopy, just repeated Paps every 3 months ×2, both were negative.  Then returned to annual Pap smears.  [id number], normal yearly Pap smears.  , ASCUS, negative HR-HPV.  Repeated Pap 6 months later, negative, 2006.  [id number], normal yearly Pap smears.  , , negative Pap smears, negative HR-HPV.  , neg pap, negative HR-HPV.     NEW PAST MEDICAL HISTORY:    3.  New Medical Hx:  None.  Past Medical History:   Diagnosis Date   • Atypical squamous cell changes of undetermined significance (ASCUS) on cervical cytology with negative high risk human papilloma virus (HPV) test result  and 2006: LGSIL on Pap, HPV " effect.  All subsequent Paps were neg until 2006, Pap with ASCUS.  Neg HR-HPV.  All subsequent Paps have been normal.  2012, '13, '17 neg paps, & clinic neg HR-HPV.   • Breast cancer 2013    Right Breast: Stage IA, invasive mammary cancer associated with DCIS.  Status post right lumpectomy with radiation.  Declined Arimidex therapy.   • Breast cancer 04/07/2016    Left, stage IIIC: S/P LEFT BREAST MASTECTOMY WITH LEFT AXILLARY NODE DISSECTION;  Surgeon: Denis Cortes MD;  Location: [facility] MAIN OR   • Concussion    • Depression    • Diverticulosis of sigmoid colon 2008    Colonoscopy by Dr. Tung Cortes: Mild sigmoid diverticulosis.  No masses or polyps.   • DUB (dysfunctional uterine bleeding) 1996    Dysfunctional uterine bleeding.  Probable anovulatory cycle.  Endometrial biopsy with proliferative endometrium.   • Fever blister    • Glaucoma    • History of migraine    • History of postmenopausal HRT     Post menopause hormones for 4 years   • History of radiation therapy 07/10/2013    07/10/2013 - 08/23/2013  right breast, 1/2017-3/2017 left breast   • Hyperlipidemia    • Hypertriglyceridemia    • Incontinence of urine    • LGSIL of cervix of undetermined significance 1995    Only on PAP SMEAR, HPV EFFECT, NOT TRUE MILD DYSPLASIA.   • Menopause 2002    Took HRT for about 4 years.   • Nonocclusive coronary atherosclerosis of native coronary artery     2014: 10-20% LI    • NSVT (nonsustained ventricular tachycardia)     RVOT tachycardia   • Osteoarthritis    • Osteopenia 5/22/2017   • Peripheral neuropathy due to chemotherapy 8/15/2016   • PMB (postmenopausal bleeding) 2005    Postmenopausal bleeding on Prempro 0.625/2.5.  Endometrial biopsy showed scant endometrium with hyperplastic polyp.    • PONV (postoperative nausea and vomiting)    • Scarlet fever    • Status post chemotherapy 04/17/2017 04/29/2016 - August 2016 -  4 drugs -Herceptin, perjeta, carboplatin, taxotere.  Then continued every three weeks  "with Herceptin until 2017.    • Urinary frequency    • Vertigo    • VPC (ventricular premature complex)            4.  New Surgical Hx:  None.  Past Surgical History:   Procedure Laterality Date   • BACK SURGERY      Discectomy   • BREAST BIOPSY      Stereotactic biopsey of right breast   • BREAST LUMPECTOMY Right 2013   • BREAST LUMPECTOMY WITH SENTINEL NODE BIOPSY AND AXILLARY NODE DISSECTION Left 10/5/2016    Procedure: LT BREAST LUMPECTOMY WITH MAMMO NEEDLE LOC W/ SENTINEL NODE BIOPSY AND POSS AXILLARY NODE DISSECTION;  Surgeon: Denis Cortes MD;  Location: [facility];  Service:    • BREAST SURGERY  2013    Lumpectomy   •  SECTION      baby boy \"RAPHAEL\"   • CLOSED REDUCTION TIBIAL SHAFT FRACTURE     • COLONOSCOPY     • EYE SURGERY     • LUMBAR FUSION     • MASTECTOMY Left 2016    Procedure: LEFT BREAST MASTECTOMY WITH LEFT AXILLARY NODE DISSECTION;  Surgeon: Denis Cortes MD;  Location: [facility];  Service:    • OTHER SURGICAL HISTORY      cardiac cath   • POSTPARTUM TUBAL LIGATION     • SENTINEL LYMPH NODE BIOPSY             5.  New Family Medical Hx:  None.  Family History   Problem Relation Age of Onset   • Heart disease Father    • Abnormal EKG Father    • Heart attack Father 55   • Breast cancer Mother 81   • Diabetes type II Mother    • Breast cancer Sister 68        BRCA NEGATIVE    • No Known Problems Maternal Grandmother    • No Known Problems Maternal Grandfather    • No Known Problems Paternal Grandmother    • No Known Problems Paternal Grandfather    • Heart attack Paternal Uncle 54   • Heart attack Paternal Uncle 54   • No Known Problems Sister    • Celiac disease Sister    • No Known Problems Son         MARINES          6.  SCREENINGS:  =Family history of breast cancer: yes - Sister, and personal history of breast cancer Left breast cancer: .  Stage IIIc.  Left breast lumpectomy with sentinel node biopsy, axillary node " "dissection.  Chemotherapy April 29, 2000 16 August 2016: 4 drugs, Herceptin, progestin, carboplatin, Taxotere.  Continued every 3 weeks with Herceptin until April 17, 2017.  Radiation therapy January 2017 to March 2017. Mother dx @ 81  Perform regular self breast exam: yes - monthly   Breast self-examination technique  reviewed and the patient encouraged to perform self breast exams monthly.     =Family history of ovarian cancer: no  =Family history of uterine cancer: no  =Family History of colon cancer: no      Mammogram: up to date.  04/25/2018 w/ Pee @ [facility].   Colonoscopy: recommended.  Yuli is due in Dec. 2018 for colono.    7.  LIFESTYLE:  =Diet: Trying to be healthy..   = We discussed healthy lifestyle modifications.      =Exercise:  yes - once per week. Left ankle pain with swelling.  Appt June 11 with Dr. Fuentes.      =Calcium  no.  =Vitamin D:  yes - Multivits.   = We discussed calcium intake needs to help prevent osteoporosis:      Recommended 600 mg of calcium daily and 1000 IUs of vitamin D 3 daily.      Review of Systems   Constitutional: Positive for fatigue.   Respiratory: Positive for shortness of breath.    Gastrointestinal:        HEARTBURN   Genitourinary: Positive for difficulty urinating.   Musculoskeletal:        JOINT SWELLING  LIMB PAIN  ANKLE SWELLING    Skin:        CHANGE IN MOLE   BREAST PAIN    Neurological: Positive for weakness.   Hematological: Bruises/bleeds easily.   All other systems reviewed and are negative.      Objective     /74   Ht 165.1 cm (65\")   Wt 85.3 kg (188 lb)   LMP  (LMP Unknown)   Breastfeeding? No   BMI 31.28 kg/m²     PHYSICAL EXAM    Constitutional: Well-developed, well-nourished, slightly obese  female, in no distress, alert and well oriented ×3.    Skin is warm, dry, without rash.       Neck appears normal, trachea in the midline.  Thyroid exam normal.  Still has faint left carotid bruit.  None heard in the right carotid.        No " cervical lymphadenopathy.    Lungs clear to auscultation.  Chest wall appears normal.    Heart with regular rhythm without murmur or gallop.    Breasts:         Right breast mildly tender in general, but especially in the lower half, but without dominant mass.  No nipple discharge.            No superficial skin changes.  Lumpectomy scar normal to palpation.  No axillary adenopathy.        Left total mastectomy scar without dominant mass.              No superficial skin changes.  No axillary adenopathy (S/P axillary node dissection).      Abdomen is flat, soft and nontender.No palpable mass.           No hepatosplenomegaly.  Flanks are negative.          Bowel sounds normal.  No abdominal bruit.          No inguinal lymphadenopathy bilaterally.     Pelvic exam :  Vulva normal.           External genitalia normal.  BUS negative.             Introitus and Vaginal mucosa atrophic.              Cervix atrophic, no Pap smear.  No cervical motion tenderness.          Uterus retroverted, normal size, normal shape.          Adnexa are negative bilaterally.  No tenderness.  No palpable mass.          Rectovaginal exam negative .  Stool heme negative.    Musc /Skel: Lower extremities without edema.     Neuro: Coordination is grossly normal.  Gait is normal.    Psych: Mood and affect is normal.  Behavior normal.  Thought content normal.            Judgment normal.       =All questions were answered.  White     Assessment/Plan   Yuli was seen today for gynecologic exam.    Diagnoses and all orders for this visit:    Well woman exam with routine gynecological exam  -     POC Urinalysis Dipstick, Automated    Fatigue, unspecified type  -     TSH  -     T4, Free  -     Comprehensive Metabolic Panel  -     CBC & Differential  -     proBNP    Left carotid bruit    Osteopenia of multiple sites  -      DEXA SCAN    Mastalgia in female  -     US Breast Right Complete    Other orders  -     Manual  Differential        COMMENTS:    Avni Rudolph MD  5/26/2018

## 2018-05-25 LAB
ALBUMIN SERPL-MCNC: 4.4 G/DL (ref 3.5–5.2)
ALBUMIN/GLOB SERPL: 2.4 G/DL
ALP SERPL-CCNC: 69 U/L (ref 39–117)
ALT SERPL-CCNC: 20 U/L (ref 1–33)
AST SERPL-CCNC: 18 U/L (ref 1–32)
BASOPHILS # BLD AUTO: 0.01 10*3/MM3 (ref 0–0.2)
BASOPHILS NFR BLD AUTO: 0.2 % (ref 0–1.5)
BILIRUB SERPL-MCNC: 0.3 MG/DL (ref 0.1–1.2)
BUN SERPL-MCNC: 18 MG/DL (ref 8–23)
BUN/CREAT SERPL: 25 (ref 7–25)
CALCIUM SERPL-MCNC: 9.2 MG/DL (ref 8.6–10.5)
CHLORIDE SERPL-SCNC: 101 MMOL/L (ref 98–107)
CO2 SERPL-SCNC: 26 MMOL/L (ref 22–29)
CREAT SERPL-MCNC: 0.72 MG/DL (ref 0.57–1)
DIFFERENTIAL COMMENT: NORMAL
EOSINOPHIL # BLD AUTO: 0.05 10*3/MM3 (ref 0–0.7)
EOSINOPHIL NFR BLD AUTO: 1.1 % (ref 0.3–6.2)
ERYTHROCYTE [DISTWIDTH] IN BLOOD BY AUTOMATED COUNT: 12.3 % (ref 11.7–13)
GFR SERPLBLD CREATININE-BSD FMLA CKD-EPI: 81 ML/MIN/1.73
GFR SERPLBLD CREATININE-BSD FMLA CKD-EPI: 99 ML/MIN/1.73
GLOBULIN SER CALC-MCNC: 1.8 GM/DL
GLUCOSE SERPL-MCNC: 90 MG/DL (ref 65–99)
HCT VFR BLD AUTO: 40.5 % (ref 35.6–45.5)
HGB BLD-MCNC: 13.1 G/DL (ref 11.9–15.5)
IMM GRANULOCYTES # BLD: 0 10*3/MM3 (ref 0–0.03)
IMM GRANULOCYTES NFR BLD: 0 % (ref 0–0.5)
LYMPHOCYTES # BLD AUTO: 1.62 10*3/MM3 (ref 0.9–4.8)
LYMPHOCYTES NFR BLD AUTO: 34 % (ref 19.6–45.3)
MCH RBC QN AUTO: 34.5 PG (ref 26.9–32)
MCHC RBC AUTO-ENTMCNC: 32.3 G/DL (ref 32.4–36.3)
MCV RBC AUTO: 106.6 FL (ref 80.5–98.2)
MONOCYTES # BLD AUTO: 0.46 10*3/MM3 (ref 0.2–1.2)
MONOCYTES NFR BLD AUTO: 9.7 % (ref 5–12)
NEUTROPHILS # BLD AUTO: 2.62 10*3/MM3 (ref 1.9–8.1)
NEUTROPHILS NFR BLD AUTO: 55 % (ref 42.7–76)
NT-PROBNP SERPL-MCNC: 69 PG/ML (ref 0–301)
PLATELET # BLD AUTO: 236 10*3/MM3 (ref 140–500)
PLATELET BLD QL SMEAR: NORMAL
POTASSIUM SERPL-SCNC: 4.1 MMOL/L (ref 3.5–5.2)
PROT SERPL-MCNC: 6.2 G/DL (ref 6–8.5)
RBC # BLD AUTO: 3.8 10*6/MM3 (ref 3.9–5.2)
RBC MORPH BLD: NORMAL
SODIUM SERPL-SCNC: 141 MMOL/L (ref 136–145)
T4 FREE SERPL-MCNC: 1.21 NG/DL (ref 0.93–1.7)
TSH SERPL DL<=0.005 MIU/L-ACNC: 2.62 MIU/ML (ref 0.27–4.2)
WBC # BLD AUTO: 4.76 10*3/MM3 (ref 4.5–10.7)

## 2018-06-03 DIAGNOSIS — Z87.39 PERSONAL HISTORY OF OSTEOPOROSIS: Primary | ICD-10-CM

## 2018-06-03 RX ORDER — ALENDRONATE SODIUM 70 MG/1
70 TABLET ORAL
Qty: 4 TABLET | Refills: 12 | Status: SHIPPED | OUTPATIENT
Start: 2018-06-03 | End: 2019-06-24 | Stop reason: SDUPTHER

## 2018-06-05 ENCOUNTER — APPOINTMENT (OUTPATIENT)
Dept: WOMENS IMAGING | Facility: HOSPITAL | Age: 68
End: 2018-06-05

## 2018-06-05 PROCEDURE — 76641 ULTRASOUND BREAST COMPLETE: CPT | Performed by: RADIOLOGY

## 2018-06-05 PROCEDURE — MDREVSPNEW: Performed by: RADIOLOGY

## 2018-06-11 ENCOUNTER — OFFICE VISIT (OUTPATIENT)
Dept: ORTHOPEDIC SURGERY | Facility: CLINIC | Age: 68
End: 2018-06-11

## 2018-06-11 VITALS — BODY MASS INDEX: 30.49 KG/M2 | TEMPERATURE: 97.5 F | HEIGHT: 65 IN | WEIGHT: 183 LBS

## 2018-06-11 DIAGNOSIS — G89.29 CHRONIC PAIN OF LEFT ANKLE: Primary | ICD-10-CM

## 2018-06-11 DIAGNOSIS — M19.079 ARTHRITIS OF ANKLE: ICD-10-CM

## 2018-06-11 DIAGNOSIS — M25.572 CHRONIC PAIN OF LEFT ANKLE: Primary | ICD-10-CM

## 2018-06-11 PROCEDURE — 99204 OFFICE O/P NEW MOD 45 MIN: CPT | Performed by: ORTHOPAEDIC SURGERY

## 2018-06-11 PROCEDURE — 73610 X-RAY EXAM OF ANKLE: CPT | Performed by: ORTHOPAEDIC SURGERY

## 2018-06-11 RX ORDER — ACETAMINOPHEN 500 MG
500 TABLET ORAL EVERY 6 HOURS PRN
COMMUNITY
End: 2019-12-11 | Stop reason: SDDI

## 2018-06-11 RX ORDER — ASPIRIN 81 MG/1
81 TABLET ORAL DAILY
COMMUNITY
End: 2022-10-25 | Stop reason: HOSPADM
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
## 2018-06-14 ENCOUNTER — OFFICE VISIT (OUTPATIENT)
Dept: RADIATION ONCOLOGY | Facility: HOSPITAL | Age: 68
End: 2018-06-14

## 2018-06-14 VITALS
DIASTOLIC BLOOD PRESSURE: 74 MMHG | RESPIRATION RATE: 16 BRPM | OXYGEN SATURATION: 94 % | HEIGHT: 65 IN | HEART RATE: 95 BPM | BODY MASS INDEX: 30.49 KG/M2 | WEIGHT: 183 LBS | TEMPERATURE: 97.5 F | SYSTOLIC BLOOD PRESSURE: 154 MMHG

## 2018-06-14 DIAGNOSIS — C50.411 MALIGNANT NEOPLASM OF UPPER-OUTER QUADRANT OF RIGHT FEMALE BREAST, UNSPECIFIED ESTROGEN RECEPTOR STATUS (HCC): Primary | ICD-10-CM

## 2018-06-14 DIAGNOSIS — C50.212 MALIGNANT NEOPLASM OF UPPER-INNER QUADRANT OF LEFT FEMALE BREAST, UNSPECIFIED ESTROGEN RECEPTOR STATUS (HCC): ICD-10-CM

## 2018-06-14 PROCEDURE — 99213 OFFICE O/P EST LOW 20 MIN: CPT | Performed by: RADIOLOGY

## 2018-06-14 NOTE — PROGRESS NOTES
Subjective     No ref. provider found     Diagnosis Plan   1. Malignant neoplasm of upper-outer quadrant of right female breast, unspecified estrogen receptor status     2. Malignant neoplasm of upper-inner quadrant of left female breast, unspecified estrogen receptor status       Chief Complaint   Patient presents with   • Follow-up     s/p xrt 3/7/17                                   CC: 15 month follow up for stage III left breast cancer                              S:  I had the [facility] of [facility]. She returns today for follow up now 15  months out from completion of her radiation therapy to the left chest wall and regional nodes for stage III left breast cancer. She completed treatments on 3/7/17. She received a dose of 5040cGy followed by a 10Gy scar boost. She has been doing well since I last saw her. She states her skin is returning to normal and she denies any lymphedema of the left arm. She had a right breast mammogram and ultrasound on 4/17/17 which was benign and routine follow up was recommended.  She had a CT of the neck on 4/17/17 due to some fullness in the left supraclavicular fossa and this was negative for any suspicious findings as well.  She has been doing very well and saw Dr. Bonilla with CBC in August.  She had an ultrasound of the right breast on 6/5/18 for pain and this was negative.  Follow up mammogram recommended in 6 months. Right breast mammogram on 4/25/18 was negative.          Review of Systems   Constitutional: Positive for fatigue. Negative for unexpected weight change.   Respiratory: Negative.    Musculoskeletal:        Tenderness right breast         Past Medical History:   Diagnosis Date   • Atypical squamous cell changes of undetermined significance (ASCUS) on cervical cytology with negative high risk human papilloma virus (HPV) test result 1995 and 2006    1995: LGSIL on Pap, HPV effect.  All subsequent Paps were neg until 2006, Pap  with ASCUS.  Neg HR-HPV.  All subsequent Paps have been normal.  2012, '13, '17 neg paps, & clinic neg HR-HPV.   • Breast cancer 2013    Right Breast: Stage IA, invasive mammary cancer associated with DCIS.  Status post right lumpectomy with radiation.  Declined Arimidex therapy.   • Breast cancer 04/07/2016    Left, stage IIIC: S/P LEFT BREAST MASTECTOMY WITH LEFT AXILLARY NODE DISSECTION;  Surgeon: Denis Cortes MD;  Location: [facility] OR   • Bruit of left carotid artery 2017    [place] a faint  left carotid bruit on annual exam, asymptomatic.  Carotid Doppler detected mild obstruction, but no plaque.  Left carotid artery is tortuous.  Right side is negative.   • Concussion    • Depression    • Diverticulosis of sigmoid colon 2008    Colonoscopy by Dr. Tung Cortes: Mild sigmoid diverticulosis.  No masses or polyps.   • DUB (dysfunctional uterine bleeding) 1996    Dysfunctional uterine bleeding.  Probable anovulatory cycle.  Endometrial biopsy with proliferative endometrium.   • Fever blister    • Glaucoma    • History of migraine    • History of postmenopausal HRT     Post menopause hormones for 4 years   • History of radiation therapy 07/10/2013    07/10/2013 - 08/23/2013  right breast, 1/2017-3/2017 left breast   • Hyperlipidemia    • Hypertriglyceridemia    • Incontinence of urine    • LGSIL of cervix of undetermined significance 1995    Only on PAP SMEAR, HPV EFFECT, NOT TRUE MILD DYSPLASIA.   • Menopause 2002    Took HRT for about 4 years.   • Nonocclusive coronary atherosclerosis of native coronary artery     2014: 10-20% LI    • NSVT (nonsustained ventricular tachycardia)     RVOT tachycardia   • Osteoarthritis    • Osteopenia 5/22/2017   • Peripheral neuropathy due to chemotherapy 8/15/2016   • PMB (postmenopausal bleeding) 2005    Postmenopausal bleeding on Prempro 0.625/2.5.  Endometrial biopsy showed scant endometrium with hyperplastic polyp.    • PONV (postoperative nausea and vomiting)    •  "Scarlet fever    • Status post chemotherapy 2017 - 2016 -  4 drugs -Herceptin, perjeta, carboplatin, taxotere.  Then continued every three weeks with Herceptin until 2017.    • Urinary frequency    • Vertigo    • VPC (ventricular premature complex)          Past Surgical History:   Procedure Laterality Date   • BACK SURGERY      Discectomy   • BREAST BIOPSY      Stereotactic biopsey of right breast   • BREAST LUMPECTOMY Right 2013   • BREAST LUMPECTOMY WITH SENTINEL NODE BIOPSY AND AXILLARY NODE DISSECTION Left 10/5/2016    Procedure: LT BREAST LUMPECTOMY WITH MAMMO NEEDLE LOC W/ SENTINEL NODE BIOPSY AND POSS AXILLARY NODE DISSECTION;  Surgeon: Denis Cortes MD;  Location: [facility];  Service:    • BREAST SURGERY  2013    Lumpectomy   •  SECTION      baby boy \"RAPHAEL\"   • CLOSED REDUCTION TIBIAL SHAFT FRACTURE     • COLONOSCOPY     • EYE SURGERY     • LUMBAR FUSION     • MASTECTOMY Left 2016    Procedure: LEFT BREAST MASTECTOMY WITH LEFT AXILLARY NODE DISSECTION;  Surgeon: Denis Cortes MD;  Location: [facility] OR;  Service:    • OTHER SURGICAL HISTORY      cardiac cath   • POSTPARTUM TUBAL LIGATION     • SENTINEL LYMPH NODE BIOPSY           Social History     Social History   • Marital status:    • Number of children: 1   • Years of education: 13     Occupational History   •  [employer]   •  Retired     Social History Main Topics   • Smoking status: Former Smoker     Packs/day: 2.00     Start date:      Quit date:    • Smokeless tobacco: Never Used      Comment: Quit for 10 years.  2 packs / day for 30 years (60 pack years).   • Alcohol use 0.6 oz/week     1 Glasses of wine per week      Comment: caffeine use: ONE CUP DAILY / WINE 1-2 X PER MONTH.    • Drug use: No   • Sexual activity: Defer     Other Topics Concern   • Not on file         Family History   Problem Relation Age of Onset   • " Heart disease Father    • Abnormal EKG Father    • Heart attack Father 55   • Breast cancer Mother 81   • Diabetes type II Mother    • Breast cancer Sister 68        BRCA NEGATIVE    • No Known Problems Maternal Grandmother    • No Known Problems Maternal Grandfather    • No Known Problems Paternal Grandmother    • No Known Problems Paternal Grandfather    • Heart attack Paternal Uncle 54   • Heart attack Paternal Uncle 54   • No Known Problems Sister    • Celiac disease Sister    • No Known Problems Son         DEJON           Objective    Physical Exam   Constitutional: She appears well-developed and well-nourished.   Pulmonary/Chest:       Slightly tender to palaption, no palpable masses or adneoapthy, no nodules laong left chest wall         Current Outpatient Prescriptions on File Prior to Visit   Medication Sig Dispense Refill   • acetaminophen (TYLENOL) 500 MG tablet Take 500 mg by mouth Every 6 (Six) Hours As Needed for Mild Pain .     • alendronate (FOSAMAX) 70 MG tablet Take 1 tablet by mouth Every 7 (Seven) Days. 4 tablet 12   • aspirin 81 MG EC tablet Take 81 mg by mouth Daily.     • atorvastatin (LIPITOR) 40 MG tablet Take 40 mg by mouth Every Evening.     • bimatoprost (LUMIGAN) 0.01 % ophthalmic drops      • Calcium Carbonate (CALTRATE 600 PO) Take 1 tablet by mouth Every Morning.     • diphenoxylate-atropine (LOMOTIL) 2.5-0.025 MG per tablet Take 2 tablets by mouth 4 (Four) Times a Day As Needed for diarrhea.     • famciclovir (FAMVIR) 500 MG tablet Take 500 mg by mouth 3 (Three) Times a Day As Needed (cold sore).     • folic acid (FOLVITE) 400 MCG tablet Take 400 mcg by mouth Every Morning.     • hyoscyamine (ANASPAZ,LEVSIN) 0.125 MG tablet Take 0.125 mg by mouth Every 4 (Four) Hours As Needed (overactive bladder).     • meclizine (ANTIVERT) 25 MG tablet Take 25 mg by mouth 3 (Three) Times a Day As Needed.     • metoprolol succinate XL (TOPROL-XL) 25 MG 24 hr tablet TAKE ONE-HALF TABLET BY MOUTH  DAILY 45 tablet 0   • Multiple Vitamins-Minerals (MULTIVITAL PO) Take 1 tablet/day by mouth Every Morning.     • saccharomyces boulardii (FLORASTOR) 250 MG capsule Take 250 mg by mouth 2 (Two) Times a Day.       No current facility-administered medications on file prior to visit.        ALLERGIES:    Allergies   Allergen Reactions   • Nitrofurantoin Anaphylaxis   • Celecoxib Nausea Only     See phone message dated 3/2/2018   • Amoxicillin Diarrhea and Rash       LMP  (LMP Unknown)      Current Status 1/23/2018   ECOG score 0         Assessment/Plan   67 year old female with remote hx of stage I right breast cancer and recent hx of stage III left breast cancer, now 15 months out from radiation to left chest wall and regional nodes.  She has some tenderness of right breast but had a right breast ultrasound on 6/5/18 which was negative.  She will be due for her next mammogram in October 2018 due to follow up 6 month mammo recommended.  She has routine follow up with Dr. Bonilla again in February 2018 and will continue routine follow up with him as well.  I will see her back in one year or sooner if necessary.               Thank you very much for allowing me to participate in the care of this very pleasant patient.    Sincerely,      Erika Osborn MD

## 2018-07-11 ENCOUNTER — LAB (OUTPATIENT)
Dept: LAB | Facility: HOSPITAL | Age: 68
End: 2018-07-11

## 2018-07-11 ENCOUNTER — OFFICE VISIT (OUTPATIENT)
Dept: ONCOLOGY | Facility: CLINIC | Age: 68
End: 2018-07-11

## 2018-07-11 VITALS
SYSTOLIC BLOOD PRESSURE: 158 MMHG | TEMPERATURE: 98.3 F | HEART RATE: 87 BPM | BODY MASS INDEX: 31.12 KG/M2 | OXYGEN SATURATION: 96 % | DIASTOLIC BLOOD PRESSURE: 80 MMHG | RESPIRATION RATE: 16 BRPM | WEIGHT: 186.8 LBS | HEIGHT: 65 IN

## 2018-07-11 DIAGNOSIS — Z85.3 HISTORY OF BREAST CANCER: ICD-10-CM

## 2018-07-11 DIAGNOSIS — Z85.3 HISTORY OF BREAST CANCER: Primary | ICD-10-CM

## 2018-07-11 LAB
BASOPHILS # BLD AUTO: 0.04 10*3/MM3 (ref 0–0.1)
BASOPHILS NFR BLD AUTO: 0.6 % (ref 0–1.1)
DEPRECATED RDW RBC AUTO: 42.2 FL (ref 37–49)
EOSINOPHIL # BLD AUTO: 0.06 10*3/MM3 (ref 0–0.36)
EOSINOPHIL NFR BLD AUTO: 0.9 % (ref 1–5)
ERYTHROCYTE [DISTWIDTH] IN BLOOD BY AUTOMATED COUNT: 11.4 % (ref 11.7–14.5)
HCT VFR BLD AUTO: 39.1 % (ref 34–45)
HGB BLD-MCNC: 13.8 G/DL (ref 11.5–14.9)
IMM GRANULOCYTES # BLD: 0.06 10*3/MM3 (ref 0–0.03)
IMM GRANULOCYTES NFR BLD: 0.9 % (ref 0–0.5)
LYMPHOCYTES # BLD AUTO: 1.67 10*3/MM3 (ref 1–3.5)
LYMPHOCYTES NFR BLD AUTO: 25.1 % (ref 20–49)
MCH RBC QN AUTO: 35.6 PG (ref 27–33)
MCHC RBC AUTO-ENTMCNC: 35.3 G/DL (ref 32–35)
MCV RBC AUTO: 100.8 FL (ref 83–97)
MONOCYTES # BLD AUTO: 0.54 10*3/MM3 (ref 0.25–0.8)
MONOCYTES NFR BLD AUTO: 8.1 % (ref 4–12)
NEUTROPHILS # BLD AUTO: 4.28 10*3/MM3 (ref 1.5–7)
NEUTROPHILS NFR BLD AUTO: 64.4 % (ref 39–75)
NRBC BLD MANUAL-RTO: 0 /100 WBC (ref 0–0)
PLATELET # BLD AUTO: 233 10*3/MM3 (ref 150–375)
PMV BLD AUTO: 9.3 FL (ref 8.9–12.1)
RBC # BLD AUTO: 3.88 10*6/MM3 (ref 3.9–5)
WBC NRBC COR # BLD: 6.65 10*3/MM3 (ref 4–10)

## 2018-07-11 PROCEDURE — 36415 COLL VENOUS BLD VENIPUNCTURE: CPT | Performed by: INTERNAL MEDICINE

## 2018-07-11 PROCEDURE — 99213 OFFICE O/P EST LOW 20 MIN: CPT | Performed by: INTERNAL MEDICINE

## 2018-07-11 PROCEDURE — 85025 COMPLETE CBC W/AUTO DIFF WBC: CPT | Performed by: INTERNAL MEDICINE

## 2018-07-11 RX ORDER — DICLOFENAC SODIUM 75 MG/1
75 TABLET, DELAYED RELEASE ORAL 2 TIMES DAILY
COMMUNITY
Start: 2018-06-26 | End: 2022-10-25 | Stop reason: HOSPADM

## 2018-07-11 NOTE — PROGRESS NOTES
[facility] OUTPATIENT CLINIC FOLLOW UP VISIT    REASON FOR FOLLOW-UP:      Malignant neoplasm of upper-inner quadrant of left female breast    Staging form: Breast, AJCC V7      Clinical stage from 4/14/2016: Stage IIIC (T3, N3b, M0) - Signed by Jordon Bonilla MD on 4/28/2016    Malignant neoplasm of upper-outer quadrant of right female breast    Staging form: Breast, AJCC V7      Clinical stage from 3/25/2016: Stage IA (rT1a, N0, M0) - Signed by Erika Osborn MD on 3/25/2016    HISTORY OF PRESENT ILLNESS:  Yuli Huang is a 68 y.o. female who returns today for follow up of the above issue.      She has been having some left ankle pain.  She sought 2 orthopedic surgeons for this and ultimately received an injection into the left ankle which has helped her pain significantly.  She continues to have some right breast tenderness.  She had an ultrasound which was unremarkable and a mammogram in 6 months was recommended.  Otherwise energy level is reasonable.  She continues to feel well otherwise.  Numbness in her hands and feet persists but she denies any neuropathic pain.      ONCOLOGIC HISTORY:     Malignant neoplasm of upper-outer quadrant of right female breast (CMS/HCC)    5/13/2013 Initial Diagnosis            5/13/2013 Biopsy     Biopsy:  Ductal carcinoma in situ.  ER 20%, DE 1-4%.  Her2 not done.         6/4/2013 Surgery     Lumpectomy by Dr. Denis Cortes.  2mm invasive cancer associated with DCIS.  ER/DE negative on the DCIS; unable to be performed on the invasive component.           6/18/2013 Surgery     [place] lymph node biopsy:  3 nodes negative.         7/10/2013 - 8/23/2013 Radiation     Radiation therapy to the right breast.           Malignant neoplasm of upper-inner quadrant of left female breast (CMS/HCC)    3/31/2016 Imaging     Ultrasound left breast:  10 o'clock position, 3x2cm mass with a 2cm axillary lymph node.         4/7/2016 Biopsy     Ultrasound-guided biopsy of the  left breast and axillary lymph node.  ER/IA negative, HER-2 overexpressed.  Grade 2.           4/19/2016 Imaging     PET scan:  Left breast mass demonstrate significant metabolic activity.  There is a single 1.7 cm lymph node at the left axilla which  demonstrates significant metabolic activity for its size. There is also  a subcentimeter node in the left internal mammary chain which  nonetheless demonstrates discernible activity. I suspect an early erasto  metastasis. No further evidence for metastatic disease is present.         4/21/2016 Imaging     Breast MRI:  1. Biopsy-proven malignancy in the left breast extending from the 8:30  o'clock through 1 o'clock positions and measuring up to 7.6 cm in  greatest dimension. Adjacent satellite clumped foci of enhancement are  also noted in the upper outer and lower outer quadrants. Left axillary  adenopathy is also appreciated.  2. There are no findings suspicious for malignancy in the right breast.  Postbreast conservation therapy changes of the right breast are noted.         4/29/2016 - 8/15/2016 Chemotherapy     Neoadjuvant TCHP         9/8/2016 Imaging     MRI breasts:  1. Findings consistent with significant interval response to neoadjuvant  chemotherapy in the left breast. However, there remains some scattered  stippled foci of enhancement that are asymmetric in the left breast  compared to the right breast and they are from the 8 o'clock through 12  o'clock positions in distribution. This corresponds to a region that was  previously occupied by considerable neoplastic disease/malignancy, thus  microscopic multifocal disease is suspected. There has been resolution  of left axillary adenopathy.  2. There are no findings suspicious for malignancy in the right breast.         10/5/2016 Surgery     Lumpectomy with sentinel lymph node biopsy:  Breast:  DCIS spanning 3.3cm.  ER/IA negative.  Multifocal.  No invasive carcinoma  1 of 2 sentinel nodes positive for  carcinoma measuring 1.1mm without extracapsular extension.           11/16/2016 Surgery     Left modified radical mastectomy with axillary lymph node sampling by Dr. Cortes.  High grade DCIS with margins <1mm.  10 benign axillary lymph nodes.         1/9/2017 Imaging     PET scan:  IMPRESSION:  The low-level activity at the left axillary surgical bed may  represent residual postoperative change. Residual malignancy in this  region cannot be entirely excluded. There is otherwise no abnormal  hypermetabolic activity or convincing evidence for metastatic disease.         1/18/2017 - 3/7/2017 Radiation             - 4/21/2017 Chemotherapy     OP BREAST Trastuzumab Q21D (maintenance)                 PAST MEDICAL, SURGICAL, FAMILY, AND SOCIAL HISTORIES WERE REVIEWED WITH THE PATIENT AND IN THE ELECTRONIC MEDICAL RECORD, AND WERE UPDATED IF INDICATED.    ALLERGIES:  Allergies   Allergen Reactions   • Nitrofurantoin Anaphylaxis   • Celecoxib Nausea Only     See phone message dated 3/2/2018   • Amoxicillin Diarrhea and Rash       MEDICATIONS:  The medication list has been reviewed with the patient by the medical assistant, and the list has been updated in the electronic medical record, which I reviewed.  Medication dosages and frequencies were confirmed to be accurate.    REVIEW OF SYSTEMS:  PAIN:  See Vital Signs below.  GENERAL:  No fevers, chills, night sweats, or unintended weight loss.  SKIN:  Hair is regrowing nicely on the scalp.  HEME/LYMPH:  No abnormal bleeding.  No palpable lymphadenopathy.  EYES:  No vision changes or diplopia.  ENT:  No mucositis or ulcers  RESPIRATORY:  No cough, shortness of breath, hemoptysis, or wheezing.  CARDIOVASCULAR:  No chest pain, palpitations, orthopnea, or dyspnea on exertion.  GASTROINTESTINAL: She did not complain of any nausea vomiting diarrhea or constipation today.  GENITOURINARY:  No dysuria or hematuria.  MUSCULOSKELETAL:  Left ankle pain has improved after an injection.   "Right breast tenderness.  NEUROLOGIC:  Numbness in her hands and feet  PSYCHIATRIC:  Anxiety improved.    Vitals:    07/11/18 1507   BP: 158/80   Pulse: 87   Resp: 16   Temp: 98.3 °F (36.8 °C)   TempSrc: Oral   SpO2: 96%   Weight: 84.7 kg (186 lb 12.8 oz)   Height: 165 cm (64.96\")  Comment: new height   PainSc: 0-No pain       PHYSICAL EXAMINATION:  GENERAL:  Well-developed well-nourished female; awake, alert and oriented, in no acute distress.  SKIN: Significant erythema with areas of crusting on the left chest from radiation  HEAD:  Normocephalic, atraumatic. Alopecia   EYES:  Pupils equal, round and reactive to light.  Extraocular movements intact.  Conjunctivae normal.  EARS:  Hearing intact.  NOSE:  Septum midline.  No excoriations or nasal discharge.  MOUTH:  No stomatitis or ulcers.  Lips are normal.  THROAT:  Oropharynx without lesions or exudates.  NECK:  Supple with good range of motion; no thyromegaly or masses; no JVD or bruits.  LYMPHATICS:  No cervical, supraclavicular, axillary, or inguinal lymphadenopathy.  CHEST:  Lungs are clear to auscultation bilaterally.  No wheezes, rales, or rhonchi.  A Mediport is present and is benign in appearance in the right subclavian area.  BREASTS: I examined the left chest wall today.  No nodules or skin changes.  Healed surgical incision in the upper outer quadrant of the right breast with some mild tenderness in this area.  No axillary adenopathy.  The right breast is diffusely tender to palpation without discrete nodules or nipple discharge.  Healed surgical incision present.  HEART:  Regular rate; normal rhythm.  No murmurs, gallops or rubs.  ABDOMEN:  Soft, non-tender, non-distended.  Normal active bowel sounds.  No organomegaly  EXTREMITIES:  No clubbing, cyanosis, or edema.  NEUROLOGICAL:  No focal neurologic deficits.  BREASTS:  Not examined today.    DIAGNOSTIC DATA:  Lab Results   Component Value Date    WBC 6.65 07/11/2018    HGB 13.8 07/11/2018    HCT 39.1 " 07/11/2018    .8 (H) 07/11/2018     07/11/2018       Lab Results   Component Value Date    GLUCOSE 137 (H) 11/09/2016    BUN 18 05/24/2018    CREATININE 0.72 05/24/2018    EGFRIFNONA 81 05/24/2018    EGFRIFAFRI 99 05/24/2018    BCR 25.0 05/24/2018    CO2 26.0 05/24/2018    CALCIUM 9.2 05/24/2018    PROTENTOTREF 6.2 05/24/2018    ALBUMIN 4.40 05/24/2018    LABIL2 2.4 05/24/2018    AST 18 05/24/2018    ALT 20 05/24/2018     Lab Results   Component Value Date    GLUCOSE 137 (H) 11/09/2016    CALCIUM 9.2 05/24/2018     05/24/2018    K 4.1 05/24/2018    CO2 26.0 05/24/2018     05/24/2018    BUN 18 05/24/2018    CREATININE 0.72 05/24/2018    EGFRIFAFRI 99 05/24/2018    EGFRIFNONA 81 05/24/2018    BCR 25.0 05/24/2018    ANIONGAP 13.1 11/09/2016     Imaging: Right breast ultrasound on 6/5/2018 read as BI-RADS Category 2.  Mammography in 6 months recommended.    ASSESSMENT:  This is a 68 y.o. female with:  1.  History of stage I carcinoma of the right breast.  The biopsy initially showed DCIS.  There was a tiny invasive component on the surgical specimen.  This was minimally hormone receptor positive and HER-2 was not able to be tested.  She had radiation following her right breast lumpectomy but no medical therapy.  2.  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  She completed 6 cycles of neoadjuvant chemotherapy with TCH P followed by a lumpectomy on 10/5/2016.  No residual invasive carcinoma in the breast but there was extensive DCIS with very close margins.  One lymph node was positive for metastatic carcinoma.  We discussed her case at the multidisciplinary breast conference.  She had a mammogram that showed some persistent suspicious calcifications and therefore on 11/16/2016 had a left modified radical mastectomy with axillary lymph node sampling.  High-grade DCIS was present but no invasive carcinoma and no  lymph nodes were positive.  Margins were very close.  She had a PET scan that was negative.  She completed radiation therapy.  She completed Herceptin on 4/21/2017.  3.  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  She continues a vitamin B complex vitamin.  Stable  4.  Persistent right breast pain: Likely an adverse effect of surgery and radiation.    PLAN:   1.  Continue a vitamin B complex vitamin  2.  Right screening mammogram in April.    3.  I will see her back in 6 months for follow-up with a breast exam and a CBC.

## 2018-10-18 ENCOUNTER — TELEPHONE (OUTPATIENT)
Dept: ONCOLOGY | Facility: HOSPITAL | Age: 68
End: 2018-10-18

## 2018-10-18 NOTE — TELEPHONE ENCOUNTER
Ok per Mey Berrios NP to use Rogaine.     ----- Message from Ayah Mendenhall sent at 10/18/2018  2:07 PM EDT -----  Contact: [phone]  Pt calling to see if it is ok for her to use Rogaine.

## 2018-11-02 ENCOUNTER — APPOINTMENT (OUTPATIENT)
Dept: PHYSICAL THERAPY | Facility: HOSPITAL | Age: 68
End: 2018-11-02

## 2018-12-13 ENCOUNTER — OFFICE VISIT (OUTPATIENT)
Dept: ONCOLOGY | Facility: CLINIC | Age: 68
End: 2018-12-13

## 2018-12-13 ENCOUNTER — LAB (OUTPATIENT)
Dept: LAB | Facility: HOSPITAL | Age: 68
End: 2018-12-13

## 2018-12-13 VITALS
DIASTOLIC BLOOD PRESSURE: 62 MMHG | RESPIRATION RATE: 18 BRPM | BODY MASS INDEX: 29.81 KG/M2 | SYSTOLIC BLOOD PRESSURE: 131 MMHG | HEIGHT: 65 IN | TEMPERATURE: 98.5 F | WEIGHT: 178.9 LBS | HEART RATE: 69 BPM | OXYGEN SATURATION: 95 %

## 2018-12-13 DIAGNOSIS — Z85.3 HISTORY OF BREAST CANCER: ICD-10-CM

## 2018-12-13 DIAGNOSIS — Z85.3 HISTORY OF BREAST CANCER: Primary | ICD-10-CM

## 2018-12-13 DIAGNOSIS — D75.89 MACROCYTOSIS WITHOUT ANEMIA: ICD-10-CM

## 2018-12-13 LAB
BASOPHILS # BLD AUTO: 0.04 10*3/MM3 (ref 0–0.1)
BASOPHILS NFR BLD AUTO: 0.7 % (ref 0–1.1)
DEPRECATED RDW RBC AUTO: 43 FL (ref 37–49)
EOSINOPHIL # BLD AUTO: 0.09 10*3/MM3 (ref 0–0.36)
EOSINOPHIL NFR BLD AUTO: 1.6 % (ref 1–5)
ERYTHROCYTE [DISTWIDTH] IN BLOOD BY AUTOMATED COUNT: 11.5 % (ref 11.7–14.5)
HCT VFR BLD AUTO: 40.3 % (ref 34–45)
HGB BLD-MCNC: 13.9 G/DL (ref 11.5–14.9)
IMM GRANULOCYTES # BLD: 0.03 10*3/MM3 (ref 0–0.03)
IMM GRANULOCYTES NFR BLD: 0.5 % (ref 0–0.5)
LYMPHOCYTES # BLD AUTO: 1.73 10*3/MM3 (ref 1–3.5)
LYMPHOCYTES NFR BLD AUTO: 30.8 % (ref 20–49)
MCH RBC QN AUTO: 35 PG (ref 27–33)
MCHC RBC AUTO-ENTMCNC: 34.5 G/DL (ref 32–35)
MCV RBC AUTO: 101.5 FL (ref 83–97)
MONOCYTES # BLD AUTO: 0.54 10*3/MM3 (ref 0.25–0.8)
MONOCYTES NFR BLD AUTO: 9.6 % (ref 4–12)
NEUTROPHILS # BLD AUTO: 3.19 10*3/MM3 (ref 1.5–7)
NEUTROPHILS NFR BLD AUTO: 56.8 % (ref 39–75)
NRBC BLD MANUAL-RTO: 0 /100 WBC (ref 0–0)
PLATELET # BLD AUTO: 266 10*3/MM3 (ref 150–375)
PMV BLD AUTO: 9.3 FL (ref 8.9–12.1)
RBC # BLD AUTO: 3.97 10*6/MM3 (ref 3.9–5)
WBC NRBC COR # BLD: 5.62 10*3/MM3 (ref 4–10)

## 2018-12-13 PROCEDURE — 85025 COMPLETE CBC W/AUTO DIFF WBC: CPT | Performed by: INTERNAL MEDICINE

## 2018-12-13 PROCEDURE — 99214 OFFICE O/P EST MOD 30 MIN: CPT | Performed by: INTERNAL MEDICINE

## 2018-12-13 PROCEDURE — 36416 COLLJ CAPILLARY BLOOD SPEC: CPT | Performed by: INTERNAL MEDICINE

## 2018-12-13 NOTE — PROGRESS NOTES
[facility] OUTPATIENT CLINIC FOLLOW UP VISIT    REASON FOR FOLLOW-UP:      Malignant neoplasm of upper-inner quadrant of left female breast    Staging form: Breast, AJCC V7      Clinical stage from 4/14/2016: Stage IIIC (T3, N3b, M0) - Signed by Jordon Bonilla MD on 4/28/2016    Malignant neoplasm of upper-outer quadrant of right female breast    Staging form: Breast, AJCC V7      Clinical stage from 3/25/2016: Stage IA (rT1a, N0, M0) - Signed by Erika Osborn MD on 3/25/2016    HISTORY OF PRESENT ILLNESS:  Yuli Huang is a 68 y.o. female who returns today for follow up of the above issue.     She continues to feel about the same.  She is using Rogaine on her scalp as hair did not grow in as thick as she would like.  She continues to have ankle pain and is receiving injections in her ankle which are a little bit less effective with each injection.  Peripheral neuropathy with numbness is very mild and stable and she really does not notice it.  She continues with breast and chest examinations and has noted no new abnormalities.  She has some mild fatigue but otherwise her energy level is good.      ONCOLOGIC HISTORY:     Malignant neoplasm of upper-outer quadrant of right female breast (CMS/HCC)    5/13/2013 Initial Diagnosis            5/13/2013 Biopsy     Biopsy:  Ductal carcinoma in situ.  ER 20%, UT 1-4%.  Her2 not done.         6/4/2013 Surgery     Lumpectomy by Dr. Denis Cortes.  2mm invasive cancer associated with DCIS.  ER/UT negative on the DCIS; unable to be performed on the invasive component.           6/18/2013 Surgery     [place] lymph node biopsy:  3 nodes negative.         7/10/2013 - 8/23/2013 Radiation     Radiation therapy to the right breast.           Malignant neoplasm of upper-inner quadrant of left female breast (CMS/HCC)    3/31/2016 Imaging     Ultrasound left breast:  10 o'clock position, 3x2cm mass with a 2cm axillary lymph node.         4/7/2016 Biopsy      Ultrasound-guided biopsy of the left breast and axillary lymph node.  ER/NJ negative, HER-2 overexpressed.  Grade 2.           4/19/2016 Imaging     PET scan:  Left breast mass demonstrate significant metabolic activity.  There is a single 1.7 cm lymph node at the left axilla which  demonstrates significant metabolic activity for its size. There is also  a subcentimeter node in the left internal mammary chain which  nonetheless demonstrates discernible activity. I suspect an early erasto  metastasis. No further evidence for metastatic disease is present.         4/21/2016 Imaging     Breast MRI:  1. Biopsy-proven malignancy in the left breast extending from the 8:30  o'clock through 1 o'clock positions and measuring up to 7.6 cm in  greatest dimension. Adjacent satellite clumped foci of enhancement are  also noted in the upper outer and lower outer quadrants. Left axillary  adenopathy is also appreciated.  2. There are no findings suspicious for malignancy in the right breast.  Postbreast conservation therapy changes of the right breast are noted.         4/29/2016 - 8/15/2016 Chemotherapy     Neoadjuvant TCHP         9/8/2016 Imaging     MRI breasts:  1. Findings consistent with significant interval response to neoadjuvant  chemotherapy in the left breast. However, there remains some scattered  stippled foci of enhancement that are asymmetric in the left breast  compared to the right breast and they are from the 8 o'clock through 12  o'clock positions in distribution. This corresponds to a region that was  previously occupied by considerable neoplastic disease/malignancy, thus  microscopic multifocal disease is suspected. There has been resolution  of left axillary adenopathy.  2. There are no findings suspicious for malignancy in the right breast.         10/5/2016 Surgery     Lumpectomy with sentinel lymph node biopsy:  Breast:  DCIS spanning 3.3cm.  ER/NJ negative.  Multifocal.  No invasive carcinoma  1 of 2  sentinel nodes positive for carcinoma measuring 1.1mm without extracapsular extension.           11/16/2016 Surgery     Left modified radical mastectomy with axillary lymph node sampling by Dr. Cortes.  High grade DCIS with margins <1mm.  10 benign axillary lymph nodes.         1/9/2017 Imaging     PET scan:  IMPRESSION:  The low-level activity at the left axillary surgical bed may  represent residual postoperative change. Residual malignancy in this  region cannot be entirely excluded. There is otherwise no abnormal  hypermetabolic activity or convincing evidence for metastatic disease.         1/18/2017 - 3/7/2017 Radiation             - 4/21/2017 Chemotherapy     OP BREAST Trastuzumab Q21D (maintenance)                 PAST MEDICAL, SURGICAL, FAMILY, AND SOCIAL HISTORIES WERE REVIEWED WITH THE PATIENT AND IN THE ELECTRONIC MEDICAL RECORD, AND WERE UPDATED IF INDICATED.    ALLERGIES:  Allergies   Allergen Reactions   • Nitrofurantoin Anaphylaxis   • Celecoxib Nausea Only     See phone message dated 3/2/2018   • Amoxicillin Diarrhea and Rash       MEDICATIONS:  The medication list has been reviewed with the patient by the medical assistant, and the list has been updated in the electronic medical record, which I reviewed.  Medication dosages and frequencies were confirmed to be accurate.    REVIEW OF SYSTEMS:  PAIN:  See Vital Signs below.  GENERAL:  No fevers, chills, night sweats, or unintended weight loss.  SKIN:  Some alopecia on her scalp  HEME/LYMPH:  No abnormal bleeding.  No palpable lymphadenopathy.  EYES:  No vision changes or diplopia.  ENT:  No mucositis or ulcers  RESPIRATORY:  No cough, shortness of breath, hemoptysis, or wheezing.  CARDIOVASCULAR:  No chest pain, palpitations, orthopnea, or dyspnea on exertion.  GASTROINTESTINAL: She did not complain of any nausea vomiting diarrhea or constipation today.  GENITOURINARY:  No dysuria or hematuria.  MUSCULOSKELETAL:  Left ankle pain has overall improved  "after injections.  Right breast tenderness.  NEUROLOGIC:  Numbness in her hands and feet which is stable  PSYCHIATRIC:  Anxiety improved.    Vitals:    12/13/18 1039   BP: 131/62   Pulse: 69   Resp: 18   Temp: 98.5 °F (36.9 °C)   TempSrc: Oral   SpO2: 95%   Weight: 81.1 kg (178 lb 14.4 oz)   Height: 165 cm (64.96\")   PainSc: 0-No pain  Comment: breast cancer       PHYSICAL EXAMINATION:  GENERAL:  Well-developed well-nourished female; awake, alert and oriented, in no acute distress.  SKIN: Rash  HEAD:  Normocephalic, atraumatic. Alopecia   EYES:  Pupils equal, round and reactive to light.  Extraocular movements intact.  Conjunctivae normal.  EARS:  Hearing intact.  NOSE:  Septum midline.  No excoriations or nasal discharge.  MOUTH:  No stomatitis or ulcers.  Lips are normal.  THROAT:  Oropharynx without lesions or exudates.  NECK:  Supple with good range of motion; no thyromegaly or masses; no JVD or bruits.  There is some fullness in the left supraclavicular area but no discrete mass.  This area is a little tender.  LYMPHATICS:  No cervical, supraclavicular, axillary, or inguinal lymphadenopathy.  CHEST:  Lungs are clear to auscultation bilaterally.  No wheezes, rales, or rhonchi.  A Mediport is present and is benign in appearance in the right subclavian area.  BREASTS: Not examined today at her request  HEART:  Regular rate; normal rhythm.  No murmurs, gallops or rubs.  ABDOMEN:  Soft, non-tender, non-distended.  Normal active bowel sounds.  No organomegaly  EXTREMITIES:  No clubbing, cyanosis, or edema.  NEUROLOGICAL:  No focal neurologic deficits.    DIAGNOSTIC DATA:  Lab Results   Component Value Date    WBC 5.62 12/13/2018    HGB 13.9 12/13/2018    HCT 40.3 12/13/2018    .5 (H) 12/13/2018     12/13/2018       Lab Results   Component Value Date    GLUCOSE 137 (H) 11/09/2016    BUN 18 05/24/2018    CREATININE 0.72 05/24/2018    EGFRIFNONA 81 05/24/2018    EGFRIFAFRI 99 05/24/2018    BCR 25.0 " 05/24/2018    CO2 26.0 05/24/2018    CALCIUM 9.2 05/24/2018    PROTENTOTREF 6.2 05/24/2018    ALBUMIN 4.40 05/24/2018    LABIL2 2.4 05/24/2018    AST 18 05/24/2018    ALT 20 05/24/2018     Lab Results   Component Value Date    GLUCOSE 137 (H) 11/09/2016    CALCIUM 9.2 05/24/2018     05/24/2018    K 4.1 05/24/2018    CO2 26.0 05/24/2018     05/24/2018    BUN 18 05/24/2018    CREATININE 0.72 05/24/2018    EGFRIFAFRI 99 05/24/2018    EGFRIFNONA 81 05/24/2018    BCR 25.0 05/24/2018    ANIONGAP 13.1 11/09/2016     Imaging: Right breast ultrasound on 6/5/2018 read as BI-RADS Category 2.  Mammography in 6 months recommended.    ASSESSMENT:  This is a 68 y.o. female with:  1.  History of stage I carcinoma of the right breast.  The biopsy initially showed DCIS.  There was a tiny invasive component on the surgical specimen.  This was minimally hormone receptor positive and HER-2 was not able to be tested.  She had radiation following her right breast lumpectomy but no medical therapy.  2.  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  She completed 6 cycles of neoadjuvant chemotherapy with TCH P followed by a lumpectomy on 10/5/2016.  No residual invasive carcinoma in the breast but there was extensive DCIS with very close margins.  One lymph node was positive for metastatic carcinoma.  We discussed her case at the multidisciplinary breast conference.  She had a mammogram that showed some persistent suspicious calcifications and therefore on 11/16/2016 had a left modified radical mastectomy with axillary lymph node sampling.  High-grade DCIS was present but no invasive carcinoma and no lymph nodes were positive.  Margins were very close.  She had a PET scan that was negative.  She completed radiation therapy.  She completed Herceptin on 4/21/2017.  3.  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  She continues a vitamin B  complex vitamin.  Stable  4.  Persistent right breast pain: Likely an adverse effect of surgery and radiation.  Imaging reassuring.  5.  Left supraclavicular fullness with some mild tenderness to palpation: I do not palpate a discrete lymph node or nodule there.  She will notify me if she notices any pain or nodularity.  6.  Macrocytosis: This is mild and stable.  She continues a vitamin B complex vitamin.  7.  Alopecia: Secondary to chemotherapy.  She can use Rogaine if desired.    PLAN:   1.  Continue a vitamin B complex vitamin  3.  I will see her back in 6 months for follow-up with a breast exam and a CBC.

## 2019-01-10 ENCOUNTER — OFFICE VISIT (OUTPATIENT)
Dept: CARDIOLOGY | Facility: CLINIC | Age: 69
End: 2019-01-10

## 2019-01-10 VITALS
HEART RATE: 71 BPM | BODY MASS INDEX: 29.92 KG/M2 | WEIGHT: 179.6 LBS | DIASTOLIC BLOOD PRESSURE: 80 MMHG | HEIGHT: 65 IN | SYSTOLIC BLOOD PRESSURE: 120 MMHG

## 2019-01-10 DIAGNOSIS — I47.29 NSVT (NONSUSTAINED VENTRICULAR TACHYCARDIA) (HCC): ICD-10-CM

## 2019-01-10 DIAGNOSIS — I49.3 VPC (VENTRICULAR PREMATURE COMPLEX): Primary | ICD-10-CM

## 2019-01-10 PROCEDURE — 99213 OFFICE O/P EST LOW 20 MIN: CPT | Performed by: INTERNAL MEDICINE

## 2019-01-10 PROCEDURE — 93000 ELECTROCARDIOGRAM COMPLETE: CPT | Performed by: INTERNAL MEDICINE

## 2019-01-10 NOTE — PROGRESS NOTES
Date of Office Visit: 01/10/2019  Encounter Provider: Phong Sunshine MD  Place of Service: [facility] CARDIOLOGY  Patient Name: Yuli Huang  :1950    Chief Complaint   Patient presents with   • Irregular Heart Beat   :     HPI: Yuli Huang is a 68 y.o. female who presents today to followup. She has a history of extremely frequent PVCs and has even been noted to have some nonsustained ventricular tachycardia on monitoring. It has looked like right ventricular outflow tract ectopy. She had a normal cardiac catheterization and a normal echocardiogram in . She has been maintained on metoprolol and she has done extremely well with this.     She completed chemotherapy and radiation for breast cancer in 2017.  She had serial echocardiograms due to Herceptin use, and thankfully her LVEF remained normal.      A left carotid bruit was reported in 2017; a carotid doppler showed very mild (1-15%) atherosclerosis of the left internal carotid.    Past Medical History:   Diagnosis Date   • Atypical squamous cell changes of undetermined significance (ASCUS) on cervical cytology with negative high risk human papilloma virus (HPV) test result  and 2006: LGSIL on Pap, HPV effect.  All subsequent Paps were neg until , Pap with ASCUS.  Neg HR-HPV.  All subsequent Paps have been normal.  , ,  neg paps, & clinic neg HR-HPV.   • Breast cancer (CMS/HCC)     Right Breast: Stage IA, invasive mammary cancer associated with DCIS.  Status post right lumpectomy with radiation.  Declined Arimidex therapy.   • Breast cancer (CMS/HCC) 2016    Left, stage IIIC: S/P LEFT BREAST MASTECTOMY WITH LEFT AXILLARY NODE DISSECTION;  Surgeon: Denis Cortes MD;  Location: [facility] MAIN OR   • Bruit of left carotid artery 2017    Gordon a faint  left carotid bruit on annual exam, asymptomatic.  Carotid Doppler detected mild obstruction, but no plaque.  Left carotid artery is  tortuous.  Right side is negative.   • Concussion    • Depression    • Diverticulosis of sigmoid colon 2008    Colonoscopy by Dr. Tung Cortes: Mild sigmoid diverticulosis.  No masses or polyps.   • DUB (dysfunctional uterine bleeding) 1996    Dysfunctional uterine bleeding.  Probable anovulatory cycle.  Endometrial biopsy with proliferative endometrium.   • Fever blister    • Glaucoma    • H/O diplopia    • History of migraine    • History of postmenopausal HRT     Post menopause hormones for 4 years   • History of radiation therapy 07/10/2013    07/10/2013 - 08/23/2013  right breast, 1/2017-3/2017 left breast   • Hyperlipidemia    • Hypertriglyceridemia    • Incontinence of urine    • LGSIL of cervix of undetermined significance 1995    Only on PAP SMEAR, HPV EFFECT, NOT TRUE MILD DYSPLASIA.   • Menopause 2002    Took HRT for about 4 years.   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  Endometrial biopsy showed scant endometrium with hyperplastic polyp.    • PONV (postoperative nausea and vomiting)    • Scarlet fever    • Status post chemotherapy 04/17/2017 04/29/2016 - August 2016 -  4 drugs -Herceptin, perjeta, carboplatin, taxotere.  Then continued every three weeks with Herceptin until April 17, 2017.    • Urinary frequency    • Vertigo    • VPC (ventricular premature complex)        Past Surgical History:   Procedure Laterality Date   • BACK SURGERY      Discectomy   • BREAST BIOPSY      Stereotactic biopsey of right breast   • BREAST LUMPECTOMY Right 06/04/2013   • BREAST LUMPECTOMY WITH SENTINEL NODE BIOPSY AND AXILLARY NODE DISSECTION Left 10/5/2016    Procedure: LT BREAST LUMPECTOMY WITH  "MAMMO NEEDLE LOC W/ SENTINEL NODE BIOPSY AND POSS AXILLARY NODE DISSECTION;  Surgeon: Denis Cortes MD;  Location: [facility];  Service:    • BREAST SURGERY  2013    Lumpectomy   • CARDIAC CATHETERIZATION     • CERVICAL DISCECTOMY ANTERIOR      C5-C6 SPINAL FUSION WITH DISCECTOMY   •  SECTION      baby boy \"RAPHAEL\"   • CLOSED REDUCTION TIBIAL SHAFT FRACTURE     • COLONOSCOPY     • EYE SURGERY     • LUMBAR FUSION     • MASTECTOMY Left 2016    Procedure: LEFT BREAST MASTECTOMY WITH LEFT AXILLARY NODE DISSECTION;  Surgeon: Denis Cortes MD;  Location: [facility];  Service:    • OTHER SURGICAL HISTORY      cardiac cath   • POSTPARTUM TUBAL LIGATION     • SENTINEL LYMPH NODE BIOPSY         Social History     Socioeconomic History   • Marital status:      Spouse name: Not on file   • Number of children: 1   • Years of education: 13   • Highest education level: Not on file   Social Needs   • Financial resource strain: Not on file   • Food insecurity - worry: Not on file   • Food insecurity - inability: Not on file   • Transportation needs - medical: Not on file   • Transportation needs - non-medical: Not on file   Occupational History     Employer: [employer]     Employer: RETIRED   Tobacco Use   • Smoking status: Former Smoker     Packs/day: 2.00     Types: Cigarettes     Start date:      Last attempt to quit:      Years since quittin.0   • Smokeless tobacco: Never Used   • Tobacco comment: Quit for 10 years; 2 packs / day for 30 years (60 pack years).   Substance and Sexual Activity   • Alcohol use: Yes     Alcohol/week: 0.6 oz     Types: 1 Glasses of wine per week     Comment: caffeine use: ONE CUP DAILY / WINE 1-2 X PER MONTH.    • Drug use: No   • Sexual activity: Defer     Birth control/protection: Post-menopausal   Other Topics Concern   • Not on file   Social History Narrative   • Not on file       Family History   Problem " Relation Age of Onset   • Heart disease Father    • Abnormal EKG Father    • Heart attack Father 55   • Breast cancer Mother 81   • Diabetes type II Mother    • Dementia Mother    • Stroke Mother    • Breast cancer Sister 68        BRCA NEGATIVE    • No Known Problems Maternal Grandmother    • No Known Problems Maternal Grandfather    • No Known Problems Paternal Grandmother    • No Known Problems Paternal Grandfather    • Heart attack Paternal Uncle 54   • Heart disease Paternal Uncle    • Heart attack Paternal Uncle 54   • Heart disease Paternal Uncle    • Mental illness Sister    • Celiac disease Sister    • No Known Problems Son         MARINES        Review of Systems   Constitution: Positive for malaise/fatigue.   Cardiovascular: Negative for palpitations.   Respiratory: Negative for shortness of breath.    Musculoskeletal: Positive for joint pain.   All other systems reviewed and are negative.      Allergies   Allergen Reactions   • Nitrofurantoin Anaphylaxis   • Celecoxib Nausea Only     See phone message dated 3/2/2018   • Amoxicillin Diarrhea and Rash         Current Outpatient Medications:   •  acetaminophen (TYLENOL) 500 MG tablet, Take 500 mg by mouth Every 6 (Six) Hours As Needed for Mild Pain ., Disp: , Rfl:   •  alendronate (FOSAMAX) 70 MG tablet, Take 1 tablet by mouth Every 7 (Seven) Days., Disp: 4 tablet, Rfl: 12  •  aspirin 81 MG EC tablet, Take 81 mg by mouth Daily., Disp: , Rfl:   •  atorvastatin (LIPITOR) 40 MG tablet, Take 40 mg by mouth Every Evening., Disp: , Rfl:   •  Calcium Carbonate (CALTRATE 600 PO), Take 1 tablet by mouth Every Morning., Disp: , Rfl:   •  diclofenac (VOLTAREN) 75 MG EC tablet, TAKE 1 TABLET TWICE A DAY, Disp: , Rfl:   •  diphenoxylate-atropine (LOMOTIL) 2.5-0.025 MG per tablet, Take 2 tablets by mouth 4 (Four) Times a Day As Needed for diarrhea., Disp: , Rfl:   •  famciclovir (FAMVIR) 500 MG tablet, Take 500 mg by mouth 3 (Three) Times a Day As Needed (cold sore).,  "Disp: , Rfl:   •  folic acid (FOLVITE) 400 MCG tablet, Take 400 mcg by mouth Every Morning., Disp: , Rfl:   •  hyoscyamine (ANASPAZ,LEVSIN) 0.125 MG tablet, Take 0.125 mg by mouth Every 4 (Four) Hours As Needed (overactive bladder)., Disp: , Rfl:   •  meclizine (ANTIVERT) 25 MG tablet, Take 25 mg by mouth 3 (Three) Times a Day As Needed., Disp: , Rfl:   •  metoprolol succinate XL (TOPROL-XL) 25 MG 24 hr tablet, TAKE ONE-HALF TABLET BY MOUTH DAILY, Disp: 45 tablet, Rfl: 0  •  Multiple Vitamins-Minerals (MULTIVITAL PO), Take 1 tablet/day by mouth Every Morning., Disp: , Rfl:   •  saccharomyces boulardii (FLORASTOR) 250 MG capsule, Take 250 mg by mouth 2 (Two) Times a Day., Disp: , Rfl:   •  bimatoprost (LUMIGAN) 0.01 % ophthalmic drops, , Disp: , Rfl:      Objective:     Vitals:    01/10/19 1016   BP: 120/80   BP Location: Right arm   Pulse: 71   Weight: 81.5 kg (179 lb 9.6 oz)   Height: 165 cm (64.96\")     Body mass index is 29.92 kg/m².    Physical Exam   Constitutional: She is oriented to person, place, and time. She appears well-developed and well-nourished.   HENT:   Head: Normocephalic.   Nose: Nose normal.   Mouth/Throat: Oropharynx is clear and moist.   Eyes: Conjunctivae and EOM are normal. Pupils are equal, round, and reactive to light.   Neck: Normal range of motion. No JVD present.   Cardiovascular: Normal rate, regular rhythm, normal heart sounds and intact distal pulses.   No murmur heard.  Pulmonary/Chest: Effort normal and breath sounds normal.   Abdominal: Soft. She exhibits no mass. There is no tenderness.   Musculoskeletal: Normal range of motion. She exhibits no edema.   Lymphadenopathy:     She has no cervical adenopathy.   Neurological: She is alert and oriented to person, place, and time. No cranial nerve deficit.   Skin: Skin is warm and dry. No rash noted.   She has lost a lot of her hair due to chemo     Psychiatric: She has a normal mood and affect. Her behavior is normal. Judgment and " thought content normal.   Vitals reviewed.        ECG 12 Lead  Date/Time: 1/10/2019 1:41 PM  Performed by: Phong Sunshine MD  Authorized by: Phong Sunshine MD   Comparison: compared with previous ECG   Similar to previous ECG  Rhythm: sinus rhythm  Conduction: conduction normal  ST Segments: ST segments normal  T Waves: T waves normal  QRS axis: normal  Other: no other findings  Clinical impression: normal ECG              Assessment:       Diagnosis Plan   1. VPC (ventricular premature complex)     2. NSVT (nonsustained ventricular tachycardia) (CMS/HCC)            Plan:       She has a history of PVCs and NSVT from an RVOT source.  She has essentially normal coronaries (luminal irregularities only, 10-20%) and a structurally normal heart.  She's doing well with metoprolol and is asymptomatic.  She was noted to have very mild atherosclerosis of the left ICA; I don't notice a bruit today.  She's on a statin and low dose aspirin.     Sincerely,       Phong Sunshine MD

## 2019-02-13 RX ORDER — METOPROLOL SUCCINATE 25 MG/1
TABLET, EXTENDED RELEASE ORAL
Qty: 45 TABLET | Refills: 3 | Status: SHIPPED | OUTPATIENT
Start: 2019-02-13 | End: 2020-02-07 | Stop reason: SDUPTHER

## 2019-02-18 ENCOUNTER — DOCUMENTATION (OUTPATIENT)
Dept: PHYSICAL THERAPY | Facility: HOSPITAL | Age: 69
End: 2019-02-18

## 2019-02-18 DIAGNOSIS — C50.212 MALIGNANT NEOPLASM OF UPPER-INNER QUADRANT OF LEFT FEMALE BREAST, UNSPECIFIED ESTROGEN RECEPTOR STATUS (HCC): ICD-10-CM

## 2019-02-18 DIAGNOSIS — Z85.3 HISTORY OF BREAST CANCER: ICD-10-CM

## 2019-02-18 DIAGNOSIS — R73.02 IMPAIRED GLUCOSE TOLERANCE: ICD-10-CM

## 2019-02-18 DIAGNOSIS — Z98.890 S/P LUMPECTOMY, LEFT BREAST: Primary | ICD-10-CM

## 2019-02-18 DIAGNOSIS — M25.512 ACUTE PAIN OF LEFT SHOULDER: ICD-10-CM

## 2019-02-18 DIAGNOSIS — Z90.12 S/P LEFT MASTECTOMY: ICD-10-CM

## 2019-02-18 DIAGNOSIS — C50.411 MALIGNANT NEOPLASM OF UPPER-OUTER QUADRANT OF RIGHT FEMALE BREAST, UNSPECIFIED ESTROGEN RECEPTOR STATUS (HCC): ICD-10-CM

## 2019-02-18 DIAGNOSIS — M25.612 STIFF SHOULDER, LEFT: ICD-10-CM

## 2019-02-18 DIAGNOSIS — R29.3 ABNORMAL POSTURE: ICD-10-CM

## 2019-02-18 DIAGNOSIS — Z48.89 AFTERCARE FOLLOWING SURGERY: ICD-10-CM

## 2019-02-18 NOTE — THERAPY DISCHARGE NOTE
Outpatient Physical Therapy Discharge Summary         Patient Name: Yuli Huang  : 1950  MRN: [id number]    Today's Date: 2019    Visit Dx:    ICD-10-CM ICD-9-CM   1. S/P lumpectomy, left breast Z98.890 V45.89   2. Aftercare following surgery Z48.89 V58.89   3. Acute pain of left shoulder M25.512 719.41   4. Stiff shoulder, left M25.612 719.51   5. Abnormal posture R29.3 781.92   6. S/P left mastectomy Z90.12 V45.71   7. Malignant neoplasm of upper-inner quadrant of left female breast, unspecified estrogen receptor status (CMS/HCC) C50.212 174.2   8. History of breast cancer Z85.3 V10.3   9. Malignant neoplasm of upper-outer quadrant of right female breast, unspecified estrogen receptor status (CMS/HCC) C50.411 174.4   10. Impaired glucose tolerance R73.02 790.22       PT OP Goals     Row Name 19 1300          PT Short Term Goals    STG 1  Patient independent and compliant with initial home exercise program focused on diaphragmatic breathing, range of motion, flexibility to decrease edema and improve lymphatic flow for decreased edema and decreased risk of infection.   -SC     STG 1 Progress  Met  -SC     STG 2  Patient demonstrate proper awareness of What is Lymphedema and 18 Steps of Prevention for improved prevention, management, care of symptoms and ease of transition to self-care of condition.   -SC     STG 2 Progress  Met  -SC     STG 3  Patient independent and compliant with daytime OTS prevention compression garments as indicated for decreased risk of lymphedema and infection and safety with transition of self-care of condition.   -SC     STG 3 Progress  Met  -SC     STG 4  Patient's PROM left shoulder flexion >/= 160 degrees for improved mobility, decreased axillary cording syndrome, decreased risk of edema, and improved posture, function for return to prior level of functioning  -SC     STG 4 Progress  Met  -SC        Long Term Goals    LTG 1  Patient independent and compliant  with advanced Home Exercise Program for self-management of condition.   -SC     LTG 1 Progress  Met  -SC     LTG 2  Patient demonstrate proper awareness of Care and Air Travel safety with compression/exercise as indicated for improved safety with travel and self-care of condition.   -SC     LTG 2 Progress  Met  -SC     LTG 3  Patient demonstrate independence and compliance with proper skin care during/post radiation for improved mobility, decreased scar tissue, axillary cording and edema.  -SC     LTG 3 Progress  Met  -SC     LTG 4  Patient improved AROM left shoulder flexion in seated/standing positions >/= 160 degrees for improved function in home and community for safety with return to prior level of functioning and transition to self-care of condition.   -SC     LTG 4 Progress  Met  -SC     LTG 5  Patient score </=25/100 on DASH for improved function and transition to self-management of condition.   -SC     LTG 5 Progress  Met  -SC     LTG 6  Patient's bioimpedance score to remain between 1-7 for stabilization and decreased risk of developing stage II non reversible chronic post mastectomy lymphedema syndrome left UE.  -SC     LTG 6 Progress  Ongoing  -SC     LTG 6 Progress Comments  canceled scheduled appointment 11/02/2018 states no issues  -SC       User Key  (r) = Recorded By, (t) = Taken By, (c) = Cosigned By    Initials Name Provider Type    Janet Gordon, PT Physical Therapist          OP PT Discharge Summary  Date of Discharge: 02/18/19  Reason for Discharge: Maximum functional potential achieved, other (comment)(per patient's wishes)  Outcomes Achieved: Patient able to partially acheive established goals  Discharge Destination: Home with home program  Discharge Instructions/Additional Comments: Patient called and canceled her 6 month f/u bioimpedance, stating having no issues. She is to contact us with any questions, concerns, or changes in symptoms. Was educated on importance of continued  bioimpedance 6 months to yearly for early intervention/prevention of lymphedema.         Janet Martinez, PT  2/18/2019

## 2019-04-29 ENCOUNTER — APPOINTMENT (OUTPATIENT)
Dept: WOMENS IMAGING | Facility: HOSPITAL | Age: 69
End: 2019-04-29

## 2019-04-29 PROCEDURE — 77063 BREAST TOMOSYNTHESIS BI: CPT | Performed by: RADIOLOGY

## 2019-04-29 PROCEDURE — 77067 SCR MAMMO BI INCL CAD: CPT | Performed by: RADIOLOGY

## 2019-05-29 ENCOUNTER — OFFICE VISIT (OUTPATIENT)
Dept: OBSTETRICS AND GYNECOLOGY | Age: 69
End: 2019-05-29

## 2019-05-29 VITALS
DIASTOLIC BLOOD PRESSURE: 76 MMHG | WEIGHT: 168.2 LBS | HEIGHT: 65 IN | BODY MASS INDEX: 28.02 KG/M2 | SYSTOLIC BLOOD PRESSURE: 120 MMHG

## 2019-05-29 DIAGNOSIS — Z78.0 MENOPAUSE: ICD-10-CM

## 2019-05-29 DIAGNOSIS — Z17.1 MALIGNANT NEOPLASM OF UPPER-INNER QUADRANT OF LEFT BREAST IN FEMALE, ESTROGEN RECEPTOR NEGATIVE (HCC): ICD-10-CM

## 2019-05-29 DIAGNOSIS — C50.411 MALIGNANT NEOPLASM OF UPPER-OUTER QUADRANT OF RIGHT FEMALE BREAST, UNSPECIFIED ESTROGEN RECEPTOR STATUS (HCC): ICD-10-CM

## 2019-05-29 DIAGNOSIS — Z87.39 PERSONAL HISTORY OF OSTEOPOROSIS: Primary | ICD-10-CM

## 2019-05-29 DIAGNOSIS — Z13.89 SCREENING FOR BLOOD OR PROTEIN IN URINE: ICD-10-CM

## 2019-05-29 DIAGNOSIS — C50.212 MALIGNANT NEOPLASM OF UPPER-INNER QUADRANT OF LEFT BREAST IN FEMALE, ESTROGEN RECEPTOR NEGATIVE (HCC): ICD-10-CM

## 2019-05-29 DIAGNOSIS — L90.0 LICHEN SCLEROSUS ET ATROPHICUS: ICD-10-CM

## 2019-05-29 LAB
BILIRUB BLD-MCNC: NEGATIVE MG/DL
CLARITY, POC: CLEAR
COLOR UR: YELLOW
GLUCOSE UR STRIP-MCNC: NEGATIVE MG/DL
KETONES UR QL: NEGATIVE
LEUKOCYTE EST, POC: ABNORMAL
NITRITE UR-MCNC: NEGATIVE MG/ML
PH UR: 5.5 [PH] (ref 5–8)
PROT UR STRIP-MCNC: NEGATIVE MG/DL
RBC # UR STRIP: NEGATIVE /UL
SP GR UR: 1.02 (ref 1–1.03)
UROBILINOGEN UR QL: NORMAL

## 2019-05-29 PROCEDURE — G0101 CA SCREEN;PELVIC/BREAST EXAM: HCPCS | Performed by: OBSTETRICS & GYNECOLOGY

## 2019-05-29 PROCEDURE — 81002 URINALYSIS NONAUTO W/O SCOPE: CPT | Performed by: OBSTETRICS & GYNECOLOGY
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
## 2019-05-30 PROBLEM — L90.0 LICHEN SCLEROSUS ET ATROPHICUS: Status: ACTIVE | Noted: 2019-05-30

## 2019-05-30 PROBLEM — Z17.1 MALIGNANT NEOPLASM OF UPPER-OUTER QUADRANT OF RIGHT BREAST IN FEMALE, ESTROGEN RECEPTOR NEGATIVE (HCC): Status: ACTIVE | Noted: 2019-05-30

## 2019-05-30 PROBLEM — C50.411 MALIGNANT NEOPLASM OF UPPER-OUTER QUADRANT OF RIGHT BREAST IN FEMALE, ESTROGEN RECEPTOR NEGATIVE: Status: ACTIVE | Noted: 2019-05-30

## 2019-06-12 ENCOUNTER — OFFICE VISIT (OUTPATIENT)
Dept: ONCOLOGY | Facility: CLINIC | Age: 69
End: 2019-06-12

## 2019-06-12 ENCOUNTER — LAB (OUTPATIENT)
Dept: LAB | Facility: HOSPITAL | Age: 69
End: 2019-06-12

## 2019-06-12 VITALS
HEIGHT: 65 IN | WEIGHT: 170.3 LBS | HEART RATE: 81 BPM | TEMPERATURE: 98.9 F | DIASTOLIC BLOOD PRESSURE: 79 MMHG | SYSTOLIC BLOOD PRESSURE: 157 MMHG | OXYGEN SATURATION: 95 % | RESPIRATION RATE: 16 BRPM | BODY MASS INDEX: 28.37 KG/M2

## 2019-06-12 DIAGNOSIS — D75.89 MACROCYTOSIS WITHOUT ANEMIA: ICD-10-CM

## 2019-06-12 DIAGNOSIS — Z85.3 HISTORY OF BREAST CANCER: ICD-10-CM

## 2019-06-12 DIAGNOSIS — Z85.3 HISTORY OF BREAST CANCER: Primary | ICD-10-CM

## 2019-06-12 LAB
BASOPHILS # BLD AUTO: 0.04 10*3/MM3 (ref 0–0.2)
BASOPHILS NFR BLD AUTO: 0.6 % (ref 0–1.5)
DEPRECATED RDW RBC AUTO: 45 FL (ref 37–54)
EOSINOPHIL # BLD AUTO: 0.1 10*3/MM3 (ref 0–0.4)
EOSINOPHIL NFR BLD AUTO: 1.4 % (ref 0.3–6.2)
ERYTHROCYTE [DISTWIDTH] IN BLOOD BY AUTOMATED COUNT: 12 % (ref 12.3–15.4)
HCT VFR BLD AUTO: 40.2 % (ref 34–46.6)
HGB BLD-MCNC: 13.6 G/DL (ref 12–15.9)
IMM GRANULOCYTES # BLD AUTO: 0.04 10*3/MM3 (ref 0–0.05)
IMM GRANULOCYTES NFR BLD AUTO: 0.6 % (ref 0–0.5)
LYMPHOCYTES # BLD AUTO: 1.69 10*3/MM3 (ref 0.7–3.1)
LYMPHOCYTES NFR BLD AUTO: 24.4 % (ref 19.6–45.3)
MCH RBC QN AUTO: 34.4 PG (ref 26.6–33)
MCHC RBC AUTO-ENTMCNC: 33.8 G/DL (ref 31.5–35.7)
MCV RBC AUTO: 101.8 FL (ref 79–97)
MONOCYTES # BLD AUTO: 0.57 10*3/MM3 (ref 0.1–0.9)
MONOCYTES NFR BLD AUTO: 8.2 % (ref 5–12)
NEUTROPHILS # BLD AUTO: 4.49 10*3/MM3 (ref 1.7–7)
NEUTROPHILS NFR BLD AUTO: 64.8 % (ref 42.7–76)
NRBC BLD AUTO-RTO: 0 /100 WBC (ref 0–0.2)
PLATELET # BLD AUTO: 218 10*3/MM3 (ref 140–450)
PMV BLD AUTO: 9.6 FL (ref 6–12)
RBC # BLD AUTO: 3.95 10*6/MM3 (ref 3.77–5.28)
WBC NRBC COR # BLD: 6.93 10*3/MM3 (ref 3.4–10.8)

## 2019-06-12 PROCEDURE — 85025 COMPLETE CBC W/AUTO DIFF WBC: CPT | Performed by: INTERNAL MEDICINE

## 2019-06-12 PROCEDURE — 36415 COLL VENOUS BLD VENIPUNCTURE: CPT | Performed by: INTERNAL MEDICINE

## 2019-06-12 PROCEDURE — 99214 OFFICE O/P EST MOD 30 MIN: CPT | Performed by: INTERNAL MEDICINE

## 2019-06-12 RX ORDER — FLUTICASONE PROPIONATE 50 MCG
2 SPRAY, SUSPENSION (ML) NASAL NIGHTLY
COMMUNITY
Start: 2019-04-05

## 2019-06-12 RX ORDER — DEXTROMETHORPHAN HYDROBROMIDE AND PROMETHAZINE HYDROCHLORIDE 15; 6.25 MG/5ML; MG/5ML
SYRUP ORAL
COMMUNITY
Start: 2019-05-20 | End: 2019-06-20

## 2019-06-12 RX ORDER — CEPHALEXIN 500 MG/1
CAPSULE ORAL
COMMUNITY
Start: 2019-04-05 | End: 2019-06-20

## 2019-06-12 RX ORDER — SULFAMETHOXAZOLE AND TRIMETHOPRIM 800; 160 MG/1; MG/1
TABLET ORAL
COMMUNITY
Start: 2019-05-20 | End: 2019-06-20

## 2019-06-12 NOTE — PROGRESS NOTES
[facility] OUTPATIENT CLINIC FOLLOW UP VISIT    REASON FOR FOLLOW-UP:      Malignant neoplasm of upper-inner quadrant of left female breast    Staging form: Breast, AJCC V7      Clinical stage from 4/14/2016: Stage IIIC (T3, N3b, M0) - Signed by Jordon Bonilla MD on 4/28/2016    Malignant neoplasm of upper-outer quadrant of right female breast    Staging form: Breast, AJCC V7      Clinical stage from 3/25/2016: Stage IA (rT1a, N0, M0) - Signed by Erika Osborn MD on 3/25/2016    HISTORY OF PRESENT ILLNESS:  Yuli Huang is a 68 y.o. female who returns today for follow up of the above issue.     She continues to feel about the same.  She is having some back spasms.  She denies any pain in her breast or chest.  No new skin changes.  Peripheral neuropathy is stable and very mild.  She has had a recent upper respiratory infection that started with sinusitis and now she continues to have a cough productive of some yellow to green sputum.  She denies any shortness of breath, chest pain, or wheezing.  No fevers or chills.  This is slowly improving.  She did take 2 courses of antibiotics with some improvement.      ONCOLOGIC HISTORY:     Malignant neoplasm of upper-outer quadrant of right female breast (CMS/HCC)    5/13/2013 Initial Diagnosis            5/13/2013 Biopsy     Biopsy:  Ductal carcinoma in situ.  ER 20%, ND 1-4%.  Her2 not done.         6/4/2013 Surgery     Lumpectomy by Dr. Denis Cortes.  2mm invasive cancer associated with DCIS.  ER/ND negative on the DCIS; unable to be performed on the invasive component.           6/18/2013 Surgery     [place] lymph node biopsy:  3 nodes negative.         7/10/2013 - 8/23/2013 Radiation     Radiation therapy to the right breast.           Malignant neoplasm of upper-inner quadrant of left female breast (CMS/HCC)    3/31/2016 Imaging     Ultrasound left breast:  10 o'clock position, 3x2cm mass with a 2cm axillary lymph node.         4/7/2016 Biopsy      Ultrasound-guided biopsy of the left breast and axillary lymph node.  ER/ME negative, HER-2 overexpressed.  Grade 2.           4/19/2016 Imaging     PET scan:  Left breast mass demonstrate significant metabolic activity.  There is a single 1.7 cm lymph node at the left axilla which  demonstrates significant metabolic activity for its size. There is also  a subcentimeter node in the left internal mammary chain which  nonetheless demonstrates discernible activity. I suspect an early erasto  metastasis. No further evidence for metastatic disease is present.         4/21/2016 Imaging     Breast MRI:  1. Biopsy-proven malignancy in the left breast extending from the 8:30  o'clock through 1 o'clock positions and measuring up to 7.6 cm in  greatest dimension. Adjacent satellite clumped foci of enhancement are  also noted in the upper outer and lower outer quadrants. Left axillary  adenopathy is also appreciated.  2. There are no findings suspicious for malignancy in the right breast.  Postbreast conservation therapy changes of the right breast are noted.         4/29/2016 - 8/15/2016 Chemotherapy     Neoadjuvant TCHP         9/8/2016 Imaging     MRI breasts:  1. Findings consistent with significant interval response to neoadjuvant  chemotherapy in the left breast. However, there remains some scattered  stippled foci of enhancement that are asymmetric in the left breast  compared to the right breast and they are from the 8 o'clock through 12  o'clock positions in distribution. This corresponds to a region that was  previously occupied by considerable neoplastic disease/malignancy, thus  microscopic multifocal disease is suspected. There has been resolution  of left axillary adenopathy.  2. There are no findings suspicious for malignancy in the right breast.         10/5/2016 Surgery     Lumpectomy with sentinel lymph node biopsy:  Breast:  DCIS spanning 3.3cm.  ER/ME negative.  Multifocal.  No invasive carcinoma  1 of 2  sentinel nodes positive for carcinoma measuring 1.1mm without extracapsular extension.           11/16/2016 Surgery     Left modified radical mastectomy with axillary lymph node sampling by Dr. Cortes.  High grade DCIS with margins <1mm.  10 benign axillary lymph nodes.         1/9/2017 Imaging     PET scan:  IMPRESSION:  The low-level activity at the left axillary surgical bed may  represent residual postoperative change. Residual malignancy in this  region cannot be entirely excluded. There is otherwise no abnormal  hypermetabolic activity or convincing evidence for metastatic disease.         1/18/2017 - 3/7/2017 Radiation             - 4/21/2017 Chemotherapy     OP BREAST Trastuzumab Q21D (maintenance)                 PAST MEDICAL, SURGICAL, FAMILY, AND SOCIAL HISTORIES WERE REVIEWED WITH THE PATIENT AND IN THE ELECTRONIC MEDICAL RECORD, AND WERE UPDATED IF INDICATED.    ALLERGIES:  Allergies   Allergen Reactions   • Nitrofurantoin Anaphylaxis     Rash and difficulty breathing and very faint.   • Celecoxib Nausea Only     See phone message dated 3/2/2018   • Amoxicillin Diarrhea     Severe diarrhea.       MEDICATIONS:  The medication list has been reviewed with the patient by the medical assistant, and the list has been updated in the electronic medical record, which I reviewed.  Medication dosages and frequencies were confirmed to be accurate.    REVIEW OF SYSTEMS:  PAIN:  See Vital Signs below.  GENERAL:  No fevers, chills, night sweats, or unintended weight loss.  SKIN:  Some alopecia on her scalp  HEME/LYMPH:  No abnormal bleeding.  No palpable lymphadenopathy.  EYES:  No vision changes or diplopia.  ENT:  No mucositis or ulcers  RESPIRATORY:  No cough, shortness of breath, hemoptysis, or wheezing.  CARDIOVASCULAR:  No chest pain, palpitations, orthopnea, or dyspnea on exertion.  GASTROINTESTINAL: She did not complain of any nausea vomiting diarrhea or constipation today.  GENITOURINARY:  No dysuria or  "hematuria.  MUSCULOSKELETAL:  Left ankle pain has overall improved after injections but she feels like she will require surgery at some point in the future.  Right breast tenderness.  NEUROLOGIC:  Numbness in her hands and feet which is stable  PSYCHIATRIC:  Anxiety improved.    Vitals:    06/12/19 1358   BP: 157/79   Pulse: 81   Resp: 16   Temp: 98.9 °F (37.2 °C)   TempSrc: Oral   SpO2: 95%   Weight: 77.2 kg (170 lb 4.8 oz)   Height: 165.3 cm (65.08\")   PainSc: 0-No pain  Comment: breast cancer       PHYSICAL EXAMINATION:  GENERAL:  Well-developed well-nourished female; awake, alert and oriented, in no acute distress.  SKIN: Rash  HEAD:  Normocephalic, atraumatic. Alopecia   EYES:  Pupils equal, round and reactive to light.  Extraocular movements intact.  Conjunctivae normal.  EARS:  Hearing intact.  NOSE:  Septum midline.  No excoriations or nasal discharge.  MOUTH:  No stomatitis or ulcers.  Lips are normal.  THROAT:  Oropharynx without lesions or exudates.  NECK:  Supple with good range of motion; no thyromegaly or masses; no JVD or bruits.  There is some fullness in the left supraclavicular area but no discrete mass.  This is stable.  This area is a little tender.  LYMPHATICS:  No cervical, supraclavicular, axillary, or inguinal lymphadenopathy.  CHEST:  Lungs are clear to auscultation bilaterally.  No wheezes, rales, or rhonchi.    BREASTS: The left chest and right breast were examined today.  No skin changes or subcutaneous nodules.  Stable postoperative changes in the right breast.  No axillary adenopathy.  No nipple discharge.  HEART:  Regular rate; normal rhythm.  No murmurs, gallops or rubs.  ABDOMEN:  Soft, non-tender, non-distended.  Normal active bowel sounds.  No organomegaly  EXTREMITIES:  No clubbing, cyanosis, or edema.  NEUROLOGICAL:  No focal neurologic deficits.    DIAGNOSTIC DATA:  Lab Results   Component Value Date    WBC 6.93 06/12/2019    HGB 13.6 06/12/2019    HCT 40.2 06/12/2019    MCV " 101.8 (H) 06/12/2019     06/12/2019       Lab Results   Component Value Date    GLUCOSE 137 (H) 11/09/2016    BUN 28 (H) 02/21/2019    CREATININE 0.8 02/21/2019    EGFRIFNONA 81 05/24/2018    EGFRIFAFRI 99 05/24/2018    BCR 36.0 (H) 02/21/2019    CO2 26 02/21/2019    CALCIUM 9.2 02/21/2019    PROTENTOTREF 6.2 05/24/2018    ALBUMIN 4.3 02/21/2019    LABIL2 1.7 02/21/2019    AST 18 02/21/2019    ALT 19 02/21/2019     Lab Results   Component Value Date    GLUCOSE 137 (H) 11/09/2016    CALCIUM 9.2 02/21/2019     02/21/2019    K 4.4 02/21/2019    CO2 26 02/21/2019     02/21/2019    BUN 28 (H) 02/21/2019    CREATININE 0.8 02/21/2019    EGFRIFAFRI 99 05/24/2018    EGFRIFNONA 81 05/24/2018    BCR 36.0 (H) 02/21/2019    ANIONGAP 13.1 11/09/2016     Imaging: Right breast mammogram 4/29/2019 BI-RADS Category 2.    ASSESSMENT:  This is a 68 y.o. female with:  1.  History of stage I carcinoma of the right breast.  The biopsy initially showed DCIS.  There was a tiny invasive component on the surgical specimen.  This was minimally hormone receptor positive and HER-2 was not able to be tested.  She had radiation following her right breast lumpectomy but no medical therapy.  2.  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  She completed 6 cycles of neoadjuvant chemotherapy with TCH P followed by a lumpectomy on 10/5/2016.  No residual invasive carcinoma in the breast but there was extensive DCIS with very close margins.  One lymph node was positive for metastatic carcinoma.  We discussed her case at the multidisciplinary breast conference.  She had a mammogram that showed some persistent suspicious calcifications and therefore on 11/16/2016 had a left modified radical mastectomy with axillary lymph node sampling.  High-grade DCIS was present but no invasive carcinoma and no lymph nodes were positive.  Margins were very close.  She had a PET scan  that was negative.  She completed radiation therapy.  She completed Herceptin on 4/21/2017.  3.  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  She continues a vitamin B complex vitamin.  Stable  4.  Persistent right breast pain: Likely an adverse effect of surgery and radiation.  Imaging reassuring.  5.  Left supraclavicular fullness with some mild tenderness to palpation: I do not palpate a discrete lymph node or nodule there.  She will notify me if she notices any pain or nodularity.  6.  Macrocytosis: This is mild and stable.  She continues a vitamin B complex vitamin.  She denies significant alcohol use.  We will monitor.  7.  Alopecia: Secondary to chemotherapy.  She can use Rogaine if desired.    PLAN:   1.  Continue a vitamin B complex   3.  I will see her back in 6 months for follow-up with a breast exam and a CBC.

## 2019-06-14 ENCOUNTER — OFFICE VISIT (OUTPATIENT)
Dept: RADIATION ONCOLOGY | Facility: HOSPITAL | Age: 69
End: 2019-06-14

## 2019-06-14 VITALS
WEIGHT: 170 LBS | DIASTOLIC BLOOD PRESSURE: 75 MMHG | BODY MASS INDEX: 28.22 KG/M2 | TEMPERATURE: 98.9 F | SYSTOLIC BLOOD PRESSURE: 139 MMHG | HEART RATE: 77 BPM | OXYGEN SATURATION: 95 %

## 2019-06-14 DIAGNOSIS — C50.212 MALIGNANT NEOPLASM OF UPPER-INNER QUADRANT OF LEFT BREAST IN FEMALE, ESTROGEN RECEPTOR NEGATIVE (HCC): ICD-10-CM

## 2019-06-14 DIAGNOSIS — Z17.1 MALIGNANT NEOPLASM OF UPPER-INNER QUADRANT OF LEFT BREAST IN FEMALE, ESTROGEN RECEPTOR NEGATIVE (HCC): ICD-10-CM

## 2019-06-14 DIAGNOSIS — C50.411 MALIGNANT NEOPLASM OF UPPER-OUTER QUADRANT OF RIGHT FEMALE BREAST, UNSPECIFIED ESTROGEN RECEPTOR STATUS (HCC): Primary | ICD-10-CM

## 2019-06-14 PROCEDURE — 99214 OFFICE O/P EST MOD 30 MIN: CPT | Performed by: RADIOLOGY
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
## 2019-06-20 ENCOUNTER — TELEPHONE (OUTPATIENT)
Dept: ONCOLOGY | Facility: HOSPITAL | Age: 69
End: 2019-06-20

## 2019-06-20 ENCOUNTER — TELEPHONE (OUTPATIENT)
Dept: OBSTETRICS AND GYNECOLOGY | Age: 69
End: 2019-06-20

## 2019-06-20 ENCOUNTER — OFFICE VISIT (OUTPATIENT)
Dept: OBSTETRICS AND GYNECOLOGY | Age: 69
End: 2019-06-20

## 2019-06-20 ENCOUNTER — PROCEDURE VISIT (OUTPATIENT)
Dept: OBSTETRICS AND GYNECOLOGY | Age: 69
End: 2019-06-20

## 2019-06-20 VITALS
BODY MASS INDEX: 27.99 KG/M2 | HEIGHT: 65 IN | DIASTOLIC BLOOD PRESSURE: 98 MMHG | WEIGHT: 168 LBS | SYSTOLIC BLOOD PRESSURE: 132 MMHG

## 2019-06-20 DIAGNOSIS — L90.0 LICHEN SCLEROSUS ET ATROPHICUS: Primary | ICD-10-CM

## 2019-06-20 DIAGNOSIS — Z87.39 PERSONAL HISTORY OF OSTEOPOROSIS: ICD-10-CM

## 2019-06-20 DIAGNOSIS — Z78.0 POST-MENOPAUSAL: Primary | ICD-10-CM

## 2019-06-20 PROCEDURE — 77080 DXA BONE DENSITY AXIAL: CPT | Performed by: OBSTETRICS & GYNECOLOGY

## 2019-06-20 PROCEDURE — 99212 OFFICE O/P EST SF 10 MIN: CPT | Performed by: OBSTETRICS & GYNECOLOGY

## 2019-06-20 RX ORDER — DOXYCYCLINE 100 MG/1
CAPSULE ORAL
COMMUNITY
Start: 2019-06-19 | End: 2019-12-11

## 2019-06-20 RX ORDER — ALENDRONATE SODIUM 70 MG/1
70 TABLET ORAL
Qty: 4 TABLET | Refills: 12 | Status: CANCELLED | OUTPATIENT
Start: 2019-06-20

## 2019-06-20 NOTE — TELEPHONE ENCOUNTER
Regarding: Prescription Question  Contact: [phone]  ----- Message from Mychart, Generic sent at 6/20/2019 11:53 AM EDT -----    Please change my [facility] [place] [address] [facility] [place] [address]  phone [phone]  fax [phone]      Pharmacy changed in chart

## 2019-06-20 NOTE — PROGRESS NOTES
" GYN PROBLEM EXAM                                    2019    Subjective   Yuli Huang is a 68 y.o.  Female,      Chief complaint:  Follow-up (F/u Lichen sclerosus.  New granddaughter born in Dec 2018, \"Miriam\". )    History of Present Illness:  Still Asymptomatic from the LS&A. Been using Betamethasone twice a day since May 29th.  Apparently I had mentioned the very beginnings of this at last years exam.    The following portions of the patient's history were reviewed / updated as appropriate:   allergies, current medications,  past medical history, past surgical history, past family history, past social history, and problem list.    Review of Systems no dysuria itching or irritation.    /98   Ht 165.3 cm (65.08\")   Wt 76.2 kg (168 lb)   LMP  (LMP Unknown) Comment: NO HRT  BMI 27.89 kg/m²     Objective   OBGyn Exam     PHYSICAL EXAM      Well-developed, well-nourished, overweight  female, in no distress, alert and well oriented ×3.         Pelvic exam : There are faint areas of vitiligo lateral to both labia majora.  The left labia majora is significantly larger than the right, this is unchanged.  There is definite lichen sclerosis over the clitoral area and the anterior commissure extending down to the small atrophic labia minora bilaterally.  It is densest at the clitoral valdes.  There are no specific hard areas that would be suspicious.  There is also some obvious lichen sclerosis in the area of the right side of the posterior fourchette, which is fairly attenuated.  There is also very faint lichen sclerosis change on the bottom medial surface of the left labia majora.      I think all of this has improved since her last visit.  I think should be able to go to twice daily betamethasone use on .         Mood and affect is normal.  Behavior normal.  Thought content normal.            Judgment normal.         Assessment/Plan   Yuli was seen " today for follow-up.    Diagnoses and all orders for this visit:    Lichen sclerosus et atrophicus      COMMENTS: Improvement of her lichen sclerosis and atrophicus with treatment with the betamethasone.  She will use the betamethasone now just twice daily, on Monday Wednesday and Friday and we will see her back in late July, to be followed up with Dr. Schulz, at the same time as her DEXA scan if this scheduling is possible.  She would also like to see Dr. Schulz for her annual exam which would not be till after May 24, 2020.    Avni Rudolph MD  6/20/2019

## 2019-06-20 NOTE — TELEPHONE ENCOUNTER
S/w former Dr Rudolph pt new Dr Schulz pt needing to schedule to est w Dr Schulz. Pleae offer 8/15/19 at 9 for 30 mins new pt appt gyn f/u for Lichens Sclerosis.

## 2019-06-20 NOTE — TELEPHONE ENCOUNTER
Regarding: Non-Urgent Medical Question  Contact: [phone]  ----- Message from Qzzr, Generic sent at 6/20/2019  1:15 PM EDT -----    Please note in my medical record that I had the first dose of the Shingles vaccine on August 28, 2018 and the second dose on May 14, 2019.

## 2019-06-24 ENCOUNTER — TELEPHONE (OUTPATIENT)
Dept: OBSTETRICS AND GYNECOLOGY | Age: 69
End: 2019-06-24

## 2019-06-24 DIAGNOSIS — Z87.39 PERSONAL HISTORY OF OSTEOPOROSIS: ICD-10-CM

## 2019-06-24 RX ORDER — ALENDRONATE SODIUM 70 MG/1
70 TABLET ORAL
Qty: 4 TABLET | Refills: 12 | Status: SHIPPED | OUTPATIENT
Start: 2019-06-24 | End: 2019-06-27

## 2019-06-27 ENCOUNTER — TELEPHONE (OUTPATIENT)
Dept: OBSTETRICS AND GYNECOLOGY | Age: 69
End: 2019-06-27

## 2019-06-27 DIAGNOSIS — Z87.39 PERSONAL HISTORY OF OSTEOPOROSIS: ICD-10-CM

## 2019-06-27 RX ORDER — ALENDRONATE SODIUM 70 MG/1
70 TABLET ORAL
Qty: 13 TABLET | Refills: 3 | Status: SHIPPED | OUTPATIENT
Start: 2019-06-27 | End: 2020-05-26 | Stop reason: SDUPTHER

## 2019-06-27 RX ORDER — ALENDRONATE SODIUM 70 MG/1
70 TABLET ORAL
Qty: 12 TABLET | Refills: 3 | Status: SHIPPED | OUTPATIENT
Start: 2019-06-27 | End: 2019-06-27 | Stop reason: SDUPTHER

## 2019-08-05 DIAGNOSIS — C50.411 MALIGNANT NEOPLASM OF UPPER-OUTER QUADRANT OF RIGHT FEMALE BREAST, UNSPECIFIED ESTROGEN RECEPTOR STATUS (HCC): Primary | ICD-10-CM

## 2019-08-05 DIAGNOSIS — Z85.3 HISTORY OF BREAST CANCER: ICD-10-CM

## 2019-08-15 ENCOUNTER — OFFICE VISIT (OUTPATIENT)
Dept: OBSTETRICS AND GYNECOLOGY | Age: 69
End: 2019-08-15

## 2019-08-15 DIAGNOSIS — N90.4 LICHEN SCLEROSUS OF FEMALE GENITALIA: Primary | ICD-10-CM

## 2019-08-15 PROCEDURE — 99213 OFFICE O/P EST LOW 20 MIN: CPT | Performed by: OBSTETRICS & GYNECOLOGY
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
## 2019-12-11 ENCOUNTER — LAB (OUTPATIENT)
Dept: LAB | Facility: HOSPITAL | Age: 69
End: 2019-12-11

## 2019-12-11 ENCOUNTER — OFFICE VISIT (OUTPATIENT)
Dept: ONCOLOGY | Facility: CLINIC | Age: 69
End: 2019-12-11

## 2019-12-11 VITALS
SYSTOLIC BLOOD PRESSURE: 153 MMHG | OXYGEN SATURATION: 97 % | BODY MASS INDEX: 27.14 KG/M2 | HEART RATE: 83 BPM | HEIGHT: 65 IN | DIASTOLIC BLOOD PRESSURE: 68 MMHG | RESPIRATION RATE: 16 BRPM | TEMPERATURE: 98.6 F | WEIGHT: 162.9 LBS

## 2019-12-11 DIAGNOSIS — Z85.3 HISTORY OF BREAST CANCER: ICD-10-CM

## 2019-12-11 DIAGNOSIS — D75.89 MACROCYTOSIS WITHOUT ANEMIA: Primary | ICD-10-CM

## 2019-12-11 LAB
BASOPHILS # BLD AUTO: 0.03 10*3/MM3 (ref 0–0.2)
BASOPHILS NFR BLD AUTO: 0.4 % (ref 0–1.5)
DEPRECATED RDW RBC AUTO: 43.7 FL (ref 37–54)
EOSINOPHIL # BLD AUTO: 0.05 10*3/MM3 (ref 0–0.4)
EOSINOPHIL NFR BLD AUTO: 0.6 % (ref 0.3–6.2)
ERYTHROCYTE [DISTWIDTH] IN BLOOD BY AUTOMATED COUNT: 11.5 % (ref 12.3–15.4)
HCT VFR BLD AUTO: 41.7 % (ref 34–46.6)
HGB BLD-MCNC: 14.3 G/DL (ref 12–15.9)
IMM GRANULOCYTES # BLD AUTO: 0.03 10*3/MM3 (ref 0–0.05)
IMM GRANULOCYTES NFR BLD AUTO: 0.4 % (ref 0–0.5)
LYMPHOCYTES # BLD AUTO: 1.94 10*3/MM3 (ref 0.7–3.1)
LYMPHOCYTES NFR BLD AUTO: 24 % (ref 19.6–45.3)
MCH RBC QN AUTO: 34.9 PG (ref 26.6–33)
MCHC RBC AUTO-ENTMCNC: 34.3 G/DL (ref 31.5–35.7)
MCV RBC AUTO: 101.7 FL (ref 79–97)
MONOCYTES # BLD AUTO: 0.55 10*3/MM3 (ref 0.1–0.9)
MONOCYTES NFR BLD AUTO: 6.8 % (ref 5–12)
NEUTROPHILS # BLD AUTO: 5.47 10*3/MM3 (ref 1.7–7)
NEUTROPHILS NFR BLD AUTO: 67.8 % (ref 42.7–76)
NRBC BLD AUTO-RTO: 0 /100 WBC (ref 0–0.2)
PLATELET # BLD AUTO: 243 10*3/MM3 (ref 140–450)
PMV BLD AUTO: 9.1 FL (ref 6–12)
RBC # BLD AUTO: 4.1 10*6/MM3 (ref 3.77–5.28)
WBC NRBC COR # BLD: 8.07 10*3/MM3 (ref 3.4–10.8)

## 2019-12-11 PROCEDURE — 99213 OFFICE O/P EST LOW 20 MIN: CPT | Performed by: INTERNAL MEDICINE

## 2019-12-11 PROCEDURE — 36415 COLL VENOUS BLD VENIPUNCTURE: CPT

## 2019-12-11 PROCEDURE — 85025 COMPLETE CBC W/AUTO DIFF WBC: CPT

## 2019-12-11 NOTE — PROGRESS NOTES
[facility] FOLLOW UP VISIT    REASON FOR FOLLOW-UP:      Malignant neoplasm of upper-inner quadrant of left female breast    Staging form: Breast, AJCC V7      Clinical stage from 4/14/2016: Stage IIIC (T3, N3b, M0) - Signed by Jordon Bonilla MD on 4/28/2016    Malignant neoplasm of upper-outer quadrant of right female breast    Staging form: Breast, AJCC V7      Clinical stage from 3/25/2016: Stage IA (rT1a, N0, M0) - Signed by Erika Osborn MD on 3/25/2016    HISTORY OF PRESENT ILLNESS:  Yuli Huang is a 69 y.o. female who returns today for follow up of the above issue.     She had several sinus infections that finally improved with starting a nasal steroid.  Very mild peripheral neuropathy persists.  Alopecia has not improved.  Energy level remains adequate.  She denies any new problems with the left chest wall or right breast.  The right breast remains tender.      ONCOLOGIC HISTORY:     Malignant neoplasm of upper-outer quadrant of right female breast (CMS/HCC)    5/13/2013 Initial Diagnosis         5/13/2013 Biopsy     Biopsy:  Ductal carcinoma in situ.  ER 20%, WA 1-4%.  Her2 not done.      6/4/2013 Surgery     Lumpectomy by Dr. Denis Cortes.  2mm invasive cancer associated with DCIS.  ER/WA negative on the DCIS; unable to be performed on the invasive component.        6/18/2013 Surgery     [place] lymph node biopsy:  3 nodes negative.      7/10/2013 - 8/23/2013 Radiation     Radiation therapy to the right breast.        Malignant neoplasm of upper-inner quadrant of left female breast (CMS/HCC)    3/31/2016 Imaging     Ultrasound left breast:  10 o'clock position, 3x2cm mass with a 2cm axillary lymph node.      4/7/2016 Biopsy     Ultrasound-guided biopsy of the left breast and axillary lymph node.  ER/WA negative, HER-2 overexpressed.  Grade 2.        4/19/2016 Imaging     PET scan:  Left breast mass demonstrate significant metabolic activity.  There is a single 1.7  cm lymph node at the left axilla which  demonstrates significant metabolic activity for its size. There is also  a subcentimeter node in the left internal mammary chain which  nonetheless demonstrates discernible activity. I suspect an early erasto  metastasis. No further evidence for metastatic disease is present.      4/21/2016 Imaging     Breast MRI:  1. Biopsy-proven malignancy in the left breast extending from the 8:30  o'clock through 1 o'clock positions and measuring up to 7.6 cm in  greatest dimension. Adjacent satellite clumped foci of enhancement are  also noted in the upper outer and lower outer quadrants. Left axillary  adenopathy is also appreciated.  2. There are no findings suspicious for malignancy in the right breast.  Postbreast conservation therapy changes of the right breast are noted.      4/29/2016 - 8/15/2016 Chemotherapy     Neoadjuvant TCHP      9/8/2016 Imaging     MRI breasts:  1. Findings consistent with significant interval response to neoadjuvant  chemotherapy in the left breast. However, there remains some scattered  stippled foci of enhancement that are asymmetric in the left breast  compared to the right breast and they are from the 8 o'clock through 12  o'clock positions in distribution. This corresponds to a region that was  previously occupied by considerable neoplastic disease/malignancy, thus  microscopic multifocal disease is suspected. There has been resolution  of left axillary adenopathy.  2. There are no findings suspicious for malignancy in the right breast.      10/5/2016 Surgery     Lumpectomy with sentinel lymph node biopsy:  Breast:  DCIS spanning 3.3cm.  ER/GA negative.  Multifocal.  No invasive carcinoma  1 of 2 sentinel nodes positive for carcinoma measuring 1.1mm without extracapsular extension.        11/16/2016 Surgery     Left modified radical mastectomy with axillary lymph node sampling by Dr. Cortes.  High grade DCIS with margins <1mm.  10 benign axillary lymph  nodes.      1/9/2017 Imaging     PET scan:  IMPRESSION:  The low-level activity at the left axillary surgical bed may  represent residual postoperative change. Residual malignancy in this  region cannot be entirely excluded. There is otherwise no abnormal  hypermetabolic activity or convincing evidence for metastatic disease.      1/18/2017 - 3/7/2017 Radiation          - 4/21/2017 Chemotherapy     OP BREAST Trastuzumab Q21D (maintenance)              PAST MEDICAL, SURGICAL, FAMILY, AND SOCIAL HISTORIES WERE REVIEWED WITH THE PATIENT AND IN THE ELECTRONIC MEDICAL RECORD, AND WERE UPDATED IF INDICATED.    ALLERGIES:  Allergies   Allergen Reactions   • Nitrofurantoin Anaphylaxis     Rash and difficulty breathing and very faint.   • Celecoxib Nausea Only     See phone message dated 3/2/2018   • Amoxicillin Diarrhea     Severe diarrhea.       MEDICATIONS:  The medication list has been reviewed with the patient by the medical assistant, and the list has been updated in the electronic medical record, which I reviewed.  Medication dosages and frequencies were confirmed to be accurate.    REVIEW OF SYSTEMS:  PAIN:  See Vital Signs below.  GENERAL:  No fevers, chills, night sweats, or unintended weight loss.  SKIN:  Some alopecia on her scalp persists  HEME/LYMPH:  No abnormal bleeding.  No palpable lymphadenopathy.  EYES:  No vision changes or diplopia.  ENT:  No mucositis or ulcers  RESPIRATORY:  No cough, shortness of breath, hemoptysis, or wheezing.  CARDIOVASCULAR:  No chest pain, palpitations, orthopnea, or dyspnea on exertion.  GASTROINTESTINAL: No nausea vomiting diarrhea or constipation  GENITOURINARY:  No dysuria or hematuria.  MUSCULOSKELETAL:  Left ankle pain has overall improved after injections but she feels like she will require surgery at some point in the future.  Right breast tenderness.  Unchanged.  NEUROLOGIC:  Numbness in her hands and feet which is stable  PSYCHIATRIC:  Anxiety improved.    Vitals:     "12/11/19 1409   BP: 153/68   Pulse: 83   Resp: 16   Temp: 98.6 °F (37 °C)   TempSrc: Oral   SpO2: 97%   Weight: 73.9 kg (162 lb 14.4 oz)   Height: 165.3 cm (65.08\")   PainSc: 0-No pain  Comment: breast cancer       PHYSICAL EXAMINATION:  GENERAL:  Well-developed well-nourished female; awake, alert and oriented, in no acute distress.  SKIN: Rash  HEAD:  Normocephalic, atraumatic. Alopecia   EARS:  Hearing intact.  NOSE:  Septum midline.  No excoriations or nasal discharge.  MOUTH:  No stomatitis or ulcers.  Lips are normal.  THROAT:  Oropharynx without lesions or exudates.   LYMPHATICS:  No cervical, supraclavicular, axillary, or inguinal lymphadenopathy.  CHEST:  Lungs are clear to auscultation bilaterally.  No wheezes, rales, or rhonchi.    BREASTS: The left chest and right breast were examined today.  No skin changes or subcutaneous nodules.  Stable postoperative changes in the right breast.  No axillary adenopathy.  No nipple discharge.  The right breast is overall tender.  HEART:  Regular rate; normal rhythm.  No murmurs, gallops or rubs.  ABDOMEN: Not examined today  EXTREMITIES:  No clubbing, cyanosis, or edema.  NEUROLOGICAL:  No focal neurologic deficits.    DIAGNOSTIC DATA:  Results for orders placed or performed in visit on 12/11/19   CBC Auto Differential   Result Value Ref Range    WBC 8.07 3.40 - 10.80 10*3/mm3    RBC 4.10 3.77 - 5.28 10*6/mm3    Hemoglobin 14.3 12.0 - 15.9 g/dL    Hematocrit 41.7 34.0 - 46.6 %    .7 (H) 79.0 - 97.0 fL    MCH 34.9 (H) 26.6 - 33.0 pg    MCHC 34.3 31.5 - 35.7 g/dL    RDW 11.5 (L) 12.3 - 15.4 %    RDW-SD 43.7 37.0 - 54.0 fl    MPV 9.1 6.0 - 12.0 fL    Platelets 243 140 - 450 10*3/mm3    Neutrophil % 67.8 42.7 - 76.0 %    Lymphocyte % 24.0 19.6 - 45.3 %    Monocyte % 6.8 5.0 - 12.0 %    Eosinophil % 0.6 0.3 - 6.2 %    Basophil % 0.4 0.0 - 1.5 %    Immature Grans % 0.4 0.0 - 0.5 %    Neutrophils, Absolute 5.47 1.70 - 7.00 10*3/mm3    Lymphocytes, Absolute 1.94 0.70 - " 3.10 10*3/mm3    Monocytes, Absolute 0.55 0.10 - 0.90 10*3/mm3    Eosinophils, Absolute 0.05 0.00 - 0.40 10*3/mm3    Basophils, Absolute 0.03 0.00 - 0.20 10*3/mm3    Immature Grans, Absolute 0.03 0.00 - 0.05 10*3/mm3    nRBC 0.0 0.0 - 0.2 /100 WBC         Imaging: Right breast mammogram 4/29/2019 BI-RADS Category 2.    ASSESSMENT:  This is a 69 y.o. female with:  1.  History of stage I carcinoma of the right breast.  The biopsy initially showed DCIS.  There was a tiny invasive component on the surgical specimen.  This was minimally hormone receptor positive and HER-2 was not able to be tested.  She had radiation following her right breast lumpectomy but no medical therapy.  2.  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  She completed 6 cycles of neoadjuvant chemotherapy with TCH P followed by a lumpectomy on 10/5/2016.  No residual invasive carcinoma in the breast but there was extensive DCIS with very close margins.  One lymph node was positive for metastatic carcinoma.  We discussed her case at the multidisciplinary breast conference.  She had a mammogram that showed some persistent suspicious calcifications and therefore on 11/16/2016 had a left modified radical mastectomy with axillary lymph node sampling.  High-grade DCIS was present but no invasive carcinoma and no lymph nodes were positive.  Margins were very close.  She had a PET scan that was negative.  She completed radiation therapy.  She completed Herceptin on 4/21/2017.  3.  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  She continues a vitamin B complex.  Stable  4.  Persistent right breast pain: Likely an adverse effect of surgery and radiation.  Imaging reassuring.  5.  Macrocytosis: This is mild and stable.  She continues a vitamin B complex vitamin.  She denies significant alcohol use.  We will monitor.  6.  Alopecia: Secondary to chemotherapy.     PLAN:    1.  Continue a vitamin B complex   3.  I will see her back in 6 months for follow-up with a breast exam and a CBC.

## 2020-01-13 ENCOUNTER — OFFICE VISIT (OUTPATIENT)
Dept: CARDIOLOGY | Facility: CLINIC | Age: 70
End: 2020-01-13

## 2020-01-13 VITALS
HEART RATE: 70 BPM | BODY MASS INDEX: 27.66 KG/M2 | WEIGHT: 166 LBS | SYSTOLIC BLOOD PRESSURE: 136 MMHG | HEIGHT: 65 IN | DIASTOLIC BLOOD PRESSURE: 76 MMHG

## 2020-01-13 DIAGNOSIS — I63.031 CEREBRAL INFARCTION DUE TO THROMBOSIS OF RIGHT CAROTID ARTERY (HCC): ICD-10-CM

## 2020-01-13 DIAGNOSIS — I47.29 NSVT (NONSUSTAINED VENTRICULAR TACHYCARDIA) (HCC): ICD-10-CM

## 2020-01-13 DIAGNOSIS — I49.3 VPC (VENTRICULAR PREMATURE COMPLEX): Primary | ICD-10-CM

## 2020-01-13 DIAGNOSIS — I65.29 CAROTID ATHEROSCLEROSIS, UNSPECIFIED LATERALITY: ICD-10-CM

## 2020-01-13 DIAGNOSIS — C50.411 MALIGNANT NEOPLASM OF UPPER-OUTER QUADRANT OF RIGHT FEMALE BREAST, UNSPECIFIED ESTROGEN RECEPTOR STATUS (HCC): ICD-10-CM

## 2020-01-13 PROCEDURE — 99213 OFFICE O/P EST LOW 20 MIN: CPT | Performed by: INTERNAL MEDICINE

## 2020-01-13 PROCEDURE — 93000 ELECTROCARDIOGRAM COMPLETE: CPT | Performed by: INTERNAL MEDICINE
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
## 2020-01-23 ENCOUNTER — HOSPITAL ENCOUNTER (OUTPATIENT)
Dept: CARDIOLOGY | Facility: HOSPITAL | Age: 70
Discharge: HOME | End: 2020-01-23

## 2020-01-23 ENCOUNTER — HOSPITAL ENCOUNTER (OUTPATIENT)
Dept: CARDIOLOGY | Facility: HOSPITAL | Age: 70
Discharge: HOME | End: 2020-01-23
Admitting: INTERNAL MEDICINE

## 2020-01-23 VITALS
SYSTOLIC BLOOD PRESSURE: 100 MMHG | HEART RATE: 80 BPM | BODY MASS INDEX: 27.66 KG/M2 | DIASTOLIC BLOOD PRESSURE: 64 MMHG | HEIGHT: 65 IN | WEIGHT: 166 LBS

## 2020-01-23 DIAGNOSIS — I63.031 CEREBRAL INFARCTION DUE TO THROMBOSIS OF RIGHT CAROTID ARTERY (HCC): ICD-10-CM

## 2020-01-23 DIAGNOSIS — I65.29 CAROTID ATHEROSCLEROSIS, UNSPECIFIED LATERALITY: ICD-10-CM

## 2020-01-23 DIAGNOSIS — I49.3 VPC (VENTRICULAR PREMATURE COMPLEX): ICD-10-CM

## 2020-01-23 DIAGNOSIS — I47.29 NSVT (NONSUSTAINED VENTRICULAR TACHYCARDIA) (HCC): ICD-10-CM

## 2020-01-23 LAB
BH CV XLRA MEAS LEFT DIST CCA EDV: -17.8 CM/SEC
BH CV XLRA MEAS LEFT DIST CCA PSV: -71.3 CM/SEC
BH CV XLRA MEAS LEFT DIST ICA EDV: -31.6 CM/SEC
BH CV XLRA MEAS LEFT DIST ICA PSV: -108.4 CM/SEC
BH CV XLRA MEAS LEFT ICA/CCA RATIO: 1.5
BH CV XLRA MEAS LEFT MID CCA EDV: -23.3 CM/SEC
BH CV XLRA MEAS LEFT MID CCA PSV: -89.2 CM/SEC
BH CV XLRA MEAS LEFT MID ICA EDV: -35.7 CM/SEC
BH CV XLRA MEAS LEFT MID ICA PSV: -105.6 CM/SEC
BH CV XLRA MEAS LEFT PROX CCA EDV: -31.6 CM/SEC
BH CV XLRA MEAS LEFT PROX CCA PSV: -113.9 CM/SEC
BH CV XLRA MEAS LEFT PROX ECA PSV: -118 CM/SEC
BH CV XLRA MEAS LEFT PROX ICA EDV: -27.4 CM/SEC
BH CV XLRA MEAS LEFT PROX ICA PSV: -70 CM/SEC
BH CV XLRA MEAS LEFT PROX SCLA PSV: 74.6 CM/SEC
BH CV XLRA MEAS LEFT VERTEBRAL A PSV: -46.6 CM/SEC
BH CV XLRA MEAS RIGHT DIST CCA EDV: -25.1 CM/SEC
BH CV XLRA MEAS RIGHT DIST CCA PSV: -79.7 CM/SEC
BH CV XLRA MEAS RIGHT DIST ICA EDV: -42.5 CM/SEC
BH CV XLRA MEAS RIGHT DIST ICA PSV: -156 CM/SEC
BH CV XLRA MEAS RIGHT ICA/CCA RATIO: 1.1
BH CV XLRA MEAS RIGHT MID CCA EDV: -19.9 CM/SEC
BH CV XLRA MEAS RIGHT MID CCA PSV: -110 CM/SEC
BH CV XLRA MEAS RIGHT MID ICA EDV: -21.7 CM/SEC
BH CV XLRA MEAS RIGHT MID ICA PSV: -71.1 CM/SEC
BH CV XLRA MEAS RIGHT PROX CCA EDV: -14.7 CM/SEC
BH CV XLRA MEAS RIGHT PROX CCA PSV: -119.6 CM/SEC
BH CV XLRA MEAS RIGHT PROX ECA PSV: 74.5 CM/SEC
BH CV XLRA MEAS RIGHT PROX ICA EDV: -28.6 CM/SEC
BH CV XLRA MEAS RIGHT PROX ICA PSV: -89.2 CM/SEC
BH CV XLRA MEAS RIGHT PROX SCLA PSV: 127 CM/SEC
BH CV XLRA MEAS RIGHT VERTEBRAL A PSV: -37.3 CM/SEC

## 2020-01-23 PROCEDURE — 93306 TTE W/DOPPLER COMPLETE: CPT | Performed by: INTERNAL MEDICINE

## 2020-01-23 PROCEDURE — 93880 EXTRACRANIAL BILAT STUDY: CPT | Performed by: INTERNAL MEDICINE

## 2020-01-23 PROCEDURE — 93880 EXTRACRANIAL BILAT STUDY: CPT

## 2020-01-23 PROCEDURE — 25010000002 PERFLUTREN (DEFINITY) 8.476 MG IN SODIUM CHLORIDE 0.9 % 10 ML INJECTION: Performed by: INTERNAL MEDICINE

## 2020-01-23 PROCEDURE — 93306 TTE W/DOPPLER COMPLETE: CPT

## 2020-01-23 RX ADMIN — PERFLUTREN 1.5 ML: 6.52 INJECTION, SUSPENSION INTRAVENOUS at 09:10

## 2020-01-24 LAB
AORTIC ARCH: 2.2 CM
AORTIC ROOT ANNULUS: 2.5 CM
ASCENDING AORTA: 2.9 CM
BH CV ECHO MEAS - ACS: 2.1 CM
BH CV ECHO MEAS - AO MAX PG (FULL): 3.7 MMHG
BH CV ECHO MEAS - AO MAX PG: 6.7 MMHG
BH CV ECHO MEAS - AO MEAN PG (FULL): 1.9 MMHG
BH CV ECHO MEAS - AO MEAN PG: 3.5 MMHG
BH CV ECHO MEAS - AO V2 MAX: 129 CM/SEC
BH CV ECHO MEAS - AO V2 MEAN: 88.6 CM/SEC
BH CV ECHO MEAS - AO V2 VTI: 26.7 CM
BH CV ECHO MEAS - ASC AORTA: 2.9 CM
BH CV ECHO MEAS - AVA(I,A): 2.2 CM^2
BH CV ECHO MEAS - AVA(I,D): 2.2 CM^2
BH CV ECHO MEAS - AVA(V,A): 2.1 CM^2
BH CV ECHO MEAS - AVA(V,D): 2.1 CM^2
BH CV ECHO MEAS - BSA(HAYCOCK): 1.9 M^2
BH CV ECHO MEAS - BSA: 1.8 M^2
BH CV ECHO MEAS - BZI_BMI: 27.6 KILOGRAMS/M^2
BH CV ECHO MEAS - BZI_METRIC_HEIGHT: 165.1 CM
BH CV ECHO MEAS - BZI_METRIC_WEIGHT: 75.3 KG
BH CV ECHO MEAS - EDV(MOD-SP2): 65 ML
BH CV ECHO MEAS - EDV(MOD-SP4): 70 ML
BH CV ECHO MEAS - EDV(TEICH): 76.3 ML
BH CV ECHO MEAS - EF(CUBED): 70 %
BH CV ECHO MEAS - EF(MOD-BP): 60 %
BH CV ECHO MEAS - EF(MOD-SP2): 58.5 %
BH CV ECHO MEAS - EF(MOD-SP4): 61.4 %
BH CV ECHO MEAS - EF(TEICH): 62.1 %
BH CV ECHO MEAS - ESV(MOD-SP2): 27 ML
BH CV ECHO MEAS - ESV(MOD-SP4): 27 ML
BH CV ECHO MEAS - ESV(TEICH): 28.9 ML
BH CV ECHO MEAS - FS: 33.1 %
BH CV ECHO MEAS - IVS/LVPW: 1.1
BH CV ECHO MEAS - IVSD: 1.1 CM
BH CV ECHO MEAS - LAT PEAK E' VEL: 7 CM/SEC
BH CV ECHO MEAS - LV DIASTOLIC VOL/BSA (35-75): 38.3 ML/M^2
BH CV ECHO MEAS - LV MASS(C)D: 151.2 GRAMS
BH CV ECHO MEAS - LV MASS(C)DI: 82.8 GRAMS/M^2
BH CV ECHO MEAS - LV MAX PG: 2.9 MMHG
BH CV ECHO MEAS - LV MEAN PG: 1.6 MMHG
BH CV ECHO MEAS - LV SYSTOLIC VOL/BSA (12-30): 14.8 ML/M^2
BH CV ECHO MEAS - LV V1 MAX: 85.3 CM/SEC
BH CV ECHO MEAS - LV V1 MEAN: 61.1 CM/SEC
BH CV ECHO MEAS - LV V1 VTI: 18.5 CM
BH CV ECHO MEAS - LVIDD: 4.1 CM
BH CV ECHO MEAS - LVIDS: 2.8 CM
BH CV ECHO MEAS - LVLD AP2: 6.6 CM
BH CV ECHO MEAS - LVLD AP4: 6.9 CM
BH CV ECHO MEAS - LVLS AP2: 5.7 CM
BH CV ECHO MEAS - LVLS AP4: 5.7 CM
BH CV ECHO MEAS - LVOT AREA (M): 3.1 CM^2
BH CV ECHO MEAS - LVOT AREA: 3.2 CM^2
BH CV ECHO MEAS - LVOT DIAM: 2 CM
BH CV ECHO MEAS - LVPWD: 1 CM
BH CV ECHO MEAS - MED PEAK E' VEL: 6 CM/SEC
BH CV ECHO MEAS - MR MAX PG: 16.4 MMHG
BH CV ECHO MEAS - MR MAX VEL: 202.2 CM/SEC
BH CV ECHO MEAS - MV A DUR: 0.13 SEC
BH CV ECHO MEAS - MV A MAX VEL: 81.8 CM/SEC
BH CV ECHO MEAS - MV DEC SLOPE: 108.9 CM/SEC^2
BH CV ECHO MEAS - MV DEC TIME: 0.37 SEC
BH CV ECHO MEAS - MV E MAX VEL: 39.8 CM/SEC
BH CV ECHO MEAS - MV E/A: 0.49
BH CV ECHO MEAS - MV MAX PG: 3.1 MMHG
BH CV ECHO MEAS - MV MEAN PG: 0.88 MMHG
BH CV ECHO MEAS - MV P1/2T MAX VEL: 40.3 CM/SEC
BH CV ECHO MEAS - MV P1/2T: 108.3 MSEC
BH CV ECHO MEAS - MV V2 MAX: 88.2 CM/SEC
BH CV ECHO MEAS - MV V2 MEAN: 44.5 CM/SEC
BH CV ECHO MEAS - MV V2 VTI: 20.5 CM
BH CV ECHO MEAS - MVA P1/2T LCG: 5.5 CM^2
BH CV ECHO MEAS - MVA(P1/2T): 2 CM^2
BH CV ECHO MEAS - MVA(VTI): 2.9 CM^2
BH CV ECHO MEAS - PA ACC TIME: 0.1 SEC
BH CV ECHO MEAS - PA MAX PG (FULL): 1.4 MMHG
BH CV ECHO MEAS - PA MAX PG: 2.2 MMHG
BH CV ECHO MEAS - PA PR(ACCEL): 36.2 MMHG
BH CV ECHO MEAS - PA V2 MAX: 74.7 CM/SEC
BH CV ECHO MEAS - PULM A REVS DUR: 0.1 SEC
BH CV ECHO MEAS - PULM A REVS VEL: 60.8 CM/SEC
BH CV ECHO MEAS - PULM DIAS VEL: 26.6 CM/SEC
BH CV ECHO MEAS - PULM S/D: 1.5
BH CV ECHO MEAS - PULM SYS VEL: 39 CM/SEC
BH CV ECHO MEAS - PVA(V,A): 2.1 CM^2
BH CV ECHO MEAS - PVA(V,D): 2.1 CM^2
BH CV ECHO MEAS - QP/QS: 0.55
BH CV ECHO MEAS - RAP SYSTOLE: 3 MMHG
BH CV ECHO MEAS - RV MAX PG: 0.79 MMHG
BH CV ECHO MEAS - RV MEAN PG: 0.47 MMHG
BH CV ECHO MEAS - RV V1 MAX: 44.6 CM/SEC
BH CV ECHO MEAS - RV V1 MEAN: 32.9 CM/SEC
BH CV ECHO MEAS - RV V1 VTI: 9.2 CM
BH CV ECHO MEAS - RVOT AREA: 3.6 CM^2
BH CV ECHO MEAS - RVOT DIAM: 2.1 CM
BH CV ECHO MEAS - RVSP: 19.4 MMHG
BH CV ECHO MEAS - SI(CUBED): 27.4 ML/M^2
BH CV ECHO MEAS - SI(LVOT): 32.6 ML/M^2
BH CV ECHO MEAS - SI(MOD-SP2): 20.8 ML/M^2
BH CV ECHO MEAS - SI(MOD-SP4): 23.5 ML/M^2
BH CV ECHO MEAS - SI(TEICH): 25.9 ML/M^2
BH CV ECHO MEAS - SUP REN AO DIAM: 1.7 CM
BH CV ECHO MEAS - SV(CUBED): 50 ML
BH CV ECHO MEAS - SV(LVOT): 59.6 ML
BH CV ECHO MEAS - SV(MOD-SP2): 38 ML
BH CV ECHO MEAS - SV(MOD-SP4): 43 ML
BH CV ECHO MEAS - SV(RVOT): 32.9 ML
BH CV ECHO MEAS - SV(TEICH): 47.4 ML
BH CV ECHO MEAS - TAPSE (>1.6): 2 CM2
BH CV ECHO MEAS - TR MAX VEL: 202.2 CM/SEC
BH CV ECHO MEASUREMENTS AVERAGE E/E' RATIO: 6.12
BH CV XLRA - RV BASE: 2.6 CM
BH CV XLRA - RV LENGTH: 6.3 CM
BH CV XLRA - RV MID: 2 CM
BH CV XLRA - TDI S': 12 CM/SEC
LEFT ATRIUM VOLUME INDEX: 17 ML/M2
MAXIMAL PREDICTED HEART RATE: 153 BPM
SINUS: 2.9 CM
STJ: 2.5 CM
STRESS TARGET HR: 130 BPM

## 2020-02-07 RX ORDER — METOPROLOL SUCCINATE 25 MG/1
TABLET, EXTENDED RELEASE ORAL
Qty: 45 TABLET | Refills: 3 | Status: SHIPPED | OUTPATIENT
Start: 2020-02-07 | End: 2021-02-02

## 2020-05-14 ENCOUNTER — APPOINTMENT (OUTPATIENT)
Dept: WOMENS IMAGING | Facility: HOSPITAL | Age: 70
End: 2020-05-14

## 2020-05-14 PROCEDURE — 77067 SCR MAMMO BI INCL CAD: CPT | Performed by: RADIOLOGY

## 2020-05-14 PROCEDURE — 77063 BREAST TOMOSYNTHESIS BI: CPT | Performed by: RADIOLOGY

## 2020-05-26 ENCOUNTER — TELEPHONE (OUTPATIENT)
Dept: OBSTETRICS AND GYNECOLOGY | Age: 70
End: 2020-05-26

## 2020-05-26 DIAGNOSIS — Z87.39 PERSONAL HISTORY OF OSTEOPOROSIS: ICD-10-CM

## 2020-05-26 RX ORDER — ALENDRONATE SODIUM 70 MG/1
70 TABLET ORAL
Qty: 13 TABLET | Refills: 3 | Status: SHIPPED | OUTPATIENT
Start: 2020-05-26 | End: 2020-05-28 | Stop reason: SDUPTHER

## 2020-05-26 NOTE — TELEPHONE ENCOUNTER
[facility] pt requesting refill on her alendronate 70mg tabs sent to [facility] for 90 day supply    appt with [facility] 06/22/2020

## 2020-05-28 DIAGNOSIS — Z87.39 PERSONAL HISTORY OF OSTEOPOROSIS: ICD-10-CM

## 2020-05-28 RX ORDER — ALENDRONATE SODIUM 70 MG/1
70 TABLET ORAL
Qty: 13 TABLET | Refills: 3 | Status: SHIPPED | OUTPATIENT
Start: 2020-05-28 | End: 2021-05-10

## 2020-05-28 NOTE — TELEPHONE ENCOUNTER
We routed this to [facility] pharmacy and the [facility] is [facility]. Can we change it?

## 2020-06-22 ENCOUNTER — OFFICE VISIT (OUTPATIENT)
Dept: OBSTETRICS AND GYNECOLOGY | Age: 70
End: 2020-06-22

## 2020-06-22 VITALS
WEIGHT: 165.8 LBS | SYSTOLIC BLOOD PRESSURE: 138 MMHG | BODY MASS INDEX: 27.63 KG/M2 | DIASTOLIC BLOOD PRESSURE: 78 MMHG | HEIGHT: 65 IN

## 2020-06-22 DIAGNOSIS — M85.80 OSTEOPENIA, SENILE: ICD-10-CM

## 2020-06-22 DIAGNOSIS — N90.4 LICHEN SCLEROSUS OF FEMALE GENITALIA: ICD-10-CM

## 2020-06-22 DIAGNOSIS — Z01.419 ENCOUNTER FOR GYNECOLOGICAL EXAMINATION WITHOUT ABNORMAL FINDING: Primary | ICD-10-CM

## 2020-06-22 PROCEDURE — G0101 CA SCREEN;PELVIC/BREAST EXAM: HCPCS | Performed by: OBSTETRICS & GYNECOLOGY

## 2020-06-30 ENCOUNTER — OFFICE VISIT (OUTPATIENT)
Dept: ONCOLOGY | Facility: CLINIC | Age: 70
End: 2020-06-30

## 2020-06-30 ENCOUNTER — LAB (OUTPATIENT)
Dept: LAB | Facility: HOSPITAL | Age: 70
End: 2020-06-30

## 2020-06-30 VITALS
RESPIRATION RATE: 16 BRPM | HEIGHT: 64 IN | BODY MASS INDEX: 26.55 KG/M2 | SYSTOLIC BLOOD PRESSURE: 151 MMHG | TEMPERATURE: 97.1 F | HEART RATE: 81 BPM | DIASTOLIC BLOOD PRESSURE: 78 MMHG | OXYGEN SATURATION: 94 % | WEIGHT: 155.5 LBS

## 2020-06-30 DIAGNOSIS — D75.89 MACROCYTOSIS WITHOUT ANEMIA: ICD-10-CM

## 2020-06-30 DIAGNOSIS — Z85.3 HISTORY OF BREAST CANCER: Primary | ICD-10-CM

## 2020-06-30 DIAGNOSIS — Z85.3 HISTORY OF BREAST CANCER: ICD-10-CM

## 2020-06-30 LAB
BASOPHILS # BLD AUTO: 0.04 10*3/MM3 (ref 0–0.2)
BASOPHILS NFR BLD AUTO: 0.5 % (ref 0–1.5)
DEPRECATED RDW RBC AUTO: 42 FL (ref 37–54)
EOSINOPHIL # BLD AUTO: 0.07 10*3/MM3 (ref 0–0.4)
EOSINOPHIL NFR BLD AUTO: 0.8 % (ref 0.3–6.2)
ERYTHROCYTE [DISTWIDTH] IN BLOOD BY AUTOMATED COUNT: 11.5 % (ref 12.3–15.4)
HCT VFR BLD AUTO: 39.8 % (ref 34–46.6)
HGB BLD-MCNC: 13.7 G/DL (ref 12–15.9)
IMM GRANULOCYTES # BLD AUTO: 0.05 10*3/MM3 (ref 0–0.05)
IMM GRANULOCYTES NFR BLD AUTO: 0.6 % (ref 0–0.5)
LYMPHOCYTES # BLD AUTO: 1.92 10*3/MM3 (ref 0.7–3.1)
LYMPHOCYTES NFR BLD AUTO: 23.1 % (ref 19.6–45.3)
MCH RBC QN AUTO: 34.3 PG (ref 26.6–33)
MCHC RBC AUTO-ENTMCNC: 34.4 G/DL (ref 31.5–35.7)
MCV RBC AUTO: 99.5 FL (ref 79–97)
MONOCYTES # BLD AUTO: 0.59 10*3/MM3 (ref 0.1–0.9)
MONOCYTES NFR BLD AUTO: 7.1 % (ref 5–12)
NEUTROPHILS NFR BLD AUTO: 5.63 10*3/MM3 (ref 1.7–7)
NEUTROPHILS NFR BLD AUTO: 67.9 % (ref 42.7–76)
NRBC BLD AUTO-RTO: 0 /100 WBC (ref 0–0.2)
PLATELET # BLD AUTO: 207 10*3/MM3 (ref 140–450)
PMV BLD AUTO: 9.8 FL (ref 6–12)
RBC # BLD AUTO: 4 10*6/MM3 (ref 3.77–5.28)
WBC # BLD AUTO: 8.3 10*3/MM3 (ref 3.4–10.8)

## 2020-06-30 PROCEDURE — 85025 COMPLETE CBC W/AUTO DIFF WBC: CPT

## 2020-06-30 PROCEDURE — 99213 OFFICE O/P EST LOW 20 MIN: CPT | Performed by: INTERNAL MEDICINE

## 2020-06-30 PROCEDURE — 36415 COLL VENOUS BLD VENIPUNCTURE: CPT

## 2020-06-30 NOTE — PROGRESS NOTES
[facility] OUTPATIENT CLINIC FOLLOW UP VISIT    REASON FOR FOLLOW-UP:      Malignant neoplasm of upper-inner quadrant of left female breast    Staging form: Breast, AJCC V7      Clinical stage from 4/14/2016: Stage IIIC (T3, N3b, M0) - Signed by Jordon Bonilla MD on 4/28/2016    Malignant neoplasm of upper-outer quadrant of right female breast    Staging form: Breast, AJCC V7      Clinical stage from 3/25/2016: Stage IA (rT1a, N0, M0) - Signed by Erika Osborn MD on 3/25/2016    HISTORY OF PRESENT ILLNESS:  Yuli Huang is a 69 y.o. female who returns today for follow up of the above issue.     She feels well at this point.  Good energy level.  She denies any new problems with her breasts.  She saw a new OB/GYN recently and was encouraged to get some genetic testing done to evaluate for cause of her breast cancer.  This was done and the result pending at this time.  She also recently had a breast examination at that time.      ONCOLOGIC HISTORY:     Malignant neoplasm of upper-outer quadrant of right female breast (CMS/HCC)    5/13/2013 Initial Diagnosis         5/13/2013 Biopsy     Biopsy:  Ductal carcinoma in situ.  ER 20%, NC 1-4%.  Her2 not done.      6/4/2013 Surgery     Lumpectomy by Dr. Denis Cortes.  2mm invasive cancer associated with DCIS.  ER/NC negative on the DCIS; unable to be performed on the invasive component.        6/18/2013 Surgery     [place] lymph node biopsy:  3 nodes negative.      7/10/2013 - 8/23/2013 Radiation     Radiation therapy to the right breast.        Malignant neoplasm of upper-inner quadrant of left female breast (CMS/HCC)    3/31/2016 Imaging     Ultrasound left breast:  10 o'clock position, 3x2cm mass with a 2cm axillary lymph node.      4/7/2016 Biopsy     Ultrasound-guided biopsy of the left breast and axillary lymph node.  ER/NC negative, HER-2 overexpressed.  Grade 2.        4/19/2016 Imaging     PET scan:  Left breast mass demonstrate significant  metabolic activity.  There is a single 1.7 cm lymph node at the left axilla which  demonstrates significant metabolic activity for its size. There is also  a subcentimeter node in the left internal mammary chain which  nonetheless demonstrates discernible activity. I suspect an early erasto  metastasis. No further evidence for metastatic disease is present.      4/21/2016 Imaging     Breast MRI:  1. Biopsy-proven malignancy in the left breast extending from the 8:30  o'clock through 1 o'clock positions and measuring up to 7.6 cm in  greatest dimension. Adjacent satellite clumped foci of enhancement are  also noted in the upper outer and lower outer quadrants. Left axillary  adenopathy is also appreciated.  2. There are no findings suspicious for malignancy in the right breast.  Postbreast conservation therapy changes of the right breast are noted.      4/29/2016 - 8/15/2016 Chemotherapy     Neoadjuvant TCHP      9/8/2016 Imaging     MRI breasts:  1. Findings consistent with significant interval response to neoadjuvant  chemotherapy in the left breast. However, there remains some scattered  stippled foci of enhancement that are asymmetric in the left breast  compared to the right breast and they are from the 8 o'clock through 12  o'clock positions in distribution. This corresponds to a region that was  previously occupied by considerable neoplastic disease/malignancy, thus  microscopic multifocal disease is suspected. There has been resolution  of left axillary adenopathy.  2. There are no findings suspicious for malignancy in the right breast.      10/5/2016 Surgery     Lumpectomy with sentinel lymph node biopsy:  Breast:  DCIS spanning 3.3cm.  ER/UT negative.  Multifocal.  No invasive carcinoma  1 of 2 sentinel nodes positive for carcinoma measuring 1.1mm without extracapsular extension.        11/16/2016 Surgery     Left modified radical mastectomy with axillary lymph node sampling by Dr. Cortes.  High grade DCIS  with margins <1mm.  10 benign axillary lymph nodes.      1/9/2017 Imaging     PET scan:  IMPRESSION:  The low-level activity at the left axillary surgical bed may  represent residual postoperative change. Residual malignancy in this  region cannot be entirely excluded. There is otherwise no abnormal  hypermetabolic activity or convincing evidence for metastatic disease.      1/18/2017 - 3/7/2017 Radiation          - 4/21/2017 Chemotherapy     OP BREAST Trastuzumab Q21D (maintenance)              PAST MEDICAL, SURGICAL, FAMILY, AND SOCIAL HISTORIES WERE REVIEWED WITH THE PATIENT AND IN THE ELECTRONIC MEDICAL RECORD, AND WERE UPDATED IF INDICATED.    ALLERGIES:  Allergies   Allergen Reactions   • Nitrofurantoin Anaphylaxis     Rash and difficulty breathing and very faint.   • Celecoxib Nausea Only     See phone message dated 3/2/2018   • Amoxicillin Diarrhea     Severe diarrhea.       MEDICATIONS:  The medication list has been reviewed with the patient by the medical assistant, and the list has been updated in the electronic medical record, which I reviewed.  Medication dosages and frequencies were confirmed to be accurate.    REVIEW OF SYSTEMS:  PAIN:  See Vital Signs below.  GENERAL:  No fevers, chills, night sweats, or unintended weight loss.  SKIN:  Some alopecia on her scalp persists  HEME/LYMPH:  No abnormal bleeding.  No palpable lymphadenopathy.  EYES:  No vision changes or diplopia.  ENT:  No mucositis or ulcers  RESPIRATORY:  No cough, shortness of breath, hemoptysis, or wheezing.  CARDIOVASCULAR:  No chest pain, palpitations, orthopnea, or dyspnea on exertion.  GASTROINTESTINAL: No nausea vomiting diarrhea or constipation  GENITOURINARY:  No dysuria or hematuria.  MUSCULOSKELETAL: She did not complain of ankle pain today.  She did not complain of any right breast tenderness today.  NEUROLOGIC:  Numbness in her hands and feet which is stable  PSYCHIATRIC:  Anxiety improved.    Vitals:    06/30/20 1412   BP:  "151/78   Pulse: 81   Resp: 16   Temp: 97.1 °F (36.2 °C)   TempSrc: Oral   SpO2: 94%   Weight: 70.5 kg (155 lb 8 oz)   Height: 163.5 cm (64.37\")   PainSc: 0-No pain  Comment: breast cancer       PHYSICAL EXAMINATION:  GENERAL:  Well-developed well-nourished female; awake, alert and oriented, in no acute distress.  Wearing a mask.  SKIN: No rash  HEAD:  Normocephalic, atraumatic. Alopecia   EARS:  Hearing intact.  CHEST: Normal respiratory effort  BREASTS: Not examined today  EXTREMITIES:  No clubbing, cyanosis, or edema.  NEUROLOGICAL:  No focal neurologic deficits.    DIAGNOSTIC DATA:  Results for orders placed or performed in visit on 06/30/20   CBC Auto Differential   Result Value Ref Range    WBC 8.30 3.40 - 10.80 10*3/mm3    RBC 4.00 3.77 - 5.28 10*6/mm3    Hemoglobin 13.7 12.0 - 15.9 g/dL    Hematocrit 39.8 34.0 - 46.6 %    MCV 99.5 (H) 79.0 - 97.0 fL    MCH 34.3 (H) 26.6 - 33.0 pg    MCHC 34.4 31.5 - 35.7 g/dL    RDW 11.5 (L) 12.3 - 15.4 %    RDW-SD 42.0 37.0 - 54.0 fl    MPV 9.8 6.0 - 12.0 fL    Platelets 207 140 - 450 10*3/mm3    Neutrophil % 67.9 42.7 - 76.0 %    Lymphocyte % 23.1 19.6 - 45.3 %    Monocyte % 7.1 5.0 - 12.0 %    Eosinophil % 0.8 0.3 - 6.2 %    Basophil % 0.5 0.0 - 1.5 %    Immature Grans % 0.6 (H) 0.0 - 0.5 %    Neutrophils, Absolute 5.63 1.70 - 7.00 10*3/mm3    Lymphocytes, Absolute 1.92 0.70 - 3.10 10*3/mm3    Monocytes, Absolute 0.59 0.10 - 0.90 10*3/mm3    Eosinophils, Absolute 0.07 0.00 - 0.40 10*3/mm3    Basophils, Absolute 0.04 0.00 - 0.20 10*3/mm3    Immature Grans, Absolute 0.05 0.00 - 0.05 10*3/mm3    nRBC 0.0 0.0 - 0.2 /100 WBC         Imaging: Right breast mammogram 5/14/2020 BI-RADS Category 2.    ASSESSMENT:  This is a 69 y.o. female with:  1.  History of stage I carcinoma of the right breast.  The biopsy initially showed DCIS.  There was a tiny invasive component on the surgical specimen.  This was minimally hormone receptor positive and HER-2 was not able to be tested.  She " had radiation following her right breast lumpectomy but no medical therapy.  Requires mammograms of the right breast.  Last on 5/14/2020.  2.  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  She completed 6 cycles of neoadjuvant chemotherapy with TCH P followed by a lumpectomy on 10/5/2016.  No residual invasive carcinoma in the breast but there was extensive DCIS with very close margins.  One lymph node was positive for metastatic carcinoma.  We discussed her case at the multidisciplinary breast conference.  She had a mammogram that showed some persistent suspicious calcifications and therefore on 11/16/2016 had a left modified radical mastectomy with axillary lymph node sampling.  High-grade DCIS was present but no invasive carcinoma and no lymph nodes were positive.  Margins were very close.  She had a PET scan that was negative.  She completed radiation therapy.  She completed Herceptin on 4/21/2017.  3.  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  She continues a vitamin B complex.  Stable  4.  Persistent right breast pain: Likely an adverse effect of surgery and radiation.  Imaging reassuring.  5.  Macrocytosis: This is mild and stable.  She continues a vitamin B complex vitamin.  She denies significant alcohol use.  We will monitor.  MCV better today.  6.  Alopecia: Secondary to chemotherapy.  Overall improved.    PLAN:   1.  Continue a vitamin B complex   2.  I will see her back in 6 months for follow-up with a breast exam and a CBC.  3.  She did get genetic testing done for hereditary breast cancer syndromes with the result pending.  4.  I advised her to call us with any new problems prior to her scheduled follow-up.

## 2020-09-03 DIAGNOSIS — C50.411 MALIGNANT NEOPLASM OF UPPER-OUTER QUADRANT OF RIGHT FEMALE BREAST, UNSPECIFIED ESTROGEN RECEPTOR STATUS (HCC): Primary | ICD-10-CM

## 2020-09-10 ENCOUNTER — TELEPHONE (OUTPATIENT)
Dept: OBSTETRICS AND GYNECOLOGY | Age: 70
End: 2020-09-10

## 2020-09-10 NOTE — TELEPHONE ENCOUNTER
Luda kent called stating she received the wrong ICD 10code. Bra code     Please advise     Luda- [phone]

## 2020-09-10 NOTE — TELEPHONE ENCOUNTER
Please call this Luda and write down exactly what she wants on a prescription pad and I will sign it tomorrow

## 2020-10-29 ENCOUNTER — TELEPHONE (OUTPATIENT)
Dept: OBSTETRICS AND GYNECOLOGY | Age: 70
End: 2020-10-29

## 2020-10-29 DIAGNOSIS — L90.0 LICHEN SCLEROSUS ET ATROPHICUS: ICD-10-CM

## 2020-10-29 NOTE — TELEPHONE ENCOUNTER
Patient called,  Would like to have betamethasone valerate cream [facility], [facility] is [facility]

## 2020-12-15 ENCOUNTER — OFFICE VISIT (OUTPATIENT)
Dept: ONCOLOGY | Facility: CLINIC | Age: 70
End: 2020-12-15

## 2020-12-15 ENCOUNTER — LAB (OUTPATIENT)
Dept: LAB | Facility: HOSPITAL | Age: 70
End: 2020-12-15

## 2020-12-15 VITALS
OXYGEN SATURATION: 96 % | HEIGHT: 64 IN | WEIGHT: 170.6 LBS | SYSTOLIC BLOOD PRESSURE: 140 MMHG | BODY MASS INDEX: 29.12 KG/M2 | DIASTOLIC BLOOD PRESSURE: 73 MMHG | HEART RATE: 89 BPM | RESPIRATION RATE: 16 BRPM | TEMPERATURE: 97.3 F

## 2020-12-15 DIAGNOSIS — E78.5 HYPERLIPIDEMIA, UNSPECIFIED HYPERLIPIDEMIA TYPE: Primary | ICD-10-CM

## 2020-12-15 DIAGNOSIS — D75.89 MACROCYTOSIS WITHOUT ANEMIA: ICD-10-CM

## 2020-12-15 DIAGNOSIS — Z85.3 HISTORY OF BREAST CANCER: Primary | ICD-10-CM

## 2020-12-15 LAB
BASOPHILS # BLD AUTO: 0.04 10*3/MM3 (ref 0–0.2)
BASOPHILS NFR BLD AUTO: 0.6 % (ref 0–1.5)
DEPRECATED RDW RBC AUTO: 42.2 FL (ref 37–54)
EOSINOPHIL # BLD AUTO: 0.12 10*3/MM3 (ref 0–0.4)
EOSINOPHIL NFR BLD AUTO: 1.7 % (ref 0.3–6.2)
ERYTHROCYTE [DISTWIDTH] IN BLOOD BY AUTOMATED COUNT: 11.7 % (ref 12.3–15.4)
HCT VFR BLD AUTO: 41.8 % (ref 34–46.6)
HGB BLD-MCNC: 14.4 G/DL (ref 12–15.9)
IMM GRANULOCYTES # BLD AUTO: 0.04 10*3/MM3 (ref 0–0.05)
IMM GRANULOCYTES NFR BLD AUTO: 0.6 % (ref 0–0.5)
LYMPHOCYTES # BLD AUTO: 1.9 10*3/MM3 (ref 0.7–3.1)
LYMPHOCYTES NFR BLD AUTO: 26.6 % (ref 19.6–45.3)
MCH RBC QN AUTO: 34 PG (ref 26.6–33)
MCHC RBC AUTO-ENTMCNC: 34.4 G/DL (ref 31.5–35.7)
MCV RBC AUTO: 98.6 FL (ref 79–97)
MONOCYTES # BLD AUTO: 0.48 10*3/MM3 (ref 0.1–0.9)
MONOCYTES NFR BLD AUTO: 6.7 % (ref 5–12)
NEUTROPHILS NFR BLD AUTO: 4.55 10*3/MM3 (ref 1.7–7)
NEUTROPHILS NFR BLD AUTO: 63.8 % (ref 42.7–76)
NRBC BLD AUTO-RTO: 0 /100 WBC (ref 0–0.2)
PLATELET # BLD AUTO: 266 10*3/MM3 (ref 140–450)
PMV BLD AUTO: 9.9 FL (ref 6–12)
RBC # BLD AUTO: 4.24 10*6/MM3 (ref 3.77–5.28)
WBC # BLD AUTO: 7.13 10*3/MM3 (ref 3.4–10.8)

## 2020-12-15 PROCEDURE — 36415 COLL VENOUS BLD VENIPUNCTURE: CPT

## 2020-12-15 PROCEDURE — 99213 OFFICE O/P EST LOW 20 MIN: CPT | Performed by: INTERNAL MEDICINE

## 2020-12-15 PROCEDURE — 85025 COMPLETE CBC W/AUTO DIFF WBC: CPT

## 2020-12-15 NOTE — PROGRESS NOTES
[facility] GROUP OUTPATIENT CLINIC FOLLOW UP VISIT    REASON FOR FOLLOW-UP:      Malignant neoplasm of upper-inner quadrant of left female breast    Staging form: Breast, AJCC V7      Clinical stage from 4/14/2016: Stage IIIC (T3, N3b, M0) - Signed by Jordon Bonilla MD on 4/28/2016    Malignant neoplasm of upper-outer quadrant of right female breast    Staging form: Breast, AJCC V7      Clinical stage from 3/25/2016: Stage IA (rT1a, N0, M0) - Signed by Erika Osborn MD on 3/25/2016    HISTORY OF PRESENT ILLNESS:  Yuli Huang is a 70 y.o. female who returns today for follow up of the above issue.     She had genetic testing (Invtae) that was negative.    She continues to feel well.  She has very subtle neuropathy in her hands and feet which is not really bothering her at this point.  Energy level is excellent.  Alopecia slowly improving with a change in shampoos.      ONCOLOGIC HISTORY:  Oncology/Hematology History   Malignant neoplasm of upper-outer quadrant of right female breast (CMS/HCC)   5/13/2013 Initial Diagnosis       5/13/2013 Biopsy    Biopsy:  Ductal carcinoma in situ.  ER 20%, RI 1-4%.  Her2 not done.     6/4/2013 Surgery    Lumpectomy by Dr. Denis Cortes.  2mm invasive cancer associated with DCIS.  ER/RI negative on the DCIS; unable to be performed on the invasive component.       6/18/2013 Surgery    [place] lymph node biopsy:  3 nodes negative.     7/10/2013 - 8/23/2013 Radiation    Radiation therapy to the right breast.     Malignant neoplasm of upper-inner quadrant of left female breast (CMS/HCC)   3/31/2016 Imaging    Ultrasound left breast:  10 o'clock position, 3x2cm mass with a 2cm axillary lymph node.     4/7/2016 Biopsy    Ultrasound-guided biopsy of the left breast and axillary lymph node.  ER/RI negative, HER-2 overexpressed.  Grade 2.       4/19/2016 Imaging    PET scan:  Left breast mass demonstrate significant metabolic activity.  There is a single 1.7 cm lymph node at  the left axilla which  demonstrates significant metabolic activity for its size. There is also  a subcentimeter node in the left internal mammary chain which  nonetheless demonstrates discernible activity. I suspect an early erasto  metastasis. No further evidence for metastatic disease is present.     4/21/2016 Imaging    Breast MRI:  1. Biopsy-proven malignancy in the left breast extending from the 8:30  o'clock through 1 o'clock positions and measuring up to 7.6 cm in  greatest dimension. Adjacent satellite clumped foci of enhancement are  also noted in the upper outer and lower outer quadrants. Left axillary  adenopathy is also appreciated.  2. There are no findings suspicious for malignancy in the right breast.  Postbreast conservation therapy changes of the right breast are noted.     4/29/2016 - 8/15/2016 Chemotherapy    Neoadjuvant TCHP     9/8/2016 Imaging    MRI breasts:  1. Findings consistent with significant interval response to neoadjuvant  chemotherapy in the left breast. However, there remains some scattered  stippled foci of enhancement that are asymmetric in the left breast  compared to the right breast and they are from the 8 o'clock through 12  o'clock positions in distribution. This corresponds to a region that was  previously occupied by considerable neoplastic disease/malignancy, thus  microscopic multifocal disease is suspected. There has been resolution  of left axillary adenopathy.  2. There are no findings suspicious for malignancy in the right breast.     10/5/2016 Surgery    Lumpectomy with sentinel lymph node biopsy:  Breast:  DCIS spanning 3.3cm.  ER/VA negative.  Multifocal.  No invasive carcinoma  1 of 2 sentinel nodes positive for carcinoma measuring 1.1mm without extracapsular extension.       11/16/2016 Surgery    Left modified radical mastectomy with axillary lymph node sampling by Dr. Cortes.  High grade DCIS with margins <1mm.  10 benign axillary lymph nodes.     1/9/2017 Imaging     PET scan:  IMPRESSION:  The low-level activity at the left axillary surgical bed may  represent residual postoperative change. Residual malignancy in this  region cannot be entirely excluded. There is otherwise no abnormal  hypermetabolic activity or convincing evidence for metastatic disease.     1/18/2017 - 3/7/2017 Radiation        - 4/21/2017 Chemotherapy    OP BREAST Trastuzumab Q21D (maintenance)              PAST MEDICAL, SURGICAL, FAMILY, AND SOCIAL HISTORIES WERE REVIEWED WITH THE PATIENT AND IN THE ELECTRONIC MEDICAL RECORD, AND WERE UPDATED IF INDICATED.    ALLERGIES:  Allergies   Allergen Reactions   • Nitrofurantoin Anaphylaxis     Rash and difficulty breathing and very faint.   • Celecoxib Nausea Only     See phone message dated 3/2/2018   • Amoxicillin Diarrhea     Severe diarrhea.       MEDICATIONS:  The medication list has been reviewed with the patient by the medical assistant, and the list has been updated in the electronic medical record, which I reviewed.  Medication dosages and frequencies were confirmed to be accurate.    REVIEW OF SYSTEMS:  PAIN:  See Vital Signs below.  GENERAL:  No fevers, chills, night sweats, or unintended weight loss.  SKIN:  Some alopecia on her scalp persists although this is improving.  HEME/LYMPH:  No abnormal bleeding.  No palpable lymphadenopathy.  EYES:  No vision changes or diplopia.  ENT:  No mucositis or ulcers  RESPIRATORY:  No cough, shortness of breath, hemoptysis, or wheezing.  CARDIOVASCULAR:  No chest pain, palpitations, orthopnea, or dyspnea on exertion.  GASTROINTESTINAL: No nausea vomiting diarrhea or constipation  GENITOURINARY:  No dysuria or hematuria.  MUSCULOSKELETAL: Mild right breast tenderness.  She did not complain of ankle pain.  NEUROLOGIC: Very subtle numbness in her hands and feet, overall improved  PSYCHIATRIC:  Anxiety improved.  Reviewed 12/15/20      Vitals:    12/15/20 1252   BP: 140/73   Pulse: 89   Resp: 16   Temp: 97.3 °F (36.3  "°C)   TempSrc: Skin   SpO2: 96%   Weight: 77.4 kg (170 lb 9.6 oz)   Height: 163.5 cm (64.37\")   PainSc: 0-No pain       PHYSICAL EXAMINATION:  GENERAL:  Well-developed well-nourished female; awake, alert and oriented, in no acute distress.  Wearing a mask.  SKIN: No rash  HEAD:  Normocephalic, atraumatic. Alopecia   EARS:  Hearing intact.  CHEST: Normal respiratory effort  BREASTS: The left chest wall was examined today.  No nodules.  The right breast was examined today.  Well-healed surgical incisions in the left upper quadrant.  No axillary adenopathy.  No nodules or nipple discharge.  EXTREMITIES:  No clubbing, cyanosis, or edema.  NEUROLOGICAL:  No focal neurologic deficits.    DIAGNOSTIC DATA:  Results for orders placed or performed in visit on 12/15/20   CBC Auto Differential    Specimen: Blood   Result Value Ref Range    WBC 7.13 3.40 - 10.80 10*3/mm3    RBC 4.24 3.77 - 5.28 10*6/mm3    Hemoglobin 14.4 12.0 - 15.9 g/dL    Hematocrit 41.8 34.0 - 46.6 %    MCV 98.6 (H) 79.0 - 97.0 fL    MCH 34.0 (H) 26.6 - 33.0 pg    MCHC 34.4 31.5 - 35.7 g/dL    RDW 11.7 (L) 12.3 - 15.4 %    RDW-SD 42.2 37.0 - 54.0 fl    MPV 9.9 6.0 - 12.0 fL    Platelets 266 140 - 450 10*3/mm3    Neutrophil % 63.8 42.7 - 76.0 %    Lymphocyte % 26.6 19.6 - 45.3 %    Monocyte % 6.7 5.0 - 12.0 %    Eosinophil % 1.7 0.3 - 6.2 %    Basophil % 0.6 0.0 - 1.5 %    Immature Grans % 0.6 (H) 0.0 - 0.5 %    Neutrophils, Absolute 4.55 1.70 - 7.00 10*3/mm3    Lymphocytes, Absolute 1.90 0.70 - 3.10 10*3/mm3    Monocytes, Absolute 0.48 0.10 - 0.90 10*3/mm3    Eosinophils, Absolute 0.12 0.00 - 0.40 10*3/mm3    Basophils, Absolute 0.04 0.00 - 0.20 10*3/mm3    Immature Grans, Absolute 0.04 0.00 - 0.05 10*3/mm3    nRBC 0.0 0.0 - 0.2 /100 WBC         Imaging: Right breast mammogram 5/14/2020 BI-RADS Category 2.    ASSESSMENT:  This is a 70 y.o. female with:  1.  History of stage I carcinoma of the right breast.  The biopsy initially showed DCIS.  There was a tiny " invasive component on the surgical specimen.  This was minimally hormone receptor positive and HER-2 was not able to be tested.  She had radiation following her right breast lumpectomy but no medical therapy.  Requires mammograms of the right breast.  Last on 5/14/2020.  2.  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  She completed 6 cycles of neoadjuvant chemotherapy with TCH P followed by a lumpectomy on 10/5/2016.  No residual invasive carcinoma in the breast but there was extensive DCIS with very close margins.  One lymph node was positive for metastatic carcinoma.  We discussed her case at the multidisciplinary breast conference.  She had a mammogram that showed some persistent suspicious calcifications and therefore on 11/16/2016 had a left modified radical mastectomy with axillary lymph node sampling.  High-grade DCIS was present but no invasive carcinoma and no lymph nodes were positive.  Margins were very close.  She had a PET scan that was negative.  She completed radiation therapy.  She completed Herceptin on 4/21/2017.  3.  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  She continues a vitamin B complex.  Overall improved.  4.  Persistent right breast pain: Likely an adverse effect of surgery and radiation.  Imaging reassuring.  She did not really complain about this today much.  5.  Macrocytosis: This is mild and stable.  She continues a vitamin B complex vitamin.  She denies significant alcohol use.  We will monitor.  MCV better again today.  6.  Alopecia: Secondary to chemotherapy.  Overall improved.  Improving still with topical therapy.  7. Genetic testing (Invitae) negative.    PLAN:   1.  Continue a vitamin B complex   2.  I will see her back in 6 months for follow-up with a CBC.  3.  Right breast mammogram will be due in May 2020.  She will have a breast exam by her gynecologist around that time as  well.

## 2021-02-02 RX ORDER — METOPROLOL SUCCINATE 25 MG/1
TABLET, EXTENDED RELEASE ORAL
Qty: 45 TABLET | Refills: 3 | Status: SHIPPED | OUTPATIENT
Start: 2021-02-02 | End: 2022-02-09

## 2021-02-10 ENCOUNTER — OFFICE VISIT (OUTPATIENT)
Dept: CARDIOLOGY | Facility: CLINIC | Age: 71
End: 2021-02-10

## 2021-02-10 VITALS
WEIGHT: 166.8 LBS | HEART RATE: 74 BPM | HEIGHT: 64 IN | DIASTOLIC BLOOD PRESSURE: 98 MMHG | SYSTOLIC BLOOD PRESSURE: 148 MMHG | BODY MASS INDEX: 28.48 KG/M2

## 2021-02-10 DIAGNOSIS — I49.3 PVC'S (PREMATURE VENTRICULAR CONTRACTIONS): Primary | ICD-10-CM

## 2021-02-10 DIAGNOSIS — R03.0 TRANSIENT ELEVATED BLOOD PRESSURE: ICD-10-CM

## 2021-02-10 DIAGNOSIS — I65.29 CAROTID ATHEROSCLEROSIS, UNSPECIFIED LATERALITY: ICD-10-CM

## 2021-02-10 DIAGNOSIS — I47.29 NSVT (NONSUSTAINED VENTRICULAR TACHYCARDIA) (HCC): ICD-10-CM

## 2021-02-10 DIAGNOSIS — C50.411 MALIGNANT NEOPLASM OF UPPER-OUTER QUADRANT OF RIGHT FEMALE BREAST, UNSPECIFIED ESTROGEN RECEPTOR STATUS (HCC): ICD-10-CM

## 2021-02-10 PROCEDURE — 99214 OFFICE O/P EST MOD 30 MIN: CPT | Performed by: NURSE PRACTITIONER

## 2021-02-10 PROCEDURE — 93000 ELECTROCARDIOGRAM COMPLETE: CPT | Performed by: NURSE PRACTITIONER
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
## 2021-02-11 PROBLEM — I49.3 PVC'S (PREMATURE VENTRICULAR CONTRACTIONS): Status: ACTIVE | Noted: 2021-02-11

## 2021-03-16 ENCOUNTER — BULK ORDERING (OUTPATIENT)
Dept: CASE MANAGEMENT | Facility: OTHER | Age: 71
End: 2021-03-16

## 2021-03-16 DIAGNOSIS — Z23 IMMUNIZATION DUE: ICD-10-CM

## 2021-05-08 DIAGNOSIS — Z87.39 PERSONAL HISTORY OF OSTEOPOROSIS: ICD-10-CM

## 2021-05-10 RX ORDER — ALENDRONATE SODIUM 70 MG/1
TABLET ORAL
Qty: 12 TABLET | Refills: 3 | Status: SHIPPED | OUTPATIENT
Start: 2021-05-10 | End: 2022-05-09

## 2021-05-17 ENCOUNTER — APPOINTMENT (OUTPATIENT)
Dept: WOMENS IMAGING | Facility: HOSPITAL | Age: 71
End: 2021-05-17

## 2021-05-17 PROCEDURE — 77067 SCR MAMMO BI INCL CAD: CPT | Performed by: RADIOLOGY

## 2021-05-17 PROCEDURE — 77063 BREAST TOMOSYNTHESIS BI: CPT | Performed by: RADIOLOGY

## 2021-06-21 NOTE — PROGRESS NOTES
"[facility] GROUP OUTPATIENT CLINIC FOLLOW UP VISIT    REASON FOR FOLLOW-UP:      Malignant neoplasm of upper-inner quadrant of left female breast    Staging form: Breast, AJCC V7      Clinical stage from 2016: Stage IIIC (T3, N3b, M0) - Signed by Jordon Bonilla MD on 2016    Malignant neoplasm of upper-outer quadrant of right female breast    Staging form: Breast, AJCC V7      Clinical stage from 3/25/2016: Stage IA (rT1a, N0, M0) - Signed by Erika Osborn MD on 3/25/2016    HISTORY OF PRESENT ILLNESS:  Yuli Huang is a 70 y.o. female who returns today for follow up of the above issue.     Overall she has been doing well.  She denies any new problems with her breast or chest wall.  No new nodules or skin changes.  She will see gynecology in the next couple of days for a physical exam and she just had a mammogram that looked okay.    She has been anxious over the past few days as her  granddaughter is hospitalized with thermal dysregulation and possible meningitis.      REVIEW OF SYSTEMS:  PAIN:  See Vital Signs below.  GENERAL:  No fevers, chills, night sweats, or unintended weight loss.  SKIN:  Some alopecia on her scalp persists but improving  HEME/LYMPH:  No abnormal bleeding.  No palpable lymphadenopathy.  EYES:  No vision changes or diplopia.  ENT:  No mucositis or ulcers  RESPIRATORY:  No cough, shortness of breath, hemoptysis, or wheezing.  CARDIOVASCULAR:  No chest pain, palpitations, orthopnea, or dyspnea on exertion.  GASTROINTESTINAL: No nausea vomiting diarrhea or constipation  GENITOURINARY:  No dysuria or hematuria.  NEUROLOGIC: Very subtle numbness in her hands and feet, overall improved  PSYCHIATRIC:  Anxiety had overall improved.  Reviewed 21      Vitals:    21 1343   BP: 142/66   Pulse: 64   Resp: 16   Temp: 97.3 °F (36.3 °C)   TempSrc: Temporal   SpO2: 97%   Weight: 76.6 kg (168 lb 14.4 oz)   Height: 163.5 cm (64.37\")   PainSc: 0-No pain  Comment: " breast cancer       PHYSICAL EXAMINATION:  GENERAL:  Well-developed well-nourished female; awake, alert and oriented, in no acute distress.  Wearing a face mask.  SKIN: No rash  HEAD:  Normocephalic, atraumatic. Alopecia   EARS:  Hearing intact.  CHEST: Normal respiratory effort.  Lungs clear to auscultation bilaterally.  Heart: Regular rate and rhythm without murmurs gallops or rubs  Breast and chest wall not examined today  EXTREMITIES:  No clubbing, cyanosis, or edema.  NEUROLOGICAL:  No focal neurologic deficits.    DIAGNOSTIC DATA:  CBC & Differential (06/22/2021 13:34)      Mammogram 5/17/2021 BI-RADS Category 2    SCANNED - MAMMO (05/17/2021)      ASSESSMENT:  This is a 70 y.o. female with:    *History of stage I carcinoma of the right breast.    · The biopsy initially showed DCIS.  There was a tiny invasive component on the surgical specimen.  This was minimally hormone receptor positive and HER-2 was not able to be tested.  She had radiation following her right breast lumpectomy but no medical therapy.  Requires mammograms of the right breast.  Last on 5/17/2021.    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    · She completed 6 cycles of neoadjuvant chemotherapy with TCH P followed by a lumpectomy on 10/5/2016.    · No residual invasive carcinoma in the breast but there was extensive DCIS with very close margins.    · One lymph node was positive for metastatic carcinoma.    · We discussed her case at the multidisciplinary breast conference.    · She had a mammogram that showed some persistent suspicious calcifications and therefore on 11/16/2016 had a left modified radical mastectomy with axillary lymph node sampling.    · High-grade DCIS was present but no invasive carcinoma and no lymph nodes were positive.  Margins were very close.    · She had a PET scan that was negative.    · She completed radiation therapy.    · She completed  Herceptin on 4/21/2017.    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  She continues a vitamin B complex.  Overall improved.    *Persistent right breast pain: Likely an adverse effect of surgery and radiation.  Imaging reassuring.  She did not really complain about this today much.    *Macrocytosis: This is mild and stable.  She continues a vitamin B complex vitamin.  She denies significant alcohol use.  We will monitor.  MCV is a little higher today at 101.2, up from 98.6.    *Alopecia: Secondary to chemotherapy.  Overall improved.     *Genetic testing ([facility]) negative.    PLAN:  1. Laboratory evaluation for macrocytosis as outlined below.  We will notify her of abnormal results.  2. Continue her vitamin B complex vitamin  3. Follow-up in 6 months with a CBC  4. Follow-up with gynecology for breast exam  5. And annual right breast mammogram will be due in May 2022    Orders Placed This Encounter   Procedures   • Folate RBC     Order Specific Question:   Release to patient     Answer:   Immediate   • Comprehensive Metabolic Panel     Order Specific Question:   Release to patient     Answer:   Immediate   • Vitamin B12     Order Specific Question:   Release to patient     Answer:   Immediate   • Protein Electrophoresis, Total     Order Specific Question:   Release to patient     Answer:   Immediate   • Immunofixation, Serum     Order Specific Question:   Release to patient     Answer:   Immediate   • TSH     Order Specific Question:   Release to patient     Answer:   Immediate   • T4, Free     Order Specific Question:   Release to patient     Answer:   Immediate   • Retic With IRF & RET-He     Order Specific Question:   Release to patient     Answer:   Immediate   • CBC & Differential     Standing Status:   Future     Standing Expiration Date:   6/22/2022     Order Specific Question:   Manual Differential     Answer:   No

## 2021-06-22 ENCOUNTER — LAB (OUTPATIENT)
Dept: LAB | Facility: HOSPITAL | Age: 71
End: 2021-06-22

## 2021-06-22 ENCOUNTER — OFFICE VISIT (OUTPATIENT)
Dept: ONCOLOGY | Facility: CLINIC | Age: 71
End: 2021-06-22

## 2021-06-22 VITALS
SYSTOLIC BLOOD PRESSURE: 142 MMHG | TEMPERATURE: 97.3 F | WEIGHT: 168.9 LBS | HEART RATE: 64 BPM | HEIGHT: 64 IN | OXYGEN SATURATION: 97 % | DIASTOLIC BLOOD PRESSURE: 66 MMHG | BODY MASS INDEX: 28.83 KG/M2 | RESPIRATION RATE: 16 BRPM

## 2021-06-22 DIAGNOSIS — Z85.3 HISTORY OF BREAST CANCER: ICD-10-CM

## 2021-06-22 DIAGNOSIS — D75.89 MACROCYTOSIS WITHOUT ANEMIA: ICD-10-CM

## 2021-06-22 DIAGNOSIS — D75.89 MACROCYTOSIS WITHOUT ANEMIA: Primary | ICD-10-CM

## 2021-06-22 DIAGNOSIS — R53.83 OTHER FATIGUE: ICD-10-CM

## 2021-06-22 DIAGNOSIS — R53.81 OTHER MALAISE: ICD-10-CM

## 2021-06-22 LAB
ALBUMIN SERPL-MCNC: 4.3 G/DL (ref 3.5–5.2)
ALBUMIN/GLOB SERPL: 2.2 G/DL (ref 1.1–2.4)
ALP SERPL-CCNC: 57 U/L (ref 38–116)
ALT SERPL W P-5'-P-CCNC: 28 U/L (ref 0–33)
ANION GAP SERPL CALCULATED.3IONS-SCNC: 11 MMOL/L (ref 5–15)
AST SERPL-CCNC: 26 U/L (ref 0–32)
BASOPHILS # BLD AUTO: 0.04 10*3/MM3 (ref 0–0.2)
BASOPHILS NFR BLD AUTO: 0.6 % (ref 0–1.5)
BILIRUB SERPL-MCNC: 0.3 MG/DL (ref 0.2–1.2)
BUN SERPL-MCNC: 24 MG/DL (ref 6–20)
BUN/CREAT SERPL: 26.4 (ref 7.3–30)
CALCIUM SPEC-SCNC: 9.4 MG/DL (ref 8.5–10.2)
CHLORIDE SERPL-SCNC: 104 MMOL/L (ref 98–107)
CO2 SERPL-SCNC: 27 MMOL/L (ref 22–29)
CREAT SERPL-MCNC: 0.91 MG/DL (ref 0.6–1.1)
DEPRECATED RDW RBC AUTO: 43.1 FL (ref 37–54)
EOSINOPHIL # BLD AUTO: 0.09 10*3/MM3 (ref 0–0.4)
EOSINOPHIL NFR BLD AUTO: 1.2 % (ref 0.3–6.2)
ERYTHROCYTE [DISTWIDTH] IN BLOOD BY AUTOMATED COUNT: 11.6 % (ref 12.3–15.4)
GFR SERPL CREATININE-BSD FRML MDRD: 61 ML/MIN/1.73
GLOBULIN UR ELPH-MCNC: 2 GM/DL (ref 1.8–3.5)
GLUCOSE SERPL-MCNC: 121 MG/DL (ref 74–124)
HCT VFR BLD AUTO: 41.3 % (ref 34–46.6)
HGB BLD-MCNC: 14.2 G/DL (ref 12–15.9)
HGB RETIC QN AUTO: 38.9 PG (ref 29.8–36.1)
IMM GRANULOCYTES # BLD AUTO: 0.02 10*3/MM3 (ref 0–0.05)
IMM GRANULOCYTES NFR BLD AUTO: 0.3 % (ref 0–0.5)
IMM RETICS NFR: 13.4 % (ref 3–15.8)
LYMPHOCYTES # BLD AUTO: 1.85 10*3/MM3 (ref 0.7–3.1)
LYMPHOCYTES NFR BLD AUTO: 25.6 % (ref 19.6–45.3)
MCH RBC QN AUTO: 34.8 PG (ref 26.6–33)
MCHC RBC AUTO-ENTMCNC: 34.4 G/DL (ref 31.5–35.7)
MCV RBC AUTO: 101.2 FL (ref 79–97)
MONOCYTES # BLD AUTO: 0.5 10*3/MM3 (ref 0.1–0.9)
MONOCYTES NFR BLD AUTO: 6.9 % (ref 5–12)
NEUTROPHILS NFR BLD AUTO: 4.72 10*3/MM3 (ref 1.7–7)
NEUTROPHILS NFR BLD AUTO: 65.4 % (ref 42.7–76)
NRBC BLD AUTO-RTO: 0 /100 WBC (ref 0–0.2)
PLATELET # BLD AUTO: 232 10*3/MM3 (ref 140–450)
PMV BLD AUTO: 9.7 FL (ref 6–12)
POTASSIUM SERPL-SCNC: 4 MMOL/L (ref 3.5–4.7)
PROT SERPL-MCNC: 6.3 G/DL (ref 6.3–8)
RBC # BLD AUTO: 4.08 10*6/MM3 (ref 3.77–5.28)
RETICS # AUTO: 0.09 10*6/MM3 (ref 0.02–0.13)
RETICS/RBC NFR AUTO: 2.36 % (ref 0.7–1.9)
SODIUM SERPL-SCNC: 142 MMOL/L (ref 134–145)
T4 FREE SERPL-MCNC: 1.38 NG/DL (ref 0.93–1.7)
TSH SERPL DL<=0.05 MIU/L-ACNC: 2.38 UIU/ML (ref 0.27–4.2)
VIT B12 BLD-MCNC: 798 PG/ML (ref 211–946)
WBC # BLD AUTO: 7.22 10*3/MM3 (ref 3.4–10.8)

## 2021-06-22 PROCEDURE — 80053 COMPREHEN METABOLIC PANEL: CPT | Performed by: INTERNAL MEDICINE

## 2021-06-22 PROCEDURE — 82607 VITAMIN B-12: CPT | Performed by: INTERNAL MEDICINE

## 2021-06-22 PROCEDURE — 85046 RETICYTE/HGB CONCENTRATE: CPT | Performed by: INTERNAL MEDICINE

## 2021-06-22 PROCEDURE — 99214 OFFICE O/P EST MOD 30 MIN: CPT | Performed by: INTERNAL MEDICINE

## 2021-06-22 PROCEDURE — 85025 COMPLETE CBC W/AUTO DIFF WBC: CPT

## 2021-06-22 PROCEDURE — 84439 ASSAY OF FREE THYROXINE: CPT | Performed by: INTERNAL MEDICINE

## 2021-06-22 PROCEDURE — 36415 COLL VENOUS BLD VENIPUNCTURE: CPT

## 2021-06-22 PROCEDURE — 84443 ASSAY THYROID STIM HORMONE: CPT | Performed by: INTERNAL MEDICINE

## 2021-06-23 LAB
ALBUMIN SERPL ELPH-MCNC: 3.7 G/DL (ref 2.9–4.4)
ALBUMIN/GLOB SERPL: 1.5 {RATIO} (ref 0.7–1.7)
ALPHA1 GLOB SERPL ELPH-MCNC: 0.2 G/DL (ref 0–0.4)
ALPHA2 GLOB SERPL ELPH-MCNC: 0.8 G/DL (ref 0.4–1)
B-GLOBULIN SERPL ELPH-MCNC: 1 G/DL (ref 0.7–1.3)
FOLATE BLD-MCNC: >620 NG/ML
FOLATE RBC-MCNC: >1546 NG/ML
GAMMA GLOB SERPL ELPH-MCNC: 0.4 G/DL (ref 0.4–1.8)
GLOBULIN SER CALC-MCNC: 2.4 G/DL (ref 2.2–3.9)
HCT VFR BLD AUTO: 40.1 % (ref 34–46.6)
IGA SERPL-MCNC: 59 MG/DL (ref 87–352)
IGG SERPL-MCNC: 446 MG/DL (ref 586–1602)
IGM SERPL-MCNC: 49 MG/DL (ref 26–217)
LABORATORY COMMENT REPORT: NORMAL
M PROTEIN SERPL ELPH-MCNC: NORMAL G/DL
PROT PATTERN SERPL IFE-IMP: ABNORMAL
PROT SERPL-MCNC: 6.1 G/DL (ref 6–8.5)

## 2021-06-24 ENCOUNTER — OFFICE VISIT (OUTPATIENT)
Dept: OBSTETRICS AND GYNECOLOGY | Age: 71
End: 2021-06-24

## 2021-06-24 VITALS
DIASTOLIC BLOOD PRESSURE: 68 MMHG | BODY MASS INDEX: 28.61 KG/M2 | WEIGHT: 167.6 LBS | HEIGHT: 64 IN | SYSTOLIC BLOOD PRESSURE: 124 MMHG

## 2021-06-24 DIAGNOSIS — M81.8 OTHER OSTEOPOROSIS WITHOUT CURRENT PATHOLOGICAL FRACTURE: ICD-10-CM

## 2021-06-24 DIAGNOSIS — L90.0 LICHEN SCLEROSUS ET ATROPHICUS: ICD-10-CM

## 2021-06-24 DIAGNOSIS — R39.89 URETHRAL PAIN: ICD-10-CM

## 2021-06-24 DIAGNOSIS — M85.80 OSTEOPENIA, SENILE: ICD-10-CM

## 2021-06-24 DIAGNOSIS — N90.4 LICHEN SCLEROSUS OF FEMALE GENITALIA: Primary | ICD-10-CM

## 2021-06-24 PROCEDURE — 99214 OFFICE O/P EST MOD 30 MIN: CPT | Performed by: OBSTETRICS & GYNECOLOGY
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
## 2021-09-09 ENCOUNTER — PROCEDURE VISIT (OUTPATIENT)
Dept: OBSTETRICS AND GYNECOLOGY | Age: 71
End: 2021-09-09

## 2021-09-09 ENCOUNTER — TELEPHONE (OUTPATIENT)
Dept: OBSTETRICS AND GYNECOLOGY | Age: 71
End: 2021-09-09

## 2021-09-09 DIAGNOSIS — M81.8 OTHER OSTEOPOROSIS WITHOUT CURRENT PATHOLOGICAL FRACTURE: ICD-10-CM

## 2021-09-09 DIAGNOSIS — M85.80 OSTEOPENIA, SENILE: ICD-10-CM

## 2021-09-09 PROCEDURE — 77080 DXA BONE DENSITY AXIAL: CPT | Performed by: OBSTETRICS & GYNECOLOGY

## 2021-09-09 RX ORDER — CONJUGATED ESTROGENS 0.62 MG/G
CREAM VAGINAL
Qty: 30 G | Refills: 1 | Status: SHIPPED | OUTPATIENT
Start: 2021-09-09

## 2021-12-13 NOTE — PROGRESS NOTES
"[facility] GROUP OUTPATIENT CLINIC FOLLOW UP VISIT    REASON FOR FOLLOW-UP:      Malignant neoplasm of upper-inner quadrant of left female breast    Staging form: Breast, AJCC V7      Clinical stage from 4/14/2016: Stage IIIC (T3, N3b, M0) - Signed by Jordon Bonilla MD on 4/28/2016    Malignant neoplasm of upper-outer quadrant of right female breast    Staging form: Breast, AJCC V7      Clinical stage from 3/25/2016: Stage IA (rT1a, N0, M0) - Signed by Erika Osborn MD on 3/25/2016    HISTORY OF PRESENT ILLNESS:  Yuli Huang is a 71 y.o. female who returns today for follow up of the above issue.     Physically she is feeling well.  She continues to have some left lower extremity pain below the knee.  Injections are helping the osteoarthritis pain in her knee.  She denies any new problems with her breasts.  She continues self-examinations and examinations with gynecology.         REVIEW OF SYSTEMS:  As per the HPI    Vitals:    12/14/21 1315   BP: 125/71   Pulse: 89   Resp: 16   Temp: 97.1 °F (36.2 °C)   TempSrc: Temporal   SpO2: 96%   Weight: 74.2 kg (163 lb 9.6 oz)   Height: 163.5 cm (64.37\")   PainSc: 0-No pain  Comment: anemia       PHYSICAL EXAMINATION:  GENERAL:  Well-developed well-nourished female; awake, alert and oriented, in no acute distress.  Wearing a face mask.  SKIN: No rash  HEAD:  Normocephalic, atraumatic. Alopecia   EARS:  Hearing intact.  CHEST: Normal respiratory effort.  Lungs clear to auscultation bilaterally.  Heart: Regular rate and rhythm without murmurs gallops or rubs  EXTREMITIES:  No clubbing, cyanosis, or edema.  There is some tenderness in the left anterior tib/fib area.  NEUROLOGICAL:  No focal neurologic deficits.    DIAGNOSTIC DATA:  CBC & Differential (12/14/2021 13:06)      IMAGING:  Mammogram 5/17/2021 BI-RADS Category 2    SCANNED - MAMMO (05/17/2021)      ASSESSMENT:  This is a 71 y.o. female with:    *History of stage I carcinoma of the right breast.    · The " biopsy initially showed DCIS.  There was a tiny invasive component on the surgical specimen.  This was minimally hormone receptor positive and HER-2 was not able to be tested.  She had radiation following her right breast lumpectomy but no medical therapy.  Requires mammograms of the right breast.  Last on 5/17/2021.    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    · She completed 6 cycles of neoadjuvant chemotherapy with TCH P followed by a lumpectomy on 10/5/2016.    · No residual invasive carcinoma in the breast but there was extensive DCIS with very close margins.    · One lymph node was positive for metastatic carcinoma.    · We discussed her case at the multidisciplinary breast conference.    · She had a mammogram that showed some persistent suspicious calcifications and therefore on 11/16/2016 had a left modified radical mastectomy with axillary lymph node sampling.    · High-grade DCIS was present but no invasive carcinoma and no lymph nodes were positive.  Margins were very close.    · She had a PET scan that was negative.    · She completed radiation therapy.    · She completed Herceptin on 4/21/2017.    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  She continues a vitamin B complex.  Overall improved.  No complaints of this at this time.    *Persistent right breast pain: Likely an adverse effect of surgery and radiation.  Imaging reassuring.  She did not really complain of this today.    *Macrocytosis: She has continued a vitamin B complex vitamin.  Her vitamin B12 was normal at 798.  Serum protein electrophoresis with immunofixation, TSH, free T4, RBC folate were all normal.  MCV only mildly elevated today at 99.3, down from 101.2.    *Genetic testing ([facility]) negative.    PLAN:  1. Continue her vitamin B complex vitamin  2. Follow-up in 6 months with a CBC  3. Follow-up with gynecology for breast  examinations  4. Annual right breast mammogram will be due in May 2022

## 2021-12-14 ENCOUNTER — OFFICE VISIT (OUTPATIENT)
Dept: ONCOLOGY | Facility: CLINIC | Age: 71
End: 2021-12-14

## 2021-12-14 ENCOUNTER — LAB (OUTPATIENT)
Dept: LAB | Facility: HOSPITAL | Age: 71
End: 2021-12-14

## 2021-12-14 VITALS
DIASTOLIC BLOOD PRESSURE: 71 MMHG | HEART RATE: 89 BPM | WEIGHT: 163.6 LBS | HEIGHT: 64 IN | SYSTOLIC BLOOD PRESSURE: 125 MMHG | BODY MASS INDEX: 27.93 KG/M2 | TEMPERATURE: 97.1 F | OXYGEN SATURATION: 96 % | RESPIRATION RATE: 16 BRPM

## 2021-12-14 DIAGNOSIS — Z85.3 HISTORY OF BREAST CANCER: ICD-10-CM

## 2021-12-14 DIAGNOSIS — D75.89 MACROCYTOSIS WITHOUT ANEMIA: ICD-10-CM

## 2021-12-14 DIAGNOSIS — D75.89 MACROCYTOSIS WITHOUT ANEMIA: Primary | ICD-10-CM

## 2021-12-14 LAB
BASOPHILS # BLD AUTO: 0.04 10*3/MM3 (ref 0–0.2)
BASOPHILS NFR BLD AUTO: 0.5 % (ref 0–1.5)
DEPRECATED RDW RBC AUTO: 43.8 FL (ref 37–54)
EOSINOPHIL # BLD AUTO: 0.08 10*3/MM3 (ref 0–0.4)
EOSINOPHIL NFR BLD AUTO: 0.9 % (ref 0.3–6.2)
ERYTHROCYTE [DISTWIDTH] IN BLOOD BY AUTOMATED COUNT: 12 % (ref 12.3–15.4)
HCT VFR BLD AUTO: 42 % (ref 34–46.6)
HGB BLD-MCNC: 14.5 G/DL (ref 12–15.9)
IMM GRANULOCYTES # BLD AUTO: 0.13 10*3/MM3 (ref 0–0.05)
IMM GRANULOCYTES NFR BLD AUTO: 1.5 % (ref 0–0.5)
LYMPHOCYTES # BLD AUTO: 1.89 10*3/MM3 (ref 0.7–3.1)
LYMPHOCYTES NFR BLD AUTO: 22.3 % (ref 19.6–45.3)
MCH RBC QN AUTO: 34.3 PG (ref 26.6–33)
MCHC RBC AUTO-ENTMCNC: 34.5 G/DL (ref 31.5–35.7)
MCV RBC AUTO: 99.3 FL (ref 79–97)
MONOCYTES # BLD AUTO: 0.61 10*3/MM3 (ref 0.1–0.9)
MONOCYTES NFR BLD AUTO: 7.2 % (ref 5–12)
NEUTROPHILS NFR BLD AUTO: 5.74 10*3/MM3 (ref 1.7–7)
NEUTROPHILS NFR BLD AUTO: 67.6 % (ref 42.7–76)
NRBC BLD AUTO-RTO: 0 /100 WBC (ref 0–0.2)
PLATELET # BLD AUTO: 267 10*3/MM3 (ref 140–450)
PMV BLD AUTO: 9.4 FL (ref 6–12)
RBC # BLD AUTO: 4.23 10*6/MM3 (ref 3.77–5.28)
WBC NRBC COR # BLD: 8.49 10*3/MM3 (ref 3.4–10.8)

## 2021-12-14 PROCEDURE — 99213 OFFICE O/P EST LOW 20 MIN: CPT | Performed by: INTERNAL MEDICINE

## 2021-12-14 PROCEDURE — 85025 COMPLETE CBC W/AUTO DIFF WBC: CPT

## 2021-12-14 PROCEDURE — 36415 COLL VENOUS BLD VENIPUNCTURE: CPT

## 2022-02-09 RX ORDER — METOPROLOL SUCCINATE 25 MG/1
TABLET, EXTENDED RELEASE ORAL
Qty: 45 TABLET | Refills: 0 | Status: SHIPPED | OUTPATIENT
Start: 2022-02-09 | End: 2022-03-14 | Stop reason: SDUPTHER

## 2022-03-14 ENCOUNTER — OFFICE VISIT (OUTPATIENT)
Dept: CARDIOLOGY | Facility: CLINIC | Age: 72
End: 2022-03-14

## 2022-03-14 VITALS
DIASTOLIC BLOOD PRESSURE: 74 MMHG | WEIGHT: 164.6 LBS | SYSTOLIC BLOOD PRESSURE: 140 MMHG | BODY MASS INDEX: 28.1 KG/M2 | HEIGHT: 64 IN | HEART RATE: 78 BPM

## 2022-03-14 DIAGNOSIS — I47.29 NSVT (NONSUSTAINED VENTRICULAR TACHYCARDIA): ICD-10-CM

## 2022-03-14 DIAGNOSIS — I49.3 PVCS (PREMATURE VENTRICULAR CONTRACTIONS): Primary | ICD-10-CM

## 2022-03-14 PROCEDURE — 99213 OFFICE O/P EST LOW 20 MIN: CPT | Performed by: INTERNAL MEDICINE

## 2022-03-14 PROCEDURE — 93000 ELECTROCARDIOGRAM COMPLETE: CPT | Performed by: INTERNAL MEDICINE

## 2022-03-14 RX ORDER — METOPROLOL SUCCINATE 25 MG/1
12.5 TABLET, EXTENDED RELEASE ORAL DAILY
Qty: 45 TABLET | Refills: 3 | Status: SHIPPED | OUTPATIENT
Start: 2022-03-14 | End: 2023-03-16 | Stop reason: SDUPTHER

## 2022-03-14 RX ORDER — TOLTERODINE 2 MG/1
2 CAPSULE, EXTENDED RELEASE ORAL 2 TIMES DAILY
COMMUNITY
Start: 2022-01-26
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
## 2022-05-08 DIAGNOSIS — Z87.39 PERSONAL HISTORY OF OSTEOPOROSIS: ICD-10-CM

## 2022-05-09 RX ORDER — ALENDRONATE SODIUM 70 MG/1
TABLET ORAL
Qty: 12 TABLET | Refills: 3 | Status: SHIPPED | OUTPATIENT
Start: 2022-05-09 | End: 2022-10-17

## 2022-05-19 ENCOUNTER — APPOINTMENT (OUTPATIENT)
Dept: WOMENS IMAGING | Facility: HOSPITAL | Age: 72
End: 2022-05-19

## 2022-05-19 PROCEDURE — 77063 BREAST TOMOSYNTHESIS BI: CPT | Performed by: RADIOLOGY

## 2022-05-19 PROCEDURE — 77067 SCR MAMMO BI INCL CAD: CPT | Performed by: RADIOLOGY

## 2022-05-24 ENCOUNTER — TELEPHONE (OUTPATIENT)
Dept: OBSTETRICS AND GYNECOLOGY | Age: 72
End: 2022-05-24

## 2022-05-24 NOTE — TELEPHONE ENCOUNTER
Hub staff attempted to follow warm transfer process and was unsuccessful     Caller: Yuli Huang    Relationship to patient: Self    Best call back number: [phone]    Patient is needing: PT CALLED BACK AFTER GETTING VM FROM [facility]. PT STATES SHE CAN SEE ON MYCHART THAT IT LOOKS LIKE EVERYTHING IS GOOD. PT STATES THAT UNLESS THERE IS ANYTHING ELSE THAT SHE NEEDS TO KNOW ABOUT THE RESULTS THAT SHE IS FINE WITH WHAT SHE RECEIVED IN MYCHART.

## 2022-06-17 RX ORDER — SODIUM CHLORIDE 0.9 % (FLUSH) 0.9 %
10 SYRINGE (ML) INJECTION AS NEEDED
OUTPATIENT
Start: 2022-06-17

## 2022-06-17 RX ORDER — HEPARIN SODIUM (PORCINE) LOCK FLUSH IV SOLN 100 UNIT/ML 100 UNIT/ML
500 SOLUTION INTRAVENOUS AS NEEDED
OUTPATIENT
Start: 2022-06-17

## 2022-06-17 NOTE — PROGRESS NOTES
"[facility] GROUP OUTPATIENT CLINIC FOLLOW UP VISIT    REASON FOR FOLLOW-UP:      Malignant neoplasm of upper-inner quadrant of left female breast    Staging form: Breast, AJCC V7      Clinical stage from 4/14/2016: Stage IIIC (T3, N3b, M0) - Signed by Jordon Bonilla MD on 4/28/2016    Malignant neoplasm of upper-outer quadrant of right female breast    Staging form: Breast, AJCC V7      Clinical stage from 3/25/2016: Stage IA (rT1a, N0, M0) - Signed by Erika Osborn MD on 3/25/2016    HISTORY OF PRESENT ILLNESS:  Yuli Huang is a 71 y.o. female who returns today for follow up of the above issue.     She is generally doing well.  She has cataracts scheduled for removal in the next few weeks.  She has worsening left ankle pain with plans for ankle replacement at some point.        REVIEW OF SYSTEMS:  As per the HPI    Vitals:    06/20/22 1303   BP: 123/70   Pulse: 83   Resp: 18   Temp: 97.1 °F (36.2 °C)   TempSrc: Temporal   SpO2: 97%   Weight: 74.9 kg (165 lb 1.6 oz)   Height: 163.5 cm (64.37\")   PainSc:   3   PainLoc: Ankle  Comment: Left       PHYSICAL EXAMINATION:  GENERAL:  Well-developed well-nourished female; awake, alert and oriented, in no acute distress.  Wearing a face mask.  SKIN: No rash  HEAD:  Normocephalic, atraumatic. Alopecia   EARS:  Hearing intact.  CHEST: Normal respiratory effort.  Lungs clear to auscultation bilaterally.  Breast and chest wall not examined today.  Heart: Regular rate and rhythm without murmurs gallops or rubs  EXTREMITIES:  No clubbing, cyanosis, or edema.  There is some tenderness in the left anterior tib/fib area chronic and stable.  NEUROLOGICAL:  No focal neurologic deficits    DIAGNOSTIC DATA:  CBC & Differential (06/20/2022 13:10)      IMAGING:  Mammogram 5/19/2022 BI-RADS Category 2    SCANNED - MAMMO (05/19/2022)        ASSESSMENT:  This is a 71 y.o. female with:    *History of stage I carcinoma of the right breast.    · The biopsy initially showed DCIS. "  There was a tiny invasive component on the surgical specimen.  This was minimally hormone receptor positive and HER-2 was not able to be tested.  She had radiation following her right breast lumpectomy but no medical therapy.  Requires mammograms of the right breast.  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    · She completed 6 cycles of neoadjuvant chemotherapy with TCH P followed by a lumpectomy on 10/5/2016.    · No residual invasive carcinoma in the breast but there was extensive DCIS with very close margins.    · One lymph node was positive for metastatic carcinoma.    · We discussed her case at the multidisciplinary breast conference.    · She had a mammogram that showed some persistent suspicious calcifications and therefore on 11/16/2016 had a left modified radical mastectomy with axillary lymph node sampling.    · High-grade DCIS was present but no invasive carcinoma and no lymph nodes were positive.  Margins were very close.    · She had a PET scan that was negative.    · She completed radiation therapy.    · She completed Herceptin on 4/21/2017.    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  She continues a vitamin B complex.  Overall improved.  No complaints of this at this time.    *Persistent right breast pain: Likely an adverse effect of surgery and radiation.  Imaging reassuring.  She did not really complain of this again today.    *Macrocytosis: She has continued a vitamin B complex vitamin.  Her vitamin B12 was normal at 798.  Serum protein electrophoresis with immunofixation, TSH, free T4, RBC folate were all normal.  MCV remains very mildly elevated at 100.0.    *Genetic testing ([facility]) negative.    *Arthritic changes in the left ankle, anticipating left ankle replacement at some point    *Bilateral cataracts, removal planned in the next couple of weeks    PLAN:  1. She  will continue her vitamin B complex vitamin  2. I will see her back in 6 months with a CBC  3. Follow-up with gynecology for right breast and chest wall examinations  4. Annual right breast mammogram will be due in May 2023

## 2022-06-20 ENCOUNTER — OFFICE VISIT (OUTPATIENT)
Dept: ONCOLOGY | Facility: CLINIC | Age: 72
End: 2022-06-20

## 2022-06-20 ENCOUNTER — LAB (OUTPATIENT)
Dept: LAB | Facility: HOSPITAL | Age: 72
End: 2022-06-20

## 2022-06-20 VITALS
TEMPERATURE: 97.1 F | OXYGEN SATURATION: 97 % | BODY MASS INDEX: 28.19 KG/M2 | WEIGHT: 165.1 LBS | DIASTOLIC BLOOD PRESSURE: 70 MMHG | HEIGHT: 64 IN | RESPIRATION RATE: 18 BRPM | HEART RATE: 83 BPM | SYSTOLIC BLOOD PRESSURE: 123 MMHG

## 2022-06-20 DIAGNOSIS — Z85.3 HISTORY OF BREAST CANCER: ICD-10-CM

## 2022-06-20 DIAGNOSIS — D75.89 MACROCYTOSIS WITHOUT ANEMIA: ICD-10-CM

## 2022-06-20 DIAGNOSIS — Z85.3 HISTORY OF BREAST CANCER: Primary | ICD-10-CM

## 2022-06-20 LAB
BASOPHILS # BLD AUTO: 0.05 10*3/MM3 (ref 0–0.2)
BASOPHILS NFR BLD AUTO: 0.8 % (ref 0–1.5)
DEPRECATED RDW RBC AUTO: 43.3 FL (ref 37–54)
EOSINOPHIL # BLD AUTO: 0.1 10*3/MM3 (ref 0–0.4)
EOSINOPHIL NFR BLD AUTO: 1.5 % (ref 0.3–6.2)
ERYTHROCYTE [DISTWIDTH] IN BLOOD BY AUTOMATED COUNT: 11.9 % (ref 12.3–15.4)
HCT VFR BLD AUTO: 39.1 % (ref 34–46.6)
HGB BLD-MCNC: 13.3 G/DL (ref 12–15.9)
IMM GRANULOCYTES # BLD AUTO: 0.05 10*3/MM3 (ref 0–0.05)
IMM GRANULOCYTES NFR BLD AUTO: 0.8 % (ref 0–0.5)
LYMPHOCYTES # BLD AUTO: 1.71 10*3/MM3 (ref 0.7–3.1)
LYMPHOCYTES NFR BLD AUTO: 26.3 % (ref 19.6–45.3)
MCH RBC QN AUTO: 34 PG (ref 26.6–33)
MCHC RBC AUTO-ENTMCNC: 34 G/DL (ref 31.5–35.7)
MCV RBC AUTO: 100 FL (ref 79–97)
MONOCYTES # BLD AUTO: 0.56 10*3/MM3 (ref 0.1–0.9)
MONOCYTES NFR BLD AUTO: 8.6 % (ref 5–12)
NEUTROPHILS NFR BLD AUTO: 4.04 10*3/MM3 (ref 1.7–7)
NEUTROPHILS NFR BLD AUTO: 62 % (ref 42.7–76)
NRBC BLD AUTO-RTO: 0 /100 WBC (ref 0–0.2)
PLATELET # BLD AUTO: 212 10*3/MM3 (ref 140–450)
PMV BLD AUTO: 10.2 FL (ref 6–12)
RBC # BLD AUTO: 3.91 10*6/MM3 (ref 3.77–5.28)
WBC NRBC COR # BLD: 6.51 10*3/MM3 (ref 3.4–10.8)

## 2022-06-20 PROCEDURE — 99213 OFFICE O/P EST LOW 20 MIN: CPT | Performed by: INTERNAL MEDICINE

## 2022-06-20 PROCEDURE — 36415 COLL VENOUS BLD VENIPUNCTURE: CPT

## 2022-06-20 PROCEDURE — 85025 COMPLETE CBC W/AUTO DIFF WBC: CPT

## 2022-06-20 RX ORDER — OMEPRAZOLE 20 MG/1
1 CAPSULE, DELAYED RELEASE ORAL
COMMUNITY
End: 2022-10-17

## 2022-07-07 DIAGNOSIS — L90.0 LICHEN SCLEROSUS ET ATROPHICUS: ICD-10-CM

## 2022-07-11 ENCOUNTER — OFFICE VISIT (OUTPATIENT)
Dept: OBSTETRICS AND GYNECOLOGY | Age: 72
End: 2022-07-11

## 2022-07-11 VITALS
HEIGHT: 64 IN | DIASTOLIC BLOOD PRESSURE: 68 MMHG | WEIGHT: 161 LBS | SYSTOLIC BLOOD PRESSURE: 128 MMHG | BODY MASS INDEX: 27.49 KG/M2

## 2022-07-11 DIAGNOSIS — M85.80 OSTEOPENIA, SENILE: ICD-10-CM

## 2022-07-11 DIAGNOSIS — L90.0 LICHEN SCLEROSUS ET ATROPHICUS: ICD-10-CM

## 2022-07-11 DIAGNOSIS — Z01.419 ENCOUNTER FOR GYNECOLOGICAL EXAMINATION WITHOUT ABNORMAL FINDING: Primary | ICD-10-CM

## 2022-07-11 PROCEDURE — G0101 CA SCREEN;PELVIC/BREAST EXAM: HCPCS | Performed by: OBSTETRICS & GYNECOLOGY
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
## 2022-07-14 ENCOUNTER — TRANSCRIBE ORDERS (OUTPATIENT)
Dept: ADMINISTRATIVE | Facility: HOSPITAL | Age: 72
End: 2022-07-14

## 2022-07-14 DIAGNOSIS — M25.572 ACUTE LEFT ANKLE PAIN: Primary | ICD-10-CM

## 2022-08-26 ENCOUNTER — HOSPITAL ENCOUNTER (OUTPATIENT)
Dept: CT IMAGING | Facility: HOSPITAL | Age: 72
Discharge: HOME | End: 2022-08-26
Admitting: ORTHOPAEDIC SURGERY

## 2022-08-26 DIAGNOSIS — M25.572 ACUTE LEFT ANKLE PAIN: ICD-10-CM

## 2022-08-26 PROCEDURE — 73700 CT LOWER EXTREMITY W/O DYE: CPT

## 2022-09-29 ENCOUNTER — TELEPHONE (OUTPATIENT)
Dept: CARDIOLOGY | Facility: CLINIC | Age: 72
End: 2022-09-29

## 2022-09-29 NOTE — TELEPHONE ENCOUNTER
Pt is schedule on 10/24/22 for LEFT TOTAL ANKLE REPLACEMENT, ACHILLES LENGTHENING with .    Attn: Greyson FAX#: [phone]

## 2022-09-29 NOTE — TELEPHONE ENCOUNTER
Date of Office Visit:  2022  Encounter Provider:  Phong Sunshine MD  Place of Service:  [facility] CARDIOLOGY  Patient Name: Yuli Huang  :  1950    To Whom it May Concern:    Ms Huang has a history of premature ventricular contractions. She does not have CAD or CHF. She is at low risk of major adverse CV events with upcoming orthopedic surgery.      Phong Sunshine MD  [facility]

## 2022-10-17 ENCOUNTER — PRE-ADMISSION TESTING (OUTPATIENT)
Dept: PREADMISSION TESTING | Facility: HOSPITAL | Age: 72
End: 2022-10-17

## 2022-10-17 VITALS
HEART RATE: 93 BPM | TEMPERATURE: 98.6 F | BODY MASS INDEX: 26.49 KG/M2 | OXYGEN SATURATION: 97 % | WEIGHT: 159 LBS | DIASTOLIC BLOOD PRESSURE: 79 MMHG | SYSTOLIC BLOOD PRESSURE: 141 MMHG | HEIGHT: 65 IN

## 2022-10-17 LAB
ALBUMIN SERPL-MCNC: 4 G/DL (ref 3.5–5.2)
ALBUMIN/GLOB SERPL: 2.1 G/DL
ALP SERPL-CCNC: 59 U/L (ref 39–117)
ALT SERPL W P-5'-P-CCNC: 20 U/L (ref 1–33)
ANION GAP SERPL CALCULATED.3IONS-SCNC: 12.6 MMOL/L (ref 5–15)
AST SERPL-CCNC: 22 U/L (ref 1–32)
BASOPHILS # BLD AUTO: 0.04 10*3/MM3 (ref 0–0.2)
BASOPHILS NFR BLD AUTO: 0.5 % (ref 0–1.5)
BILIRUB SERPL-MCNC: 0.3 MG/DL (ref 0–1.2)
BUN SERPL-MCNC: 19 MG/DL (ref 8–23)
BUN/CREAT SERPL: 22.1 (ref 7–25)
CALCIUM SPEC-SCNC: 9.1 MG/DL (ref 8.6–10.5)
CHLORIDE SERPL-SCNC: 104 MMOL/L (ref 98–107)
CO2 SERPL-SCNC: 24.4 MMOL/L (ref 22–29)
CREAT SERPL-MCNC: 0.86 MG/DL (ref 0.57–1)
DEPRECATED RDW RBC AUTO: 41.7 FL (ref 37–54)
EGFRCR SERPLBLD CKD-EPI 2021: 72.8 ML/MIN/1.73
EOSINOPHIL # BLD AUTO: 0.05 10*3/MM3 (ref 0–0.4)
EOSINOPHIL NFR BLD AUTO: 0.6 % (ref 0.3–6.2)
ERYTHROCYTE [DISTWIDTH] IN BLOOD BY AUTOMATED COUNT: 11.7 % (ref 12.3–15.4)
GLOBULIN UR ELPH-MCNC: 1.9 GM/DL
GLUCOSE SERPL-MCNC: 118 MG/DL (ref 65–99)
HCT VFR BLD AUTO: 35.8 % (ref 34–46.6)
HGB BLD-MCNC: 12.4 G/DL (ref 12–15.9)
IMM GRANULOCYTES # BLD AUTO: 0.07 10*3/MM3 (ref 0–0.05)
IMM GRANULOCYTES NFR BLD AUTO: 0.9 % (ref 0–0.5)
LYMPHOCYTES # BLD AUTO: 1.78 10*3/MM3 (ref 0.7–3.1)
LYMPHOCYTES NFR BLD AUTO: 22.8 % (ref 19.6–45.3)
MCH RBC QN AUTO: 34.1 PG (ref 26.6–33)
MCHC RBC AUTO-ENTMCNC: 34.6 G/DL (ref 31.5–35.7)
MCV RBC AUTO: 98.4 FL (ref 79–97)
MONOCYTES # BLD AUTO: 0.52 10*3/MM3 (ref 0.1–0.9)
MONOCYTES NFR BLD AUTO: 6.6 % (ref 5–12)
NEUTROPHILS NFR BLD AUTO: 5.36 10*3/MM3 (ref 1.7–7)
NEUTROPHILS NFR BLD AUTO: 68.6 % (ref 42.7–76)
NRBC BLD AUTO-RTO: 0 /100 WBC (ref 0–0.2)
PLATELET # BLD AUTO: 298 10*3/MM3 (ref 140–450)
PMV BLD AUTO: 9.2 FL (ref 6–12)
POTASSIUM SERPL-SCNC: 3.8 MMOL/L (ref 3.5–5.2)
PROT SERPL-MCNC: 5.9 G/DL (ref 6–8.5)
QT INTERVAL: 400 MS
RBC # BLD AUTO: 3.64 10*6/MM3 (ref 3.77–5.28)
SODIUM SERPL-SCNC: 141 MMOL/L (ref 136–145)
WBC NRBC COR # BLD: 7.82 10*3/MM3 (ref 3.4–10.8)

## 2022-10-17 PROCEDURE — 85025 COMPLETE CBC W/AUTO DIFF WBC: CPT

## 2022-10-17 PROCEDURE — 80053 COMPREHEN METABOLIC PANEL: CPT

## 2022-10-17 PROCEDURE — 93005 ELECTROCARDIOGRAM TRACING: CPT

## 2022-10-17 PROCEDURE — 93010 ELECTROCARDIOGRAM REPORT: CPT | Performed by: INTERNAL MEDICINE

## 2022-10-17 PROCEDURE — 36415 COLL VENOUS BLD VENIPUNCTURE: CPT

## 2022-10-17 RX ORDER — ATORVASTATIN CALCIUM 40 MG/1
40 TABLET, FILM COATED ORAL NIGHTLY
COMMUNITY

## 2022-10-17 RX ORDER — LANSOPRAZOLE 15 MG/1
15 CAPSULE, DELAYED RELEASE ORAL DAILY
COMMUNITY

## 2022-10-17 NOTE — DISCHARGE INSTRUCTIONS
Take the following medications the morning of surgery:  LANSOPRAZOLE    ARRIVE TO OUTPATIENT SURGERY CENTER 10-24-22:  HOSPITAL WILL CALL YOU WITH ARRIVAL TIME    If you are on prescription narcotic pain medication to control your pain you may also take that medication the morning of surgery.    General Instructions:  Do not eat solid food after midnight the night before surgery.  You may drink clear liquids day of surgery but must stop at least one hour before your hospital arrival time.  It is beneficial for you to have a clear drink that contains carbohydrates the day of surgery.  We suggest a 12 to 20 ounce bottle of Gatorade or Powerade for non-diabetic patients or a 12 to 20 ounce bottle of G2 or Powerade Zero for diabetic patients. (Pediatric patients, are not advised to drink a 12 to 20 ounce carbohydrate drink)    Clear liquids are liquids you can see through.  Nothing red in color.     Plain water                               Sports drinks  Sodas                                   Gelatin (Jell-O)  Fruit juices without pulp such as white grape juice and apple juice  Popsicles that contain no fruit or yogurt  Tea or coffee (no cream or milk added)  Gatorade / Powerade  G2 / Powerade Zero    Infants may have breast milk up to four hours before surgery.  Infants drinking formula may drink formula up to six hours before surgery.   Patients who avoid smoking, chewing tobacco and alcohol for 4 weeks prior to surgery have a reduced risk of post-operative complications.  Quit smoking as many days before surgery as you can.  Do not smoke, use chewing tobacco or drink alcohol the day of surgery.   If applicable bring your C-PAP/ BI-PAP machine.  Bring any papers given to you in the doctor’s office.  Wear clean comfortable clothes.  Do not wear contact lenses, false eyelashes or make-up.  Bring a case for your glasses.   Bring crutches or walker if applicable.  Remove all piercings.  Leave jewelry and any other  valuables at home.  Hair extensions with metal clips must be removed prior to surgery.  The Pre-Admission Testing nurse will instruct you to bring medications if unable to obtain an accurate list in Pre-Admission Testing.        Preventing a Surgical Site Infection:  For 2 to 3 days before surgery, avoid shaving with a razor because the razor can irritate skin and make it easier to develop an infection.    Any areas of open skin can increase the risk of a post-operative wound infection by allowing bacteria to enter and travel throughout the body.  Notify your surgeon if you have any skin wounds / rashes even if it is not near the expected surgical site.  The area will need assessed to determine if surgery should be delayed until it is healed.  The night prior to surgery shower using a fresh bar of anti-bacterial soap (such as Dial) and clean washcloth.  Sleep in a clean bed with clean clothing.  Do not allow pets to sleep with you.  Shower on the morning of surgery using a fresh bar of anti-bacterial soap (such as Dial) and clean washcloth.  Dry with a clean towel and dress in clean clothing.  Ask your surgeon if you will be receiving antibiotics prior to surgery.  Make sure you, your family, and all healthcare providers clean their hands with soap and water or an alcohol based hand  before caring for you or your wound.    Day of surgery:  Your arrival time is approximately two hours before your scheduled surgery time.  Upon arrival, a Pre-op nurse and Anesthesiologist will review your health history, obtain vital signs, and answer questions you may have.  The only belongings needed at this time will be a list of your home medications and if applicable your C-PAP/BI-PAP machine.  A Pre-op nurse will start an IV and you may receive medication in preparation for surgery, including something to help you relax.     Please be aware that surgery does come with discomfort.  We want to make every effort to control  your discomfort so please discuss any uncontrolled symptoms with your nurse.   Your doctor will most likely have prescribed pain medications.      If you are going home after surgery you will receive individualized written care instructions before being discharged.  A responsible adult must drive you to and from the hospital on the day of your surgery and stay with you for 24 hours.  Discharge prescriptions can be filled by the hospital pharmacy during regular pharmacy hours.  If you are having surgery late in the day/evening your prescription may be e-prescribed to your pharmacy.  Please verify your pharmacy hours or chose a 24 hour pharmacy to avoid not having access to your prescription because your pharmacy has closed for the day.    If you are staying overnight following surgery, you will be transported to your hospital room following the recovery period.  [facility] has all private rooms.    If you have any questions please call Pre-Admission Testing at [phone].  Deductibles and co-payments are collected on the day of service. Please be prepared to pay the required co-pay, deductible or deposit on the day of service as defined by your plan.    Call your surgeon immediately if you experience any of the following symptoms:  Sore Throat  Shortness of Breath or difficulty breathing  Cough  Chills  Body soreness or muscle pain  Headache  Fever  New loss of taste or smell  Do not arrive for your surgery ill.  Your procedure will need to be rescheduled to another time.  You will need to call your physician before the day of surgery to avoid any unnecessary exposure to hospital staff as well as other patients.

## 2022-10-23 RX ORDER — CEFAZOLIN SODIUM 2 G/100ML
2 INJECTION, SOLUTION INTRAVENOUS
Status: COMPLETED | OUTPATIENT
Start: 2022-10-24 | End: 2022-10-24

## 2022-10-24 ENCOUNTER — APPOINTMENT (OUTPATIENT)
Dept: GENERAL RADIOLOGY | Facility: HOSPITAL | Age: 72
End: 2022-10-24

## 2022-10-24 ENCOUNTER — ANESTHESIA EVENT (OUTPATIENT)
Dept: PERIOP | Facility: HOSPITAL | Age: 72
End: 2022-10-24

## 2022-10-24 ENCOUNTER — ANESTHESIA (OUTPATIENT)
Dept: PERIOP | Facility: HOSPITAL | Age: 72
End: 2022-10-24

## 2022-10-24 ENCOUNTER — HOSPITAL ENCOUNTER (OUTPATIENT)
Facility: HOSPITAL | Age: 72
Discharge: HOME | End: 2022-10-25
Attending: ORTHOPAEDIC SURGERY | Admitting: ORTHOPAEDIC SURGERY

## 2022-10-24 DIAGNOSIS — M19.079 ANKLE ARTHRITIS: Primary | ICD-10-CM

## 2022-10-24 PROCEDURE — 25010000002 ONDANSETRON PER 1 MG: Performed by: NURSE ANESTHETIST, CERTIFIED REGISTERED

## 2022-10-24 PROCEDURE — 76942 ECHO GUIDE FOR BIOPSY: CPT | Performed by: ORTHOPAEDIC SURGERY

## 2022-10-24 PROCEDURE — 73600 X-RAY EXAM OF ANKLE: CPT

## 2022-10-24 PROCEDURE — 25010000002 DEXAMETHASONE SODIUM PHOSPHATE 20 MG/5ML SOLUTION: Performed by: NURSE ANESTHETIST, CERTIFIED REGISTERED

## 2022-10-24 PROCEDURE — 25010000002 ROPIVACAINE PER 1 MG: Performed by: ANESTHESIOLOGY

## 2022-10-24 PROCEDURE — 25010000002 FENTANYL CITRATE (PF) 50 MCG/ML SOLUTION: Performed by: ANESTHESIOLOGY

## 2022-10-24 PROCEDURE — C1713 ANCHOR/SCREW BN/BN,TIS/BN: HCPCS | Performed by: ORTHOPAEDIC SURGERY

## 2022-10-24 PROCEDURE — 25010000002 MIDAZOLAM PER 1 MG: Performed by: ANESTHESIOLOGY

## 2022-10-24 PROCEDURE — 25010000002 FENTANYL CITRATE (PF) 100 MCG/2ML SOLUTION: Performed by: NURSE ANESTHETIST, CERTIFIED REGISTERED

## 2022-10-24 PROCEDURE — 25010000002 VANCOMYCIN 1 G RECONSTITUTED SOLUTION: Performed by: ORTHOPAEDIC SURGERY

## 2022-10-24 PROCEDURE — 25010000002 CEFAZOLIN IN DEXTROSE 2-4 GM/100ML-% SOLUTION: Performed by: ORTHOPAEDIC SURGERY

## 2022-10-24 PROCEDURE — C1776 JOINT DEVICE (IMPLANTABLE): HCPCS | Performed by: ORTHOPAEDIC SURGERY

## 2022-10-24 PROCEDURE — 25010000002 PROPOFOL 10 MG/ML EMULSION: Performed by: NURSE ANESTHETIST, CERTIFIED REGISTERED

## 2022-10-24 PROCEDURE — 76000 FLUOROSCOPY <1 HR PHYS/QHP: CPT

## 2022-10-24 PROCEDURE — 25010000002 DROPERIDOL PER 5 MG: Performed by: ANESTHESIOLOGY

## 2022-10-24 PROCEDURE — 25010000002 NEOSTIGMINE 5 MG/10ML SOLUTION: Performed by: NURSE ANESTHETIST, CERTIFIED REGISTERED

## 2022-10-24 PROCEDURE — 63710000001 PROMETHAZINE PER 25 MG: Performed by: NURSE ANESTHETIST, CERTIFIED REGISTERED

## 2022-10-24 PROCEDURE — C1769 GUIDE WIRE: HCPCS | Performed by: ORTHOPAEDIC SURGERY

## 2022-10-24 DEVICE — IMPLANTABLE DEVICE: Type: IMPLANTABLE DEVICE | Site: ANKLE | Status: FUNCTIONAL

## 2022-10-24 DEVICE — SCREW, CANN, HEADED (T15), 4.0 X 48MM
Type: IMPLANTABLE DEVICE | Site: ANKLE | Status: FUNCTIONAL
Brand: MEDLINE

## 2022-10-24 RX ORDER — SODIUM CHLORIDE 0.9 % (FLUSH) 0.9 %
10 SYRINGE (ML) INJECTION AS NEEDED
Status: DISCONTINUED | OUTPATIENT
Start: 2022-10-24 | End: 2022-10-25 | Stop reason: HOSPADM

## 2022-10-24 RX ORDER — LABETALOL HYDROCHLORIDE 5 MG/ML
5 INJECTION, SOLUTION INTRAVENOUS
Status: DISCONTINUED | OUTPATIENT
Start: 2022-10-24 | End: 2022-10-24 | Stop reason: HOSPADM

## 2022-10-24 RX ORDER — MAGNESIUM HYDROXIDE 1200 MG/15ML
LIQUID ORAL AS NEEDED
Status: DISCONTINUED | OUTPATIENT
Start: 2022-10-24 | End: 2022-10-24 | Stop reason: HOSPADM

## 2022-10-24 RX ORDER — OXYCODONE AND ACETAMINOPHEN 7.5; 325 MG/1; MG/1
1 TABLET ORAL EVERY 4 HOURS PRN
Status: DISCONTINUED | OUTPATIENT
Start: 2022-10-24 | End: 2022-10-25 | Stop reason: HOSPADM

## 2022-10-24 RX ORDER — HYDROCODONE BITARTRATE AND ACETAMINOPHEN 7.5; 325 MG/1; MG/1
1 TABLET ORAL ONCE AS NEEDED
Status: DISCONTINUED | OUTPATIENT
Start: 2022-10-24 | End: 2022-10-24 | Stop reason: HOSPADM

## 2022-10-24 RX ORDER — FENTANYL CITRATE 50 UG/ML
50 INJECTION, SOLUTION INTRAMUSCULAR; INTRAVENOUS
Status: DISCONTINUED | OUTPATIENT
Start: 2022-10-24 | End: 2022-10-24 | Stop reason: HOSPADM

## 2022-10-24 RX ORDER — CEFAZOLIN SODIUM 2 G/100ML
2 INJECTION, SOLUTION INTRAVENOUS EVERY 8 HOURS
Status: COMPLETED | OUTPATIENT
Start: 2022-10-24 | End: 2022-10-25

## 2022-10-24 RX ORDER — MIDAZOLAM HYDROCHLORIDE 1 MG/ML
0.5 INJECTION INTRAMUSCULAR; INTRAVENOUS
Status: DISCONTINUED | OUTPATIENT
Start: 2022-10-24 | End: 2022-10-24 | Stop reason: HOSPADM

## 2022-10-24 RX ORDER — FENTANYL CITRATE 50 UG/ML
INJECTION, SOLUTION INTRAMUSCULAR; INTRAVENOUS AS NEEDED
Status: DISCONTINUED | OUTPATIENT
Start: 2022-10-24 | End: 2022-10-24 | Stop reason: SURG

## 2022-10-24 RX ORDER — DIPHENHYDRAMINE HCL 25 MG
25 CAPSULE ORAL
Status: DISCONTINUED | OUTPATIENT
Start: 2022-10-24 | End: 2022-10-24 | Stop reason: HOSPADM

## 2022-10-24 RX ORDER — PROPOFOL 10 MG/ML
VIAL (ML) INTRAVENOUS AS NEEDED
Status: DISCONTINUED | OUTPATIENT
Start: 2022-10-24 | End: 2022-10-24 | Stop reason: SURG

## 2022-10-24 RX ORDER — HYDROMORPHONE HYDROCHLORIDE 1 MG/ML
0.5 INJECTION, SOLUTION INTRAMUSCULAR; INTRAVENOUS; SUBCUTANEOUS
Status: DISCONTINUED | OUTPATIENT
Start: 2022-10-24 | End: 2022-10-24 | Stop reason: HOSPADM

## 2022-10-24 RX ORDER — GLYCOPYRROLATE 0.2 MG/ML
INJECTION INTRAMUSCULAR; INTRAVENOUS AS NEEDED
Status: DISCONTINUED | OUTPATIENT
Start: 2022-10-24 | End: 2022-10-24 | Stop reason: SURG

## 2022-10-24 RX ORDER — IBUPROFEN 600 MG/1
600 TABLET ORAL ONCE AS NEEDED
Status: DISCONTINUED | OUTPATIENT
Start: 2022-10-24 | End: 2022-10-24 | Stop reason: HOSPADM

## 2022-10-24 RX ORDER — PROMETHAZINE HYDROCHLORIDE 25 MG/1
25 SUPPOSITORY RECTAL ONCE AS NEEDED
Status: COMPLETED | OUTPATIENT
Start: 2022-10-24 | End: 2022-10-24

## 2022-10-24 RX ORDER — ONDANSETRON 2 MG/ML
4 INJECTION INTRAMUSCULAR; INTRAVENOUS ONCE AS NEEDED
Status: COMPLETED | OUTPATIENT
Start: 2022-10-24 | End: 2022-10-24

## 2022-10-24 RX ORDER — EPHEDRINE SULFATE 50 MG/ML
INJECTION INTRAVENOUS AS NEEDED
Status: DISCONTINUED | OUTPATIENT
Start: 2022-10-24 | End: 2022-10-24 | Stop reason: SURG

## 2022-10-24 RX ORDER — NALOXONE HCL 0.4 MG/ML
0.4 VIAL (ML) INJECTION
Status: DISCONTINUED | OUTPATIENT
Start: 2022-10-24 | End: 2022-10-25 | Stop reason: HOSPADM

## 2022-10-24 RX ORDER — DEXAMETHASONE SODIUM PHOSPHATE 4 MG/ML
INJECTION, SOLUTION INTRA-ARTICULAR; INTRALESIONAL; INTRAMUSCULAR; INTRAVENOUS; SOFT TISSUE AS NEEDED
Status: DISCONTINUED | OUTPATIENT
Start: 2022-10-24 | End: 2022-10-24 | Stop reason: SURG

## 2022-10-24 RX ORDER — FLUTICASONE PROPIONATE 50 MCG
2 SPRAY, SUSPENSION (ML) NASAL NIGHTLY
Status: DISCONTINUED | OUTPATIENT
Start: 2022-10-24 | End: 2022-10-25 | Stop reason: HOSPADM

## 2022-10-24 RX ORDER — LIDOCAINE HYDROCHLORIDE 10 MG/ML
0.5 INJECTION, SOLUTION EPIDURAL; INFILTRATION; INTRACAUDAL; PERINEURAL ONCE AS NEEDED
Status: DISCONTINUED | OUTPATIENT
Start: 2022-10-24 | End: 2022-10-24 | Stop reason: HOSPADM

## 2022-10-24 RX ORDER — DIPHENHYDRAMINE HYDROCHLORIDE 50 MG/ML
12.5 INJECTION INTRAMUSCULAR; INTRAVENOUS
Status: DISCONTINUED | OUTPATIENT
Start: 2022-10-24 | End: 2022-10-24 | Stop reason: HOSPADM

## 2022-10-24 RX ORDER — EPHEDRINE SULFATE 50 MG/ML
5 INJECTION, SOLUTION INTRAVENOUS ONCE AS NEEDED
Status: DISCONTINUED | OUTPATIENT
Start: 2022-10-24 | End: 2022-10-24 | Stop reason: HOSPADM

## 2022-10-24 RX ORDER — NEOSTIGMINE METHYLSULFATE 0.5 MG/ML
INJECTION, SOLUTION INTRAVENOUS AS NEEDED
Status: DISCONTINUED | OUTPATIENT
Start: 2022-10-24 | End: 2022-10-24 | Stop reason: SURG

## 2022-10-24 RX ORDER — SODIUM CHLORIDE 0.9 % (FLUSH) 0.9 %
3-10 SYRINGE (ML) INJECTION AS NEEDED
Status: DISCONTINUED | OUTPATIENT
Start: 2022-10-24 | End: 2022-10-24 | Stop reason: HOSPADM

## 2022-10-24 RX ORDER — SODIUM CHLORIDE 0.9 % (FLUSH) 0.9 %
3 SYRINGE (ML) INJECTION EVERY 12 HOURS SCHEDULED
Status: DISCONTINUED | OUTPATIENT
Start: 2022-10-24 | End: 2022-10-24 | Stop reason: HOSPADM

## 2022-10-24 RX ORDER — ROPIVACAINE HYDROCHLORIDE 5 MG/ML
INJECTION, SOLUTION EPIDURAL; INFILTRATION; PERINEURAL
Status: COMPLETED | OUTPATIENT
Start: 2022-10-24 | End: 2022-10-24

## 2022-10-24 RX ORDER — FLUMAZENIL 0.1 MG/ML
0.2 INJECTION INTRAVENOUS AS NEEDED
Status: DISCONTINUED | OUTPATIENT
Start: 2022-10-24 | End: 2022-10-24 | Stop reason: HOSPADM

## 2022-10-24 RX ORDER — OXYCODONE AND ACETAMINOPHEN 7.5; 325 MG/1; MG/1
1 TABLET ORAL EVERY 4 HOURS PRN
Status: DISCONTINUED | OUTPATIENT
Start: 2022-10-24 | End: 2022-10-24 | Stop reason: SDUPTHER

## 2022-10-24 RX ORDER — FAMOTIDINE 10 MG/ML
20 INJECTION, SOLUTION INTRAVENOUS ONCE
Status: COMPLETED | OUTPATIENT
Start: 2022-10-24 | End: 2022-10-24

## 2022-10-24 RX ORDER — SODIUM CHLORIDE 0.9 % (FLUSH) 0.9 %
10 SYRINGE (ML) INJECTION EVERY 12 HOURS SCHEDULED
Status: DISCONTINUED | OUTPATIENT
Start: 2022-10-24 | End: 2022-10-25 | Stop reason: HOSPADM

## 2022-10-24 RX ORDER — ATORVASTATIN CALCIUM 20 MG/1
40 TABLET, FILM COATED ORAL NIGHTLY
Status: DISCONTINUED | OUTPATIENT
Start: 2022-10-24 | End: 2022-10-25 | Stop reason: HOSPADM

## 2022-10-24 RX ORDER — ASPIRIN 325 MG
325 TABLET, DELAYED RELEASE (ENTERIC COATED) ORAL DAILY
Status: DISCONTINUED | OUTPATIENT
Start: 2022-10-25 | End: 2022-10-25 | Stop reason: HOSPADM

## 2022-10-24 RX ORDER — ROCURONIUM BROMIDE 10 MG/ML
INJECTION, SOLUTION INTRAVENOUS AS NEEDED
Status: DISCONTINUED | OUTPATIENT
Start: 2022-10-24 | End: 2022-10-24 | Stop reason: SURG

## 2022-10-24 RX ORDER — ONDANSETRON 2 MG/ML
INJECTION INTRAMUSCULAR; INTRAVENOUS AS NEEDED
Status: DISCONTINUED | OUTPATIENT
Start: 2022-10-24 | End: 2022-10-24 | Stop reason: SURG

## 2022-10-24 RX ORDER — ONDANSETRON 2 MG/ML
4 INJECTION INTRAMUSCULAR; INTRAVENOUS ONCE AS NEEDED
Status: DISCONTINUED | OUTPATIENT
Start: 2022-10-24 | End: 2022-10-24 | Stop reason: HOSPADM

## 2022-10-24 RX ORDER — OXYBUTYNIN CHLORIDE 10 MG/1
10 TABLET, EXTENDED RELEASE ORAL DAILY
Status: DISCONTINUED | OUTPATIENT
Start: 2022-10-24 | End: 2022-10-25 | Stop reason: HOSPADM

## 2022-10-24 RX ORDER — DROPERIDOL 2.5 MG/ML
0.62 INJECTION, SOLUTION INTRAMUSCULAR; INTRAVENOUS ONCE
Status: COMPLETED | OUTPATIENT
Start: 2022-10-24 | End: 2022-10-24

## 2022-10-24 RX ORDER — PROMETHAZINE HYDROCHLORIDE 25 MG/1
25 TABLET ORAL ONCE AS NEEDED
Status: COMPLETED | OUTPATIENT
Start: 2022-10-24 | End: 2022-10-24

## 2022-10-24 RX ORDER — HYDRALAZINE HYDROCHLORIDE 20 MG/ML
5 INJECTION INTRAMUSCULAR; INTRAVENOUS
Status: DISCONTINUED | OUTPATIENT
Start: 2022-10-24 | End: 2022-10-24 | Stop reason: HOSPADM

## 2022-10-24 RX ORDER — BUPIVACAINE HCL/0.9 % NACL/PF 0.125 %
PLASTIC BAG, INJECTION (ML) EPIDURAL AS NEEDED
Status: DISCONTINUED | OUTPATIENT
Start: 2022-10-24 | End: 2022-10-24 | Stop reason: SURG

## 2022-10-24 RX ORDER — PANTOPRAZOLE SODIUM 40 MG/1
40 TABLET, DELAYED RELEASE ORAL EVERY MORNING
Status: DISCONTINUED | OUTPATIENT
Start: 2022-10-24 | End: 2022-10-25 | Stop reason: HOSPADM

## 2022-10-24 RX ORDER — SODIUM CHLORIDE, SODIUM LACTATE, POTASSIUM CHLORIDE, CALCIUM CHLORIDE 600; 310; 30; 20 MG/100ML; MG/100ML; MG/100ML; MG/100ML
9 INJECTION, SOLUTION INTRAVENOUS CONTINUOUS
Status: DISCONTINUED | OUTPATIENT
Start: 2022-10-24 | End: 2022-10-25 | Stop reason: HOSPADM

## 2022-10-24 RX ORDER — METOPROLOL SUCCINATE 25 MG/1
12.5 TABLET, EXTENDED RELEASE ORAL NIGHTLY
Status: DISCONTINUED | OUTPATIENT
Start: 2022-10-24 | End: 2022-10-25 | Stop reason: HOSPADM

## 2022-10-24 RX ORDER — NALOXONE HCL 0.4 MG/ML
0.2 VIAL (ML) INJECTION AS NEEDED
Status: DISCONTINUED | OUTPATIENT
Start: 2022-10-24 | End: 2022-10-24 | Stop reason: HOSPADM

## 2022-10-24 RX ORDER — VANCOMYCIN HYDROCHLORIDE 1 G/20ML
INJECTION, POWDER, LYOPHILIZED, FOR SOLUTION INTRAVENOUS AS NEEDED
Status: DISCONTINUED | OUTPATIENT
Start: 2022-10-24 | End: 2022-10-24 | Stop reason: HOSPADM

## 2022-10-24 RX ORDER — OXYCODONE AND ACETAMINOPHEN 7.5; 325 MG/1; MG/1
1 TABLET ORAL EVERY 4 HOURS PRN
Status: DISCONTINUED | OUTPATIENT
Start: 2022-10-24 | End: 2022-10-24 | Stop reason: HOSPADM

## 2022-10-24 RX ORDER — LIDOCAINE HYDROCHLORIDE 20 MG/ML
INJECTION, SOLUTION INFILTRATION; PERINEURAL AS NEEDED
Status: DISCONTINUED | OUTPATIENT
Start: 2022-10-24 | End: 2022-10-24 | Stop reason: SURG

## 2022-10-24 RX ORDER — ONDANSETRON 2 MG/ML
4 INJECTION INTRAMUSCULAR; INTRAVENOUS EVERY 6 HOURS PRN
Status: DISCONTINUED | OUTPATIENT
Start: 2022-10-24 | End: 2022-10-25 | Stop reason: HOSPADM

## 2022-10-24 RX ADMIN — ONDANSETRON 4 MG: 2 INJECTION INTRAMUSCULAR; INTRAVENOUS at 09:36

## 2022-10-24 RX ADMIN — PROPOFOL 200 MG: 10 INJECTION, EMULSION INTRAVENOUS at 07:10

## 2022-10-24 RX ADMIN — PROMETHAZINE HYDROCHLORIDE 25 MG: 25 TABLET ORAL at 10:34

## 2022-10-24 RX ADMIN — LIDOCAINE HYDROCHLORIDE 80 MG: 20 INJECTION, SOLUTION INFILTRATION; PERINEURAL at 07:10

## 2022-10-24 RX ADMIN — Medication 10 ML: at 20:06

## 2022-10-24 RX ADMIN — OXYBUTYNIN CHLORIDE 10 MG: 10 TABLET, EXTENDED RELEASE ORAL at 14:56

## 2022-10-24 RX ADMIN — ATORVASTATIN CALCIUM 40 MG: 20 TABLET, FILM COATED ORAL at 20:04

## 2022-10-24 RX ADMIN — GLYCOPYRROLATE 0.4 MCG: 1 INJECTION INTRAMUSCULAR; INTRAVENOUS at 09:43

## 2022-10-24 RX ADMIN — PANTOPRAZOLE SODIUM 40 MG: 40 TABLET, DELAYED RELEASE ORAL at 14:56

## 2022-10-24 RX ADMIN — DEXAMETHASONE SODIUM PHOSPHATE 8 MG: 4 INJECTION, SOLUTION INTRAMUSCULAR; INTRAVENOUS at 09:36

## 2022-10-24 RX ADMIN — MIDAZOLAM 2 MG: 1 INJECTION, SOLUTION INTRAMUSCULAR; INTRAVENOUS at 06:34

## 2022-10-24 RX ADMIN — ROPIVACAINE HYDROCHLORIDE 15 ML: 5 INJECTION EPIDURAL; INFILTRATION; PERINEURAL at 06:40

## 2022-10-24 RX ADMIN — FENTANYL CITRATE 50 MCG: 50 INJECTION, SOLUTION INTRAMUSCULAR; INTRAVENOUS at 08:36

## 2022-10-24 RX ADMIN — CEFAZOLIN SODIUM 2 G: 2 INJECTION, SOLUTION INTRAVENOUS at 14:56

## 2022-10-24 RX ADMIN — EPHEDRINE SULFATE 5 MG: 50 INJECTION INTRAVENOUS at 07:23

## 2022-10-24 RX ADMIN — CEFAZOLIN SODIUM 2 G: 2 INJECTION, SOLUTION INTRAVENOUS at 22:06

## 2022-10-24 RX ADMIN — FENTANYL CITRATE 50 MCG: 50 INJECTION, SOLUTION INTRAMUSCULAR; INTRAVENOUS at 07:10

## 2022-10-24 RX ADMIN — Medication 200 MCG: at 07:17

## 2022-10-24 RX ADMIN — ROCURONIUM BROMIDE 50 MG: 10 INJECTION, SOLUTION INTRAVENOUS at 07:10

## 2022-10-24 RX ADMIN — FENTANYL CITRATE 50 MCG: 50 INJECTION INTRAMUSCULAR; INTRAVENOUS at 06:34

## 2022-10-24 RX ADMIN — NEOSTIGMINE METHYLSULFATE 4 MG: 0.5 INJECTION INTRAVENOUS at 09:43

## 2022-10-24 RX ADMIN — ROCURONIUM BROMIDE 10 MG: 10 INJECTION, SOLUTION INTRAVENOUS at 08:36

## 2022-10-24 RX ADMIN — CEFAZOLIN SODIUM 2 G: 2 INJECTION, SOLUTION INTRAVENOUS at 07:00

## 2022-10-24 RX ADMIN — OXYCODONE HYDROCHLORIDE AND ACETAMINOPHEN 1 TABLET: 7.5; 325 TABLET ORAL at 19:58

## 2022-10-24 RX ADMIN — DROPERIDOL 0.62 MG: 2.5 INJECTION, SOLUTION INTRAMUSCULAR; INTRAVENOUS at 11:12

## 2022-10-24 RX ADMIN — METOPROLOL SUCCINATE 12.5 MG: 25 TABLET, EXTENDED RELEASE ORAL at 20:04

## 2022-10-24 RX ADMIN — ROPIVACAINE HYDROCHLORIDE 25 ML: 5 INJECTION, SOLUTION EPIDURAL; INFILTRATION; PERINEURAL at 06:40

## 2022-10-24 RX ADMIN — ONDANSETRON 4 MG: 2 INJECTION INTRAMUSCULAR; INTRAVENOUS at 10:09

## 2022-10-24 RX ADMIN — Medication 10 ML: at 14:58

## 2022-10-24 RX ADMIN — FAMOTIDINE 20 MG: 10 INJECTION INTRAVENOUS at 06:34

## 2022-10-24 NOTE — ANESTHESIA PROCEDURE NOTES
Peripheral Block    Pre-sedation assessment completed: 10/24/2022 6:40 AM    Patient reassessed immediately prior to procedure    Patient location during procedure: holding area  Start time: 10/24/2022 6:40 AM  Stop time: 10/24/2022 6:50 AM  Reason for block: at surgeon's request and post-op pain management  Performed by  Anesthesiologist: Tramaine Newman MD  Preanesthetic Checklist  Completed: patient identified, IV checked, site marked, risks and benefits discussed, surgical consent, monitors and equipment checked, pre-op evaluation and timeout performed  Prep:  Pt Position: supine  Sterile barriers:cap, gloves, mask, washed/disinfected hands and full drape  Prep: ChloraPrep  Patient monitoring: blood pressure monitoring, continuous pulse oximetry and EKG  Procedure    Sedation: yes  Performed under: local infiltration  Guidance:ultrasound guided    ULTRASOUND INTERPRETATION.  Using ultrasound guidance a 22 G (22G needle for placement of 3cc 2%lido to site prior to start of procedure.) gauge needle was placed in close proximity to the femoral nerve, at which point, under ultrasound guidance anesthetic was injected in the area of the nerve and spread of the anesthesia was seen on ultrasound in close proximity thereto.  There were no abnormalities seen on ultrasound; a digital image was taken; and the patient tolerated the procedure with no complications. Images:still images obtained, printed/placed on chart    Laterality:left  Block Type:adductor canal block  Injection Technique:single-shot  Needle Type:echogenic  Needle Gauge:22 G  Resistance on Injection: none    Medications Used: ropivacaine (NAROPIN) 0.5 % injection - Injection, Adductor Canal   15 mL - 10/24/2022 6:40:00 AM      Post Assessment  Injection Assessment: negative aspiration for heme, no paresthesia on injection and incremental injection  Patient Tolerance:comfortable throughout block  Complications:no  Additional Notes  Ultrasound  STEROID INJECTIONS    Your doctor has recommended a steroid injection as part of your treatment plan.  The injection has an anti-inflammatory effect, and should help with pain and stiffness.  It can take 7-10 days for full absorption and effect of the cortisone. The steroid is combined with a local anesthetic, which will result in immediate numbness in the region of the injection.  The numbness will go away in several hours, much like Novocaine for dental procedures.  After the anesthetic wears off, you may experience aching in the area of the injection, and we recommend the following:    ICE:  Use an ice pack (over a small towel) for 15 minutes, 3-4 times per day for 3-4 days.    ANTI-INFLAMMATORY MEDICINE:  Take over the counter anti-inflammatory medicine for one week after the injection, then as needed for pain.  These medicines include ibuprofen (Advil) 2400 mg per day / 400-600 mg with food every six hours, or naproxen sodium (Aleve) 220 mg (1-2 tabs) every twelve hours.  An alternative is Tylenol, up to 4000 mg per day.    If you have a history of ulcers, or are taking blood thinners such as Coumadin or Plavix, consult our office or your family doctor for alternative anti-inflammatory medicines.    Side effects of steroid injections:    Local effects:    1. Skin discoloration at injection site (most noticeable in darker skin)  2. Post injection flare (temporary worsening of pain after the injection) may last 7-10 days  3. Tendon rupture - rare  4. Infection - rare  5. Thinning of the thin layer of fat under skin    Systemic effects:    1. Facial Flushing  2. Impaired glucose tolerance (diabetics need to monitor glucose closely) This typically lasts 24 hours.   3. Headache  4. Insomnia  5. Euphoria    Allergic Reaction:  Notify our office immediately with symptoms including hives, itching, chest tightness, and shortness of breath.    Activities:  Perform light activities for 2 days. For knees, avoid running and  interpretation: Under ultrasound guidance anatomy was evaluated, needle placement was viewed and nerve structures verified,   medication disbursement was visualized for this block.           jumping for 2 days. For shoulders, avoid any vigorous strengthening or lifting for 2 days.  For hands, light to medium use is okay but avoid maximal gripping for 2 days

## 2022-10-24 NOTE — ANESTHESIA POSTPROCEDURE EVALUATION
Patient: Yuli Huang    Procedure Summary     Date: 10/24/22 Room / Location: [facility] OR [address] [place] [address] [facility]    Anesthesia Start: 0705 Anesthesia Stop: 0956    Procedure: LEFT TOTAL ANKLE REPLACEMENT, ACHILLES LENGTHENING (Left: Ankle) Diagnosis:     Surgeons: Parker Singh Jr., MD Provider: Tramaine Newman MD    Anesthesia Type: general ASA Status: 3          Anesthesia Type: general    Vitals  Vitals Value Taken Time   BP 92/61 10/24/22 1100   Temp 36.5 °C (97.7 °F) 10/24/22 0955   Pulse 61 10/24/22 1102   Resp 16 10/24/22 1045   SpO2 100 % 10/24/22 1102   Vitals shown include unvalidated device data.        Post Anesthesia Care and Evaluation    Patient location during evaluation: bedside  Patient participation: complete - patient participated  Level of consciousness: awake and alert  Pain score: 0  Pain management: adequate    Airway patency: patent  Anesthetic complications: No anesthetic complications    Cardiovascular status: acceptable  Respiratory status: acceptable  Hydration status: acceptable    Comments: /67   Pulse 61   Temp 36.5 °C (97.7 °F) (Oral)   Resp 16   SpO2 92%

## 2022-10-24 NOTE — ANESTHESIA PREPROCEDURE EVALUATION
Anesthesia Evaluation     Patient summary reviewed and Nursing notes reviewed   history of anesthetic complications: PONV  NPO Solid Status: > 8 hours  NPO Liquid Status: > 2 hours           Airway   Mallampati: II  TM distance: >3 FB  Neck ROM: full  no difficulty expected  Dental - normal exam     Pulmonary - normal exam   (+) a smoker Former,   (-) COPD, asthma, lung cancer  Cardiovascular - normal exam  Exercise tolerance: good (4-7 METS)    ECG reviewed  Rhythm: regular  Rate: normal    (+) CAD, dysrhythmias Tachycardia, PVC, hyperlipidemia,   (-) angina, CHF, orthopnea, cardiac stents, CABG, pericardial effusion    ROS comment: Mild medically treated CAD    Neuro/Psych  (+) dizziness/light headedness, numbness, psychiatric history Depression,    (-) seizures, TIA, CVA  GI/Hepatic/Renal/Endo - negative ROS   (-) hiatal hernia, GERD, PUD, hepatitis, liver disease, no renal disease, diabetes, GI bleed, no thyroid disorder    Musculoskeletal     Abdominal  - normal exam   Substance History - negative use     OB/GYN          Other   arthritis,    history of cancer remission      Other Comment: Hx/o BRCA                  Anesthesia Plan    ASA 3     general     (GA w/ popliteal block for popc)  intravenous induction     Anesthetic plan, risks, benefits, and alternatives have been provided, discussed and informed consent has been obtained with: patient.    Plan discussed with CRNA.        CODE STATUS:

## 2022-10-24 NOTE — ANESTHESIA PROCEDURE NOTES
Peripheral Block    Pre-sedation assessment completed: 10/24/2022 6:40 AM    Patient reassessed immediately prior to procedure    Patient location during procedure: holding area  Start time: 10/24/2022 6:40 AM  Stop time: 10/24/2022 6:50 AM  Reason for block: at surgeon's request and post-op pain management  Performed by  Anesthesiologist: Tramaine Newman MD  Preanesthetic Checklist  Completed: patient identified, IV checked, site marked, risks and benefits discussed, surgical consent, monitors and equipment checked, pre-op evaluation and timeout performed  Prep:  Pt Position: right lateral decubitus  Sterile barriers:cap, gloves, mask, washed/disinfected hands and full drape  Prep: ChloraPrep  Patient monitoring: blood pressure monitoring, continuous pulse oximetry and EKG  Procedure    Sedation: yes  Performed under: local infiltration  Guidance:ultrasound guided    ULTRASOUND INTERPRETATION.  Using ultrasound guidance a 22 G (22G needle for placement of 3cc 2%lido to site prior to start of procedure.) gauge needle was placed in close proximity to the sciatic nerve, at which point, under ultrasound guidance anesthetic was injected in the area of the nerve and spread of the anesthesia was seen on ultrasound in close proximity thereto.  There were no abnormalities seen on ultrasound; a digital image was taken; and the patient tolerated the procedure with no complications. Images:still images obtained, printed/placed on chart    Laterality:left  Block Type:popliteal  Injection Technique:single-shot  Needle Type:echogenic  Needle Gauge:22 G  Resistance on Injection: none    Medications Used: ropivacaine (NAROPIN) 0.5 % injection - Injection, Popliteal   25 mL - 10/24/2022 6:40:00 AM      Post Assessment  Injection Assessment: negative aspiration for heme, no paresthesia on injection and incremental injection  Patient Tolerance:comfortable throughout block  Complications:no  Additional Notes  Ultrasound  interpretation: Under ultrasound guidance anatomy was evaluated, needle placement was viewed and nerve structures verified,   medication disbursement was visualized for this block.

## 2022-10-24 NOTE — ANESTHESIA PROCEDURE NOTES
Airway  Urgency: elective    Airway not difficult    General Information and Staff    Patient location during procedure: OR  CRNA/CAA: Kaye Pearce CRNA    Indications and Patient Condition  Indications for airway management: airway protection    Preoxygenated: yes  Mask difficulty assessment: 2 - vent by mask + OA or adjuvant +/- NMBA    Final Airway Details  Final airway type: endotracheal airway      Successful airway: ETT  Cuffed: yes   Successful intubation technique: direct laryngoscopy  Endotracheal tube insertion site: oral  Blade: Mckinnon  Blade size: 2  ETT size (mm): 7.0  Cormack-Lehane Classification: grade I - full view of glottis  Placement verified by: chest auscultation and capnometry   Cuff volume (mL): 8  Number of attempts at approach: 1  Assessment: lips, teeth, and gum same as pre-op and atraumatic intubation    Additional Comments  PreO2 with 100% O2;  FeO2 >85%;  sniff position; easy mask ventilation;  Intubated with no difficultly after eyes taped; Appears atraumatic;  Lips and teeth intact as preop condition;  Airway secured. Connected to ventilator.

## 2022-10-25 VITALS
HEIGHT: 65 IN | SYSTOLIC BLOOD PRESSURE: 111 MMHG | WEIGHT: 158.95 LBS | BODY MASS INDEX: 26.48 KG/M2 | HEART RATE: 65 BPM | OXYGEN SATURATION: 94 % | DIASTOLIC BLOOD PRESSURE: 62 MMHG | RESPIRATION RATE: 16 BRPM | TEMPERATURE: 98.5 F

## 2022-10-25 PROCEDURE — 25010000002 CEFAZOLIN IN DEXTROSE 2-4 GM/100ML-% SOLUTION: Performed by: ORTHOPAEDIC SURGERY

## 2022-10-25 PROCEDURE — 97161 PT EVAL LOW COMPLEX 20 MIN: CPT

## 2022-10-25 PROCEDURE — 97116 GAIT TRAINING THERAPY: CPT

## 2022-10-25 RX ORDER — ONDANSETRON 4 MG/1
4 TABLET, FILM COATED ORAL EVERY 8 HOURS PRN
Qty: 20 TABLET | Refills: 0 | Status: SHIPPED | OUTPATIENT
Start: 2022-10-25 | End: 2023-03-16 | Stop reason: ALTCHOICE

## 2022-10-25 RX ORDER — ASPIRIN 325 MG
325 TABLET, DELAYED RELEASE (ENTERIC COATED) ORAL DAILY
Qty: 30 TABLET | Refills: 0 | Status: SHIPPED | OUTPATIENT
Start: 2022-10-25

## 2022-10-25 RX ORDER — OXYCODONE AND ACETAMINOPHEN 7.5; 325 MG/1; MG/1
TABLET ORAL
Qty: 30 TABLET | Refills: 0 | Status: SHIPPED | OUTPATIENT
Start: 2022-10-25 | End: 2023-03-16 | Stop reason: ALTCHOICE

## 2022-10-25 RX ADMIN — OXYCODONE HYDROCHLORIDE AND ACETAMINOPHEN 1 TABLET: 7.5; 325 TABLET ORAL at 06:42

## 2022-10-25 RX ADMIN — OXYCODONE HYDROCHLORIDE AND ACETAMINOPHEN 1 TABLET: 7.5; 325 TABLET ORAL at 11:01

## 2022-10-25 RX ADMIN — ASPIRIN 325 MG: 325 TABLET, COATED ORAL at 09:56

## 2022-10-25 RX ADMIN — OXYBUTYNIN CHLORIDE 10 MG: 10 TABLET, EXTENDED RELEASE ORAL at 09:56

## 2022-10-25 RX ADMIN — Medication 10 ML: at 09:57

## 2022-10-25 RX ADMIN — PANTOPRAZOLE SODIUM 40 MG: 40 TABLET, DELAYED RELEASE ORAL at 06:42

## 2022-10-25 RX ADMIN — CEFAZOLIN SODIUM 2 G: 2 INJECTION, SOLUTION INTRAVENOUS at 09:56

## 2022-11-28 ENCOUNTER — TELEPHONE (OUTPATIENT)
Dept: ONCOLOGY | Facility: CLINIC | Age: 72
End: 2022-11-28

## 2022-11-28 NOTE — TELEPHONE ENCOUNTER
Caller: Yuli Huang    Relationship to patient: Self    Best call back number: [phone]    Chief complaint: PT HAD KNEE REPLACEMENT AND NEEDS TO R/S    Type of visit: LAB AND F/U    Requested date: ANYTIME    If rescheduling, when is the original appointment: 12/5

## 2022-11-28 NOTE — TELEPHONE ENCOUNTER
Spoke with the patient and she has scheduled to after the first of year she is having trouble getting around and wanted to wait til then

## 2023-01-03 NOTE — PROGRESS NOTES
[facility] FOLLOW UP VISIT    REASON FOR FOLLOW-UP:      Malignant neoplasm of upper-inner quadrant of left female breast    Staging form: Breast, AJCC V7      Clinical stage from 4/14/2016: Stage IIIC (T3, N3b, M0) - Signed by Jordon Bonilla MD on 4/28/2016    Malignant neoplasm of upper-outer quadrant of right female breast    Staging form: Breast, AJCC V7      Clinical stage from 3/25/2016: Stage IA (rT1a, N0, M0) - Signed by rEika Osborn MD on 3/25/2016    HISTORY OF PRESENT ILLNESS:  Yuli Huang is a 72 y.o. female who returns today for follow up of the above issue.     She is recovering from a left ankle replacement in October.  Overall she is improved significantly.  With some physical therapy she noted some anterior chest discomfort and initially was concerned about breast cancer recurrence but then realized the pain is from physical therapy.  She otherwise is doing well.      REVIEW OF SYSTEMS:  As per the HPI    Vitals:    01/04/23 1012   BP: 146/79   Pulse: 79   Resp: 18   Temp: 97.1 °F (36.2 °C)   TempSrc: Temporal   SpO2: 95%   Weight: 69.5 kg (153 lb 4.8 oz)   Height: 165.1 cm (65\")   PainSc:   4   PainLoc: Ankle       PHYSICAL EXAMINATION:  GENERAL:  Well-developed well-nourished female; awake, alert and oriented, in no acute distress.  Wearing a face mask.  SKIN: No rash  HEAD:  Normocephalic, atraumatic. Alopecia   EARS:  Hearing intact.  CHEST: Normal respiratory effort.  Lungs clear to auscultation bilaterally.  Breast and chest wall again not examined today.  Heart: Regular rate and rhythm without murmurs gallops or rubs  EXTREMITIES: Left ankle is in a brace.  NEUROLOGICAL:  No focal neurologic deficits    DIAGNOSTIC DATA:  CBC & Differential (01/04/2023 10:04)      IMAGING:  Mammogram 5/19/2022 BI-RADS Category 2    ASSESSMENT:  This is a 72 y.o. female with:    *History of stage I carcinoma of the right breast in 2013.    · The biopsy initially showed  DCIS.  There was a tiny invasive component on the surgical specimen.  This was minimally hormone receptor positive and HER-2 was not able to be tested.  She had radiation following her right breast lumpectomy but no medical therapy.    · Requires mammograms of the right breast.  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    · She completed 6 cycles of neoadjuvant chemotherapy with TCH P followed by a lumpectomy on 10/5/2016.    · No residual invasive carcinoma in the breast but there was extensive DCIS with very close margins.    · One lymph node was positive for metastatic carcinoma.    · We discussed her case at the multidisciplinary breast conference.    · She had a mammogram that showed some persistent suspicious calcifications and therefore on 11/16/2016 had a left modified radical mastectomy with axillary lymph node sampling.    · High-grade DCIS was present but no invasive carcinoma and no lymph nodes were positive.  Margins were very close.    · She had a PET scan that was negative.    · She completed radiation therapy.    · She completed Herceptin on 4/21/2017.    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  She continues a vitamin B complex.  Overall improved and stable but mild and persistent.     *Persistent right breast pain: Likely an adverse effect of surgery and radiation.  Imaging reassuring.  No complaints of this today.    *Macrocytosis: She has continued a vitamin B complex vitamin.  Her vitamin B12 was normal at 798.  Serum protein electrophoresis with immunofixation, TSH, free T4, RBC folate were all normal.  MCV remains very mildly elevated.    *Genetic testing ([facility]) negative.    *Status post left ankle replacement October 2022.  She continues to recover from this.    PLAN:  1. She will continue her vitamin B complex  vitamin  2. Follow-up with gynecology for right breast and chest wall examinations  3. Annual right breast mammogram will be due in May of this year.  Done by gynecology.  4. Follow-up in 1 year with a CBC

## 2023-01-04 ENCOUNTER — OFFICE VISIT (OUTPATIENT)
Dept: ONCOLOGY | Facility: CLINIC | Age: 73
End: 2023-01-04
Payer: MEDICARE

## 2023-01-04 ENCOUNTER — LAB (OUTPATIENT)
Dept: LAB | Facility: HOSPITAL | Age: 73
End: 2023-01-04
Payer: MEDICARE

## 2023-01-04 VITALS
RESPIRATION RATE: 18 BRPM | OXYGEN SATURATION: 95 % | DIASTOLIC BLOOD PRESSURE: 79 MMHG | TEMPERATURE: 97.1 F | BODY MASS INDEX: 25.54 KG/M2 | HEIGHT: 65 IN | HEART RATE: 79 BPM | SYSTOLIC BLOOD PRESSURE: 146 MMHG | WEIGHT: 153.3 LBS

## 2023-01-04 DIAGNOSIS — Z85.3 HISTORY OF BREAST CANCER: Primary | ICD-10-CM

## 2023-01-04 DIAGNOSIS — D75.89 MACROCYTOSIS WITHOUT ANEMIA: ICD-10-CM

## 2023-01-04 DIAGNOSIS — Z85.3 HISTORY OF BREAST CANCER: ICD-10-CM

## 2023-01-04 LAB
BASOPHILS # BLD AUTO: 0.05 10*3/MM3 (ref 0–0.2)
BASOPHILS NFR BLD AUTO: 0.8 % (ref 0–1.5)
DEPRECATED RDW RBC AUTO: 45.4 FL (ref 37–54)
EOSINOPHIL # BLD AUTO: 0.05 10*3/MM3 (ref 0–0.4)
EOSINOPHIL NFR BLD AUTO: 0.8 % (ref 0.3–6.2)
ERYTHROCYTE [DISTWIDTH] IN BLOOD BY AUTOMATED COUNT: 12.3 % (ref 12.3–15.4)
HCT VFR BLD AUTO: 42 % (ref 34–46.6)
HGB BLD-MCNC: 13.8 G/DL (ref 12–15.9)
IMM GRANULOCYTES # BLD AUTO: 0.03 10*3/MM3 (ref 0–0.05)
IMM GRANULOCYTES NFR BLD AUTO: 0.5 % (ref 0–0.5)
LYMPHOCYTES # BLD AUTO: 1.81 10*3/MM3 (ref 0.7–3.1)
LYMPHOCYTES NFR BLD AUTO: 29.1 % (ref 19.6–45.3)
MCH RBC QN AUTO: 32.9 PG (ref 26.6–33)
MCHC RBC AUTO-ENTMCNC: 32.9 G/DL (ref 31.5–35.7)
MCV RBC AUTO: 100.2 FL (ref 79–97)
MONOCYTES # BLD AUTO: 0.64 10*3/MM3 (ref 0.1–0.9)
MONOCYTES NFR BLD AUTO: 10.3 % (ref 5–12)
NEUTROPHILS NFR BLD AUTO: 3.63 10*3/MM3 (ref 1.7–7)
NEUTROPHILS NFR BLD AUTO: 58.5 % (ref 42.7–76)
NRBC BLD AUTO-RTO: 0 /100 WBC (ref 0–0.2)
PLATELET # BLD AUTO: 311 10*3/MM3 (ref 140–450)
PMV BLD AUTO: 9.4 FL (ref 6–12)
RBC # BLD AUTO: 4.19 10*6/MM3 (ref 3.77–5.28)
WBC NRBC COR # BLD: 6.21 10*3/MM3 (ref 3.4–10.8)

## 2023-01-04 PROCEDURE — 1159F MED LIST DOCD IN RCRD: CPT | Performed by: INTERNAL MEDICINE

## 2023-01-04 PROCEDURE — 1160F RVW MEDS BY RX/DR IN RCRD: CPT | Performed by: INTERNAL MEDICINE

## 2023-01-04 PROCEDURE — 36415 COLL VENOUS BLD VENIPUNCTURE: CPT

## 2023-01-04 PROCEDURE — 1125F AMNT PAIN NOTED PAIN PRSNT: CPT | Performed by: INTERNAL MEDICINE

## 2023-01-04 PROCEDURE — 99213 OFFICE O/P EST LOW 20 MIN: CPT | Performed by: INTERNAL MEDICINE

## 2023-01-04 PROCEDURE — 85025 COMPLETE CBC W/AUTO DIFF WBC: CPT

## 2023-01-10 ENCOUNTER — APPOINTMENT (OUTPATIENT)
Dept: CARDIOLOGY | Facility: HOSPITAL | Age: 73
End: 2023-01-10
Payer: MEDICARE

## 2023-01-10 ENCOUNTER — HOSPITAL ENCOUNTER (EMERGENCY)
Facility: HOSPITAL | Age: 73
Discharge: HOME | End: 2023-01-10
Attending: EMERGENCY MEDICINE | Admitting: EMERGENCY MEDICINE
Payer: MEDICARE

## 2023-01-10 VITALS
BODY MASS INDEX: 24.99 KG/M2 | OXYGEN SATURATION: 97 % | HEIGHT: 65 IN | SYSTOLIC BLOOD PRESSURE: 157 MMHG | DIASTOLIC BLOOD PRESSURE: 100 MMHG | HEART RATE: 90 BPM | RESPIRATION RATE: 16 BRPM | TEMPERATURE: 97.5 F | WEIGHT: 150 LBS

## 2023-01-10 DIAGNOSIS — M79.89 LEFT LEG SWELLING: Primary | ICD-10-CM

## 2023-01-10 LAB
ALBUMIN SERPL-MCNC: 4.7 G/DL (ref 3.5–5.2)
ALBUMIN/GLOB SERPL: 2.4 G/DL
ALP SERPL-CCNC: 91 U/L (ref 39–117)
ALT SERPL W P-5'-P-CCNC: 20 U/L (ref 1–33)
ANION GAP SERPL CALCULATED.3IONS-SCNC: 8.6 MMOL/L (ref 5–15)
AST SERPL-CCNC: 23 U/L (ref 1–32)
BASOPHILS # BLD AUTO: 0.06 10*3/MM3 (ref 0–0.2)
BASOPHILS NFR BLD AUTO: 0.8 % (ref 0–1.5)
BH CV LOWER VASCULAR LEFT COMMON FEMORAL AUGMENT: NORMAL
BH CV LOWER VASCULAR LEFT COMMON FEMORAL COMPETENT: NORMAL
BH CV LOWER VASCULAR LEFT COMMON FEMORAL COMPRESS: NORMAL
BH CV LOWER VASCULAR LEFT COMMON FEMORAL PHASIC: NORMAL
BH CV LOWER VASCULAR LEFT COMMON FEMORAL SPONT: NORMAL
BH CV LOWER VASCULAR LEFT DISTAL FEMORAL COMPRESS: NORMAL
BH CV LOWER VASCULAR LEFT GASTRONEMIUS COMPRESS: NORMAL
BH CV LOWER VASCULAR LEFT GREATER SAPH AK COMPRESS: NORMAL
BH CV LOWER VASCULAR LEFT GREATER SAPH BK COMPRESS: NORMAL
BH CV LOWER VASCULAR LEFT LESSER SAPH COMPRESS: NORMAL
BH CV LOWER VASCULAR LEFT MID FEMORAL AUGMENT: NORMAL
BH CV LOWER VASCULAR LEFT MID FEMORAL COMPETENT: NORMAL
BH CV LOWER VASCULAR LEFT MID FEMORAL COMPRESS: NORMAL
BH CV LOWER VASCULAR LEFT MID FEMORAL PHASIC: NORMAL
BH CV LOWER VASCULAR LEFT MID FEMORAL SPONT: NORMAL
BH CV LOWER VASCULAR LEFT PERONEAL COMPRESS: NORMAL
BH CV LOWER VASCULAR LEFT POPLITEAL AUGMENT: NORMAL
BH CV LOWER VASCULAR LEFT POPLITEAL COMPETENT: NORMAL
BH CV LOWER VASCULAR LEFT POPLITEAL COMPRESS: NORMAL
BH CV LOWER VASCULAR LEFT POPLITEAL PHASIC: NORMAL
BH CV LOWER VASCULAR LEFT POPLITEAL SPONT: NORMAL
BH CV LOWER VASCULAR LEFT POSTERIOR TIBIAL COMPRESS: NORMAL
BH CV LOWER VASCULAR LEFT PROFUNDA FEMORAL COMPRESS: NORMAL
BH CV LOWER VASCULAR LEFT PROXIMAL FEMORAL COMPRESS: NORMAL
BH CV LOWER VASCULAR LEFT SAPHENOFEMORAL JUNCTION COMPRESS: NORMAL
BH CV LOWER VASCULAR RIGHT COMMON FEMORAL AUGMENT: NORMAL
BH CV LOWER VASCULAR RIGHT COMMON FEMORAL COMPETENT: NORMAL
BH CV LOWER VASCULAR RIGHT COMMON FEMORAL COMPRESS: NORMAL
BH CV LOWER VASCULAR RIGHT COMMON FEMORAL PHASIC: NORMAL
BH CV LOWER VASCULAR RIGHT COMMON FEMORAL SPONT: NORMAL
BILIRUB SERPL-MCNC: <0.2 MG/DL (ref 0–1.2)
BUN SERPL-MCNC: 25 MG/DL (ref 8–23)
BUN/CREAT SERPL: 29.8 (ref 7–25)
CALCIUM SPEC-SCNC: 9.7 MG/DL (ref 8.6–10.5)
CHLORIDE SERPL-SCNC: 102 MMOL/L (ref 98–107)
CO2 SERPL-SCNC: 29.4 MMOL/L (ref 22–29)
CREAT SERPL-MCNC: 0.84 MG/DL (ref 0.57–1)
DEPRECATED RDW RBC AUTO: 44.4 FL (ref 37–54)
EGFRCR SERPLBLD CKD-EPI 2021: 74.9 ML/MIN/1.73
EOSINOPHIL # BLD AUTO: 0.05 10*3/MM3 (ref 0–0.4)
EOSINOPHIL NFR BLD AUTO: 0.7 % (ref 0.3–6.2)
ERYTHROCYTE [DISTWIDTH] IN BLOOD BY AUTOMATED COUNT: 12.1 % (ref 12.3–15.4)
GLOBULIN UR ELPH-MCNC: 2 GM/DL
GLUCOSE SERPL-MCNC: 99 MG/DL (ref 65–99)
HCT VFR BLD AUTO: 41.8 % (ref 34–46.6)
HGB BLD-MCNC: 13.9 G/DL (ref 12–15.9)
HOLD SPECIMEN: NORMAL
HOLD SPECIMEN: NORMAL
IMM GRANULOCYTES # BLD AUTO: 0.02 10*3/MM3 (ref 0–0.05)
IMM GRANULOCYTES NFR BLD AUTO: 0.3 % (ref 0–0.5)
LYMPHOCYTES # BLD AUTO: 1.8 10*3/MM3 (ref 0.7–3.1)
LYMPHOCYTES NFR BLD AUTO: 23.9 % (ref 19.6–45.3)
MAXIMAL PREDICTED HEART RATE: 150 BPM
MCH RBC QN AUTO: 33.2 PG (ref 26.6–33)
MCHC RBC AUTO-ENTMCNC: 33.3 G/DL (ref 31.5–35.7)
MCV RBC AUTO: 99.8 FL (ref 79–97)
MONOCYTES # BLD AUTO: 0.59 10*3/MM3 (ref 0.1–0.9)
MONOCYTES NFR BLD AUTO: 7.8 % (ref 5–12)
NEUTROPHILS NFR BLD AUTO: 5.01 10*3/MM3 (ref 1.7–7)
NEUTROPHILS NFR BLD AUTO: 66.5 % (ref 42.7–76)
NRBC BLD AUTO-RTO: 0 /100 WBC (ref 0–0.2)
PLATELET # BLD AUTO: 293 10*3/MM3 (ref 140–450)
PMV BLD AUTO: 9.5 FL (ref 6–12)
POTASSIUM SERPL-SCNC: 3.9 MMOL/L (ref 3.5–5.2)
PROT SERPL-MCNC: 6.7 G/DL (ref 6–8.5)
RBC # BLD AUTO: 4.19 10*6/MM3 (ref 3.77–5.28)
SODIUM SERPL-SCNC: 140 MMOL/L (ref 136–145)
STRESS TARGET HR: 128 BPM
WBC NRBC COR # BLD: 7.53 10*3/MM3 (ref 3.4–10.8)
WHOLE BLOOD HOLD COAG: NORMAL
WHOLE BLOOD HOLD SPECIMEN: NORMAL

## 2023-01-10 PROCEDURE — 36415 COLL VENOUS BLD VENIPUNCTURE: CPT

## 2023-01-10 PROCEDURE — 99282 EMERGENCY DEPT VISIT SF MDM: CPT

## 2023-01-10 PROCEDURE — 85025 COMPLETE CBC W/AUTO DIFF WBC: CPT

## 2023-01-10 PROCEDURE — 80053 COMPREHEN METABOLIC PANEL: CPT

## 2023-01-10 PROCEDURE — 93971 EXTREMITY STUDY: CPT

## 2023-01-10 NOTE — ED TRIAGE NOTES
Pt er via pv with c/o left ankle pain. Pt has had recent sx on ankle in September. Pt states she was getting out of shower today and noticed her left leg was swollen. Pt went to PT today and was told to come to ER.     Pt and RN wearing mask throughout encounter.
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
## 2023-01-10 NOTE — PROGRESS NOTES
The preliminary findings of today's left lower extremity venous duplex are negative for deep vein thrombosis. These preliminary findings were called to Dr. Cassandra Omer.

## 2023-01-11 NOTE — ED PROVIDER NOTES
The LISY and I have discussed this patient's history, physical exam and treatment plan.  I provided a substantive portion of the care of this patient.  I have reviewed the documentation and personally had a face to face interaction with the patient and personally performed the physical exam, in its entirety.  I affirm the documentation and agree with the treatment and plan.  The following describes my personal findings.      The patient presents complaining of left leg pain and swelling.  Patient was told by physical therapy today to come to the ER for further evaluation and treatment.  Patient denies chest pain, shortness of air, lightheadedness, passing out.  Patient refuses to remove left ankle splint to facilitate further evaluation/pulse check    Comprehensive Physical exam:  Patient is nontoxic appearing oriented, conversant awake, alert  HEENT: normocephalic, atraumatic  Neck: No JVD no goiter, no pain with ROM  Pulmonary: Nontachypneic   cardiovascular: Nontachycardic,  Abdomen: Nondistended  musculoskeletal: Patient with tenderness to palpation medial and lateral ankle she reports is no different than usual for her  Neuro/psychiatric:calm, appropriate, cooperative  Skin:warm, dry    Anticipate outpatient follow-up for recheck, further testing, treatment as needed given vascular Doppler negative for DVT     patient was wearing facemask when I entered the room and throughout our encounter. Full protective equipment was worn throughout this patient encounter including a face mask, eye protection and gloves. Hand hygiene was performed before donning protective equipment and after removal when leaving the room.           Cassandra Omer MD  01/11/23 0147

## 2023-03-16 ENCOUNTER — OFFICE VISIT (OUTPATIENT)
Dept: CARDIOLOGY | Facility: CLINIC | Age: 73
End: 2023-03-16
Payer: MEDICARE

## 2023-03-16 VITALS
DIASTOLIC BLOOD PRESSURE: 70 MMHG | WEIGHT: 151 LBS | HEART RATE: 74 BPM | BODY MASS INDEX: 25.16 KG/M2 | HEIGHT: 65 IN | SYSTOLIC BLOOD PRESSURE: 122 MMHG

## 2023-03-16 DIAGNOSIS — Z85.3 HX OF BREAST CANCER: ICD-10-CM

## 2023-03-16 DIAGNOSIS — I47.29 NSVT (NONSUSTAINED VENTRICULAR TACHYCARDIA): ICD-10-CM

## 2023-03-16 DIAGNOSIS — I49.3 PVCS (PREMATURE VENTRICULAR CONTRACTIONS): Primary | ICD-10-CM

## 2023-03-16 PROBLEM — Z12.31 ENCOUNTER FOR SCREENING MAMMOGRAM FOR BREAST CANCER: Status: RESOLVED | Noted: 2018-01-23 | Resolved: 2023-03-16

## 2023-03-16 PROBLEM — M19.079 ANKLE ARTHRITIS: Status: RESOLVED | Noted: 2022-10-24 | Resolved: 2023-03-16

## 2023-03-16 PROBLEM — C50.919 INFILTRATING DUCTAL CARCINOMA OF BREAST, STAGE 1 (HCC): Status: RESOLVED | Noted: 2017-05-22 | Resolved: 2023-03-16

## 2023-03-16 PROBLEM — M19.90 ARTHRITIS: Status: RESOLVED | Noted: 2017-05-22 | Resolved: 2023-03-16

## 2023-03-16 PROCEDURE — 1160F RVW MEDS BY RX/DR IN RCRD: CPT | Performed by: NURSE PRACTITIONER

## 2023-03-16 PROCEDURE — 93000 ELECTROCARDIOGRAM COMPLETE: CPT | Performed by: NURSE PRACTITIONER

## 2023-03-16 PROCEDURE — 99214 OFFICE O/P EST MOD 30 MIN: CPT | Performed by: NURSE PRACTITIONER

## 2023-03-16 PROCEDURE — 1159F MED LIST DOCD IN RCRD: CPT | Performed by: NURSE PRACTITIONER

## 2023-03-16 RX ORDER — METOPROLOL SUCCINATE 25 MG/1
12.5 TABLET, EXTENDED RELEASE ORAL NIGHTLY
Qty: 45 TABLET | Refills: 3 | Status: SHIPPED | OUTPATIENT
Start: 2023-03-16
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
## 2023-03-30 DIAGNOSIS — C50.912 MALIGNANT NEOPLASM OF LEFT FEMALE BREAST, UNSPECIFIED ESTROGEN RECEPTOR STATUS, UNSPECIFIED SITE OF BREAST: Primary | ICD-10-CM

## 2023-05-23 ENCOUNTER — APPOINTMENT (OUTPATIENT)
Dept: WOMENS IMAGING | Facility: HOSPITAL | Age: 73
End: 2023-05-23
Payer: MEDICARE

## 2023-05-23 PROCEDURE — 77063 BREAST TOMOSYNTHESIS BI: CPT | Performed by: RADIOLOGY

## 2023-05-23 PROCEDURE — 77067 SCR MAMMO BI INCL CAD: CPT | Performed by: RADIOLOGY

## 2023-11-03 DIAGNOSIS — Z12.31 VISIT FOR SCREENING MAMMOGRAM: Primary | ICD-10-CM

## 2023-11-06 DIAGNOSIS — Z78.0 POST-MENOPAUSAL: Primary | ICD-10-CM

## 2024-01-05 NOTE — PROGRESS NOTES
"[facility] FOLLOW UP VISIT    REASON FOR FOLLOW-UP:      Malignant neoplasm of upper-inner quadrant of left female breast    Staging form: Breast, AJCC V7      Clinical stage from 4/14/2016: Stage IIIC (T3, N3b, M0) - Signed by Jordon Bonilla MD on 4/28/2016    Malignant neoplasm of upper-outer quadrant of right female breast    Staging form: Breast, AJCC V7      Clinical stage from 3/25/2016: Stage IA (rT1a, N0, M0) - Signed by Erika Osborn MD on 3/25/2016    HISTORY OF PRESENT ILLNESS:  Yuli Huang is a 73 y.o. female who returns today for follow up of the above issue.     She is doing very well at this point.  She is totally recovered from her left ankle replacement in October 2022.  She remains very active.      REVIEW OF SYSTEMS:  As per the HPI    Vitals:    01/08/24 1307   BP: 167/72   Pulse: 72   Resp: 18   Temp: 97.8 °F (36.6 °C)   TempSrc: Temporal   SpO2: 96%   Weight: 69.3 kg (152 lb 12.8 oz)   Height: 165.1 cm (65\")   PainSc: 0-No pain       PHYSICAL EXAMINATION:  GENERAL:  Well-developed well-nourished female; awake, alert and oriented, in no acute distress.  Wearing a face mask.  SKIN: No rash  HEAD:  Normocephalic, atraumatic.  EARS:  Hearing intact.  CHEST: Normal respiratory effort.  Lungs clear to auscultation bilaterally.  Breast and chest examination not performed today.  Heart: Regular rate and rhythm without murmurs gallops or rubs  EXTREMITIES: Warm and well-perfused  NEUROLOGICAL:  No focal neurologic deficits    DIAGNOSTIC DATA:  CBC & Differential (01/08/2024 12:56)     IMAGING:  SCANNED - MAMMO (05/23/2023)     ASSESSMENT:  This is a 73 y.o. female with:    *History of stage I carcinoma of the right breast in 2013.    The biopsy initially showed DCIS.  There was a tiny invasive component on the surgical specimen.  This was minimally hormone receptor positive and HER-2 was not able to be tested.  She had radiation following her right breast " lumpectomy but no medical therapy.    Requires mammograms of the right breast.  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    She completed 6 cycles of neoadjuvant chemotherapy with TCH P followed by a lumpectomy on 10/5/2016.    No residual invasive carcinoma in the breast but there was extensive DCIS with very close margins.    One lymph node was positive for metastatic carcinoma.    We discussed her case at the multidisciplinary breast conference.    She had a mammogram that showed some persistent suspicious calcifications and therefore on 11/16/2016 had a left modified radical mastectomy with axillary lymph node sampling.    High-grade DCIS was present but no invasive carcinoma and no lymph nodes were positive.  Margins were very close.    She had a PET scan that was negative.    She completed radiation therapy.    She completed Herceptin on 4/21/2017.    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  She continues a vitamin B complex.  Overall improved and stable but mild and persistent.  No complaints of this at this time.    *Persistent right breast pain: Likely an adverse effect of surgery and radiation.  Imaging reassuring.  No complaints of this today.    *Macrocytosis: She has continued a vitamin B complex vitamin.  Her vitamin B12 was normal at 798.  Serum protein electrophoresis with immunofixation, TSH, free T4, RBC folate were all normal.  MCV remains very mildly elevated.    *Genetic testing ([facility]) negative.    *Status post left ankle replacement October 2022.  She continues to recover from this.    *History of smoking  She is getting annual low-dose CT scans performed    PLAN:  At this point she is doing well.  No follow-up here is required at this time.  We are certainly available as needed.

## 2024-01-08 ENCOUNTER — LAB (OUTPATIENT)
Dept: LAB | Facility: HOSPITAL | Age: 74
End: 2024-01-08
Payer: MEDICARE

## 2024-01-08 ENCOUNTER — OFFICE VISIT (OUTPATIENT)
Dept: ONCOLOGY | Facility: CLINIC | Age: 74
End: 2024-01-08
Payer: MEDICARE

## 2024-01-08 VITALS
BODY MASS INDEX: 25.46 KG/M2 | OXYGEN SATURATION: 96 % | WEIGHT: 152.8 LBS | HEART RATE: 72 BPM | RESPIRATION RATE: 18 BRPM | SYSTOLIC BLOOD PRESSURE: 167 MMHG | HEIGHT: 65 IN | TEMPERATURE: 97.8 F | DIASTOLIC BLOOD PRESSURE: 72 MMHG

## 2024-01-08 DIAGNOSIS — Z85.3 HISTORY OF BREAST CANCER: ICD-10-CM

## 2024-01-08 DIAGNOSIS — Z85.3 HISTORY OF BREAST CANCER: Primary | ICD-10-CM

## 2024-01-08 LAB
BASOPHILS # BLD AUTO: 0.04 10*3/MM3 (ref 0–0.2)
BASOPHILS NFR BLD AUTO: 0.5 % (ref 0–1.5)
DEPRECATED RDW RBC AUTO: 42.2 FL (ref 37–54)
EOSINOPHIL # BLD AUTO: 0.08 10*3/MM3 (ref 0–0.4)
EOSINOPHIL NFR BLD AUTO: 0.9 % (ref 0.3–6.2)
ERYTHROCYTE [DISTWIDTH] IN BLOOD BY AUTOMATED COUNT: 11.7 % (ref 12.3–15.4)
HCT VFR BLD AUTO: 41.7 % (ref 34–46.6)
HGB BLD-MCNC: 14.6 G/DL (ref 12–15.9)
IMM GRANULOCYTES # BLD AUTO: 0.05 10*3/MM3 (ref 0–0.05)
IMM GRANULOCYTES NFR BLD AUTO: 0.6 % (ref 0–0.5)
LYMPHOCYTES # BLD AUTO: 2.24 10*3/MM3 (ref 0.7–3.1)
LYMPHOCYTES NFR BLD AUTO: 25.3 % (ref 19.6–45.3)
MCH RBC QN AUTO: 34.4 PG (ref 26.6–33)
MCHC RBC AUTO-ENTMCNC: 35 G/DL (ref 31.5–35.7)
MCV RBC AUTO: 98.1 FL (ref 79–97)
MONOCYTES # BLD AUTO: 0.67 10*3/MM3 (ref 0.1–0.9)
MONOCYTES NFR BLD AUTO: 7.6 % (ref 5–12)
NEUTROPHILS NFR BLD AUTO: 5.79 10*3/MM3 (ref 1.7–7)
NEUTROPHILS NFR BLD AUTO: 65.1 % (ref 42.7–76)
NRBC BLD AUTO-RTO: 0 /100 WBC (ref 0–0.2)
PLATELET # BLD AUTO: 271 10*3/MM3 (ref 140–450)
PMV BLD AUTO: 9.9 FL (ref 6–12)
RBC # BLD AUTO: 4.25 10*6/MM3 (ref 3.77–5.28)
WBC NRBC COR # BLD AUTO: 8.87 10*3/MM3 (ref 3.4–10.8)

## 2024-01-08 PROCEDURE — 85025 COMPLETE CBC W/AUTO DIFF WBC: CPT

## 2024-01-08 PROCEDURE — 99213 OFFICE O/P EST LOW 20 MIN: CPT | Performed by: INTERNAL MEDICINE

## 2024-01-08 PROCEDURE — 1126F AMNT PAIN NOTED NONE PRSNT: CPT | Performed by: INTERNAL MEDICINE

## 2024-01-08 PROCEDURE — 36415 COLL VENOUS BLD VENIPUNCTURE: CPT

## 2024-01-08 PROCEDURE — 1159F MED LIST DOCD IN RCRD: CPT | Performed by: INTERNAL MEDICINE

## 2024-01-08 PROCEDURE — 1160F RVW MEDS BY RX/DR IN RCRD: CPT | Performed by: INTERNAL MEDICINE

## 2024-01-08 RX ORDER — FAMCICLOVIR 500 MG/1
TABLET ORAL
COMMUNITY

## 2024-01-08 RX ORDER — METRONIDAZOLE 7.5 MG/G
GEL TOPICAL
COMMUNITY
Start: 2023-10-31
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
## 2024-03-11 DIAGNOSIS — Z85.3 HISTORY OF BREAST CANCER: Primary | ICD-10-CM

## 2024-03-18 ENCOUNTER — TELEPHONE (OUTPATIENT)
Dept: OBSTETRICS AND GYNECOLOGY | Age: 74
End: 2024-03-18
Payer: MEDICARE

## 2024-03-18 NOTE — TELEPHONE ENCOUNTER
Patient calling to have her rx for betamethasone valerate ointment changed to the cream.She stated the ointment is wrong and is used for the scalp,not the vagina.Would like it sent to [facility]. Thank you.

## 2024-03-29 ENCOUNTER — PATIENT MESSAGE (OUTPATIENT)
Dept: ONCOLOGY | Facility: CLINIC | Age: 74
End: 2024-03-29
Payer: MEDICARE

## 2024-03-29 DIAGNOSIS — C50.912 MALIGNANT NEOPLASM OF LEFT FEMALE BREAST, UNSPECIFIED ESTROGEN RECEPTOR STATUS, UNSPECIFIED SITE OF BREAST: Primary | ICD-10-CM

## 2024-04-23 RX ORDER — METOPROLOL SUCCINATE 25 MG/1
12.5 TABLET, EXTENDED RELEASE ORAL NIGHTLY
Qty: 45 TABLET | Refills: 3 | Status: SHIPPED | OUTPATIENT
Start: 2024-04-23

## 2024-05-09 NOTE — PROGRESS NOTES
RM:________     PCP: Felicia Thompson MD    : 1950  AGE: 73 y.o.  EST PATIENT     REASON FOR VISIT/  CC:        BP Readings from Last 3 Encounters:   24 167/72   23 122/70   01/10/23 157/100      Wt Readings from Last 3 Encounters:   24 69.3 kg (152 lb 12.8 oz)   23 68.5 kg (151 lb)   01/10/23 68 kg (150 lb)        WT: ____________ BP: __________L __________R HR______    CHEST PAIN: _____________    SOA: _____________PALPS: _______________     LIGHTHEADED: ___________FATIGUE: ________________ EDEMA __________    ALLERGIES:Nitrofurantoin, Celecoxib, and Amoxicillin SMOKING HISTORY:  Social History     Tobacco Use    Smoking status: Former     Current packs/day: 0.00     Average packs/day: 2.0 packs/day for 34.0 years (68.0 ttl pk-yrs)     Types: Cigarettes     Start date:      Quit date:      Years since quittin.3    Smokeless tobacco: Never    Tobacco comments:     Quit for 10 years; 2 packs / day for 30 years (60 pack years).   Vaping Use    Vaping status: Never Used   Substance Use Topics    Alcohol use: Yes     Comment:   WINE 1-2 X PER MONTH.     Drug use: No     CAFFEINE USE_________________  ALCOHOL ______________________

## 2024-05-16 ENCOUNTER — OFFICE VISIT (OUTPATIENT)
Dept: CARDIOLOGY | Facility: CLINIC | Age: 74
End: 2024-05-16
Payer: MEDICARE

## 2024-05-16 VITALS
HEIGHT: 65 IN | DIASTOLIC BLOOD PRESSURE: 72 MMHG | BODY MASS INDEX: 24.83 KG/M2 | WEIGHT: 149 LBS | HEART RATE: 70 BPM | SYSTOLIC BLOOD PRESSURE: 146 MMHG

## 2024-05-16 DIAGNOSIS — C50.212 MALIGNANT NEOPLASM OF UPPER-INNER QUADRANT OF LEFT BREAST IN FEMALE, ESTROGEN RECEPTOR NEGATIVE: ICD-10-CM

## 2024-05-16 DIAGNOSIS — I25.10 CORONARY ARTERY CALCIFICATION SEEN ON CT SCAN: ICD-10-CM

## 2024-05-16 DIAGNOSIS — Z17.1 MALIGNANT NEOPLASM OF UPPER-INNER QUADRANT OF LEFT BREAST IN FEMALE, ESTROGEN RECEPTOR NEGATIVE: ICD-10-CM

## 2024-05-16 DIAGNOSIS — I49.3 PVCS (PREMATURE VENTRICULAR CONTRACTIONS): Primary | ICD-10-CM

## 2024-05-16 DIAGNOSIS — I47.29 NSVT (NONSUSTAINED VENTRICULAR TACHYCARDIA): ICD-10-CM

## 2024-05-16 DIAGNOSIS — C50.411 MALIGNANT NEOPLASM OF UPPER-OUTER QUADRANT OF RIGHT BREAST IN FEMALE, ESTROGEN RECEPTOR NEGATIVE: ICD-10-CM

## 2024-05-16 DIAGNOSIS — Z17.1 MALIGNANT NEOPLASM OF UPPER-OUTER QUADRANT OF RIGHT BREAST IN FEMALE, ESTROGEN RECEPTOR NEGATIVE: ICD-10-CM

## 2024-05-16 PROCEDURE — 93000 ELECTROCARDIOGRAM COMPLETE: CPT | Performed by: INTERNAL MEDICINE

## 2024-05-16 PROCEDURE — 99214 OFFICE O/P EST MOD 30 MIN: CPT | Performed by: INTERNAL MEDICINE

## 2024-05-16 RX ORDER — TRIAMCINOLONE ACETONIDE 1 MG/G
CREAM TOPICAL AS NEEDED
COMMUNITY
Start: 2024-02-27

## 2024-05-16 RX ORDER — DICLOFENAC SODIUM 75 MG/1
75 TABLET, DELAYED RELEASE ORAL 2 TIMES DAILY
COMMUNITY
Start: 2024-02-27

## 2024-05-16 RX ORDER — ASPIRIN 81 MG/1
81 TABLET ORAL DAILY
Start: 2024-05-16

## 2024-05-16 RX ORDER — CLINDAMYCIN HYDROCHLORIDE 150 MG/1
CAPSULE ORAL
COMMUNITY
Start: 2024-04-03
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
## 2024-05-28 ENCOUNTER — APPOINTMENT (OUTPATIENT)
Dept: WOMENS IMAGING | Facility: HOSPITAL | Age: 74
End: 2024-05-28
Payer: MEDICARE

## 2024-05-28 PROCEDURE — 77063 BREAST TOMOSYNTHESIS BI: CPT | Performed by: RADIOLOGY

## 2024-05-28 PROCEDURE — 77067 SCR MAMMO BI INCL CAD: CPT | Performed by: RADIOLOGY

## 2024-07-18 ENCOUNTER — OFFICE VISIT (OUTPATIENT)
Dept: OBSTETRICS AND GYNECOLOGY | Age: 74
End: 2024-07-18
Payer: MEDICARE

## 2024-07-18 ENCOUNTER — HOSPITAL ENCOUNTER (OUTPATIENT)
Facility: HOSPITAL | Age: 74
Discharge: HOME | End: 2024-07-18
Admitting: OBSTETRICS & GYNECOLOGY
Payer: MEDICARE

## 2024-07-18 VITALS
BODY MASS INDEX: 24.66 KG/M2 | HEIGHT: 65 IN | DIASTOLIC BLOOD PRESSURE: 64 MMHG | WEIGHT: 148 LBS | SYSTOLIC BLOOD PRESSURE: 142 MMHG

## 2024-07-18 DIAGNOSIS — Z78.0 POST-MENOPAUSAL: ICD-10-CM

## 2024-07-18 DIAGNOSIS — Z01.419 ENCOUNTER FOR GYNECOLOGICAL EXAMINATION WITHOUT ABNORMAL FINDING: Primary | ICD-10-CM

## 2024-07-18 PROCEDURE — 77080 DXA BONE DENSITY AXIAL: CPT

## 2024-07-18 RX ORDER — TERBINAFINE HYDROCHLORIDE 250 MG/1
TABLET ORAL
COMMUNITY
Start: 2024-06-17
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
## 2024-07-22 RX ORDER — ALENDRONATE SODIUM 70 MG/1
70 TABLET ORAL
Qty: 12 TABLET | Refills: 3 | Status: SHIPPED | OUTPATIENT
Start: 2024-07-22
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
## 2025-04-29 RX ORDER — METOPROLOL SUCCINATE 25 MG/1
12.5 TABLET, EXTENDED RELEASE ORAL NIGHTLY
Qty: 45 TABLET | Refills: 3 | Status: SHIPPED | OUTPATIENT
Start: 2025-04-29

## 2025-05-08 RX ORDER — ALENDRONATE SODIUM 70 MG/1
70 TABLET ORAL
Qty: 12 TABLET | Refills: 3 | Status: SHIPPED | OUTPATIENT
Start: 2025-05-08

## 2025-05-12 RX ORDER — BETAMETHASONE VALERATE 1.2 MG/G
CREAM TOPICAL
Qty: 30 G | Refills: 3 | Status: SHIPPED | OUTPATIENT
Start: 2025-05-12

## 2025-06-03 ENCOUNTER — APPOINTMENT (OUTPATIENT)
Dept: WOMENS IMAGING | Facility: HOSPITAL | Age: 75
End: 2025-06-03
Payer: MEDICARE

## 2025-06-03 PROCEDURE — 77067 SCR MAMMO BI INCL CAD: CPT | Performed by: RADIOLOGY

## 2025-06-03 PROCEDURE — 77063 BREAST TOMOSYNTHESIS BI: CPT | Performed by: RADIOLOGY

## 2025-06-09 DIAGNOSIS — N64.89 BREAST ASYMMETRY: Primary | ICD-10-CM

## 2025-06-13 ENCOUNTER — APPOINTMENT (OUTPATIENT)
Dept: WOMENS IMAGING | Facility: HOSPITAL | Age: 75
End: 2025-06-13
Payer: MEDICARE

## 2025-06-13 PROCEDURE — G0279 TOMOSYNTHESIS, MAMMO: HCPCS | Performed by: RADIOLOGY

## 2025-06-13 PROCEDURE — 77065 DX MAMMO INCL CAD UNI: CPT | Performed by: RADIOLOGY

## 2025-06-13 PROCEDURE — 76642 ULTRASOUND BREAST LIMITED: CPT | Performed by: RADIOLOGY

## 2025-06-16 DIAGNOSIS — N63.10 MASS OF RIGHT BREAST, UNSPECIFIED QUADRANT: Primary | ICD-10-CM

## 2025-06-16 DIAGNOSIS — N63.15 UNSPECIFIED LUMP IN THE RIGHT BREAST, OVERLAPPING QUADRANTS: ICD-10-CM

## 2025-06-18 ENCOUNTER — HOSPITAL ENCOUNTER (OUTPATIENT)
Dept: CARDIOLOGY | Facility: HOSPITAL | Age: 75
Discharge: HOME | End: 2025-06-18
Admitting: INTERNAL MEDICINE
Payer: MEDICARE

## 2025-06-18 ENCOUNTER — OFFICE VISIT (OUTPATIENT)
Dept: CARDIOLOGY | Age: 75
End: 2025-06-18
Payer: MEDICARE

## 2025-06-18 ENCOUNTER — TELEPHONE (OUTPATIENT)
Dept: CARDIOLOGY | Age: 75
End: 2025-06-18

## 2025-06-18 VITALS
HEIGHT: 65 IN | SYSTOLIC BLOOD PRESSURE: 163 MMHG | WEIGHT: 148 LBS | DIASTOLIC BLOOD PRESSURE: 89 MMHG | HEART RATE: 55 BPM | BODY MASS INDEX: 24.66 KG/M2

## 2025-06-18 VITALS
WEIGHT: 150.2 LBS | OXYGEN SATURATION: 95 % | HEIGHT: 65 IN | BODY MASS INDEX: 25.02 KG/M2 | HEART RATE: 55 BPM | DIASTOLIC BLOOD PRESSURE: 72 MMHG | SYSTOLIC BLOOD PRESSURE: 110 MMHG

## 2025-06-18 DIAGNOSIS — C50.411 MALIGNANT NEOPLASM OF UPPER-OUTER QUADRANT OF RIGHT BREAST IN FEMALE, ESTROGEN RECEPTOR NEGATIVE: ICD-10-CM

## 2025-06-18 DIAGNOSIS — Z17.1 MALIGNANT NEOPLASM OF UPPER-OUTER QUADRANT OF RIGHT BREAST IN FEMALE, ESTROGEN RECEPTOR NEGATIVE: ICD-10-CM

## 2025-06-18 DIAGNOSIS — E78.1 HYPERTRIGLYCERIDEMIA: ICD-10-CM

## 2025-06-18 DIAGNOSIS — Z17.1 MALIGNANT NEOPLASM OF UPPER-INNER QUADRANT OF LEFT BREAST IN FEMALE, ESTROGEN RECEPTOR NEGATIVE: ICD-10-CM

## 2025-06-18 DIAGNOSIS — I25.10 NONOCCLUSIVE CORONARY ATHEROSCLEROSIS OF NATIVE CORONARY ARTERY: ICD-10-CM

## 2025-06-18 DIAGNOSIS — R00.1 SINUS BRADYCARDIA: ICD-10-CM

## 2025-06-18 DIAGNOSIS — C50.212 MALIGNANT NEOPLASM OF UPPER-INNER QUADRANT OF LEFT BREAST IN FEMALE, ESTROGEN RECEPTOR NEGATIVE: ICD-10-CM

## 2025-06-18 DIAGNOSIS — I49.3 PVCS (PREMATURE VENTRICULAR CONTRACTIONS): Primary | ICD-10-CM

## 2025-06-18 DIAGNOSIS — I47.29 NSVT (NONSUSTAINED VENTRICULAR TACHYCARDIA): ICD-10-CM

## 2025-06-18 DIAGNOSIS — Z85.3 HX OF BREAST CANCER: ICD-10-CM

## 2025-06-18 DIAGNOSIS — E78.2 MIXED HYPERLIPIDEMIA: ICD-10-CM

## 2025-06-18 DIAGNOSIS — I49.3 PVCS (PREMATURE VENTRICULAR CONTRACTIONS): ICD-10-CM

## 2025-06-18 LAB
AORTIC DIMENSIONLESS INDEX: 0.69 (DI)
ASCENDING AORTA: 2.8 CM
AV MEAN PRESS GRAD SYS DOP V1V2: 4 MMHG
AV VMAX SYS DOP: 140 CM/SEC
BH CV ECHO LEFT VENTRICLE GLOBAL LONGITUDINAL STRAIN: -22 %
BH CV ECHO MEAS - ACS: 1.68 CM
BH CV ECHO MEAS - AO MAX PG: 7.8 MMHG
BH CV ECHO MEAS - AO ROOT AREA (BSA CORRECTED): 1.5 CM2
BH CV ECHO MEAS - AO ROOT DIAM: 2.6 CM
BH CV ECHO MEAS - AO V2 VTI: 34.9 CM
BH CV ECHO MEAS - AVA(I,D): 2.07 CM2
BH CV ECHO MEAS - EDV(CUBED): 97.3 ML
BH CV ECHO MEAS - EDV(MOD-SP2): 82 ML
BH CV ECHO MEAS - EDV(MOD-SP4): 75 ML
BH CV ECHO MEAS - EF(MOD-SP2): 64.6 %
BH CV ECHO MEAS - EF(MOD-SP4): 68 %
BH CV ECHO MEAS - ESV(CUBED): 22.3 ML
BH CV ECHO MEAS - ESV(MOD-SP2): 29 ML
BH CV ECHO MEAS - ESV(MOD-SP4): 24 ML
BH CV ECHO MEAS - FS: 38.8 %
BH CV ECHO MEAS - IVS/LVPW: 1 CM
BH CV ECHO MEAS - IVSD: 0.9 CM
BH CV ECHO MEAS - LAT PEAK E' VEL: 11.3 CM/SEC
BH CV ECHO MEAS - LV DIASTOLIC VOL/BSA (35-75): 43.1 CM2
BH CV ECHO MEAS - LV MASS(C)D: 137.7 GRAMS
BH CV ECHO MEAS - LV MAX PG: 3.8 MMHG
BH CV ECHO MEAS - LV MEAN PG: 1.65 MMHG
BH CV ECHO MEAS - LV SYSTOLIC VOL/BSA (12-30): 13.8 CM2
BH CV ECHO MEAS - LV V1 MAX: 97.1 CM/SEC
BH CV ECHO MEAS - LV V1 VTI: 23.9 CM
BH CV ECHO MEAS - LVIDD: 4.6 CM
BH CV ECHO MEAS - LVIDS: 2.8 CM
BH CV ECHO MEAS - LVOT AREA: 3 CM2
BH CV ECHO MEAS - LVOT DIAM: 1.96 CM
BH CV ECHO MEAS - LVPWD: 0.9 CM
BH CV ECHO MEAS - MED PEAK E' VEL: 8.4 CM/SEC
BH CV ECHO MEAS - MR MAX PG: 57.7 MMHG
BH CV ECHO MEAS - MR MAX VEL: 379.7 CM/SEC
BH CV ECHO MEAS - MV A DUR: 0.14 SEC
BH CV ECHO MEAS - MV A MAX VEL: 91.2 CM/SEC
BH CV ECHO MEAS - MV DEC SLOPE: 221.3 CM/SEC2
BH CV ECHO MEAS - MV DEC TIME: 0.28 SEC
BH CV ECHO MEAS - MV E MAX VEL: 68.7 CM/SEC
BH CV ECHO MEAS - MV E/A: 0.75
BH CV ECHO MEAS - MV MAX PG: 3.4 MMHG
BH CV ECHO MEAS - MV MEAN PG: 1.05 MMHG
BH CV ECHO MEAS - MV P1/2T: 92.6 MSEC
BH CV ECHO MEAS - MV V2 VTI: 31.2 CM
BH CV ECHO MEAS - MVA(P1/2T): 2.38 CM2
BH CV ECHO MEAS - MVA(VTI): 2.31 CM2
BH CV ECHO MEAS - PA V2 MAX: 84.8 CM/SEC
BH CV ECHO MEAS - PULM A REVS DUR: 0.14 SEC
BH CV ECHO MEAS - PULM A REVS VEL: 23.7 CM/SEC
BH CV ECHO MEAS - PULM DIAS VEL: 18.4 CM/SEC
BH CV ECHO MEAS - PULM S/D: 1.12
BH CV ECHO MEAS - PULM SYS VEL: 20.6 CM/SEC
BH CV ECHO MEAS - QP/QS: 0.53
BH CV ECHO MEAS - RAP SYSTOLE: 3 MMHG
BH CV ECHO MEAS - RV MAX PG: 1.1 MMHG
BH CV ECHO MEAS - RV V1 MAX: 52.4 CM/SEC
BH CV ECHO MEAS - RV V1 VTI: 12.7 CM
BH CV ECHO MEAS - RVOT DIAM: 1.97 CM
BH CV ECHO MEAS - SV(LVOT): 72.2 ML
BH CV ECHO MEAS - SV(MOD-SP2): 53 ML
BH CV ECHO MEAS - SV(MOD-SP4): 51 ML
BH CV ECHO MEAS - SV(RVOT): 38.5 ML
BH CV ECHO MEAS - SVI(LVOT): 41.5 ML/M2
BH CV ECHO MEAS - SVI(MOD-SP2): 30.5 ML/M2
BH CV ECHO MEAS - SVI(MOD-SP4): 29.3 ML/M2
BH CV ECHO MEAS - TAPSE (>1.6): 2.2 CM
BH CV ECHO MEASUREMENTS AVERAGE E/E' RATIO: 6.97
BH CV XLRA - RV BASE: 2.8 CM
BH CV XLRA - RV LENGTH: 5.7 CM
BH CV XLRA - RV MID: 1.81 CM
BH CV XLRA - TDI S': 13.7 CM/SEC
LEFT ATRIUM VOLUME INDEX: 26.6 ML/M2
LV EF 3D SEGMENTATION: 65 %
LV EF BIPLANE MOD: 66.5 %
SINUS: 2.6 CM
STJ: 2.4 CM

## 2025-06-18 PROCEDURE — 25510000001 PERFLUTREN 6.52 MG/ML SUSPENSION 2 ML VIAL: Performed by: INTERNAL MEDICINE

## 2025-06-18 PROCEDURE — 93306 TTE W/DOPPLER COMPLETE: CPT

## 2025-06-18 PROCEDURE — 93356 MYOCRD STRAIN IMG SPCKL TRCK: CPT

## 2025-06-18 RX ORDER — MIRABEGRON 50 MG/1
50 TABLET, FILM COATED, EXTENDED RELEASE ORAL
COMMUNITY
Start: 2025-03-28

## 2025-06-18 RX ORDER — ATORVASTATIN CALCIUM 40 MG/1
TABLET, FILM COATED ORAL
COMMUNITY

## 2025-06-18 RX ORDER — CYANOCOBALAMIN (VITAMIN B-12) 500 MCG
TABLET ORAL
COMMUNITY

## 2025-06-18 RX ADMIN — PERFLUTREN 1 ML: 6.52 INJECTION, SUSPENSION INTRAVENOUS at 11:30
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
## 2025-06-20 ENCOUNTER — APPOINTMENT (OUTPATIENT)
Dept: WOMENS IMAGING | Facility: HOSPITAL | Age: 75
End: 2025-06-20
Payer: MEDICARE

## 2025-06-20 ENCOUNTER — LAB REQUISITION (OUTPATIENT)
Dept: LAB | Facility: HOSPITAL | Age: 75
End: 2025-06-20
Payer: MEDICARE

## 2025-06-20 DIAGNOSIS — N63.0 UNSPECIFIED LUMP IN UNSPECIFIED BREAST: ICD-10-CM

## 2025-06-20 PROCEDURE — A4648 IMPLANTABLE TISSUE MARKER: HCPCS | Performed by: RADIOLOGY

## 2025-06-20 PROCEDURE — 88305 TISSUE EXAM BY PATHOLOGIST: CPT | Performed by: OBSTETRICS & GYNECOLOGY

## 2025-06-20 PROCEDURE — 88342 IMHCHEM/IMCYTCHM 1ST ANTB: CPT | Performed by: OBSTETRICS & GYNECOLOGY

## 2025-06-20 PROCEDURE — 88360 TUMOR IMMUNOHISTOCHEM/MANUAL: CPT | Performed by: OBSTETRICS & GYNECOLOGY

## 2025-06-20 PROCEDURE — 88341 IMHCHEM/IMCYTCHM EA ADD ANTB: CPT | Performed by: OBSTETRICS & GYNECOLOGY

## 2025-06-20 PROCEDURE — 19083 BX BREAST 1ST LESION US IMAG: CPT | Performed by: RADIOLOGY

## 2025-06-25 LAB
CYTO UR: NORMAL
DX PRELIMINARY: NORMAL
LAB AP CASE REPORT: NORMAL
LAB AP CLINICAL INFORMATION: NORMAL
LAB AP INTRADEPARTMENTAL CONSULT: NORMAL
LAB AP SPECIAL STAINS: NORMAL
LAB AP SYNOPTIC CHECKLIST: NORMAL
PATH REPORT.FINAL DX SPEC: NORMAL
PATH REPORT.GROSS SPEC: NORMAL

## 2025-06-26 ENCOUNTER — PATIENT OUTREACH (OUTPATIENT)
Dept: OTHER | Facility: HOSPITAL | Age: 75
End: 2025-06-26
Payer: MEDICARE

## 2025-06-26 DIAGNOSIS — C50.919 MALIGNANT NEOPLASM OF FEMALE BREAST, UNSPECIFIED ESTROGEN RECEPTOR STATUS, UNSPECIFIED LATERALITY, UNSPECIFIED SITE OF BREAST: Primary | ICD-10-CM

## 2025-07-01 ENCOUNTER — OFFICE VISIT (OUTPATIENT)
Dept: SURGERY | Facility: CLINIC | Age: 75
End: 2025-07-01
Payer: MEDICARE

## 2025-07-01 VITALS
BODY MASS INDEX: 24.96 KG/M2 | DIASTOLIC BLOOD PRESSURE: 78 MMHG | OXYGEN SATURATION: 97 % | SYSTOLIC BLOOD PRESSURE: 130 MMHG | HEIGHT: 65 IN | HEART RATE: 78 BPM | WEIGHT: 149.8 LBS

## 2025-07-01 DIAGNOSIS — Z17.0 MALIGNANT NEOPLASM OF UPPER-INNER QUADRANT OF RIGHT BREAST IN FEMALE, ESTROGEN RECEPTOR POSITIVE: ICD-10-CM

## 2025-07-01 DIAGNOSIS — Z85.3 HISTORY OF LEFT BREAST CANCER: Primary | ICD-10-CM

## 2025-07-01 DIAGNOSIS — C50.211 MALIGNANT NEOPLASM OF UPPER-INNER QUADRANT OF RIGHT BREAST IN FEMALE, ESTROGEN RECEPTOR POSITIVE: ICD-10-CM

## 2025-07-01 NOTE — PROGRESS NOTES
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 seroma, skin complications including necrosis, breast asymmetry, need for re-excision, lymphedema, inability to breast feed in the future, possible need for axillary dissection at time of surgery or at a later date, lymphocele, and scar.  Patient appears to understand and wishes to proceed.  All questions were answered.  Discussed due to her previous history of radiation she has increased risk of wound healing, should there be a close margin would need to rediscuss role of mastectomy.  -Will discuss at tumor board      # Medical Oncology:  -Will refer postoperatively.  - Is an Oncotype candidate    #  Radiation Oncology:  -Will refer postoperatively indicated    # Nurse Navigation  - Referral made today    # Lymphedema clinic  - Referral will be placed postop pending surgical plans, no axillary intervention planned at this time if she has symptoms or flare due to surgical intervention in her breast will refer at that time    # Genetic Testing   - 86 multicancer gene panel negative - sent in 2020    # Breast Imaging  - Next Screening mammogram due: 6/4/2026 at [facility]      Janet Mcclure MD  Breast Surgical Oncology    I spent 90 minutes caring for Yuli  on this date of  service. This time includes time spent by me in the following activities: preparing for the visit, reviewing tests, obtaining and/or reviewing a separately obtained history, performing a medically appropriate examination and/or evaluation , counseling and educating the patient/family/caregiver, ordering medications, tests, or procedures, referring and communicating with other health care professionals , documenting information in the medical record, independently interpreting results and communicating that information with the patient/family/caregiver, and care coordination.     CC:  No ref. provider found  Pardeep Stewart MD Shannon Thomas    Return for surgery.

## 2025-07-01 NOTE — LETTER
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 case. Genetic testing is warranted and was previously sent and negative for a mutation          Plan:  A multidisciplinary plan has been formulated for the patient:      # Breast Surgical Oncology:  - she is adamently against another mastectomy  -Consents completed and signed. Discussed the risks and benefits of right partial mastectomy laurel guided at length including estimated time for procedure, postoperative recovery, and postoperative instructions. Discussed the risks to include pain, bleeding, infection, nerve injury, numbness, seroma, skin complications including necrosis, breast asymmetry, need for re-excision, lymphedema, inability to breast feed in the future, possible need for axillary dissection at time of surgery or at a later date, lymphocele, and scar.  Patient appears to understand and wishes to proceed.  All questions were answered.  Discussed due to her previous history of radiation she has increased risk of wound healing, should there be a close margin would need to rediscuss role of mastectomy.  -Will discuss at tumor board      # Medical Oncology:  -Will refer postoperatively.  - Is an Oncotype candidate    #   Radiation Oncology:  -Will refer postoperatively indicated    # Nurse Navigation  - Referral made today    # Lymphedema clinic  - Referral will be placed postop pending surgical plans, no axillary intervention planned at this time if she has symptoms or flare due to surgical intervention in her breast will refer at that time    # Genetic Testing   - 84 multicancer gene panel negative - sent in 2020    # Breast Imaging  - Next Screening mammogram due: 6/4/2026 at [facility]      Janet Mcclure MD  Breast Surgical Oncology    I spent 90 minutes caring for Yuli  on this date of service. This time includes time spent by me in the following activities: preparing for the visit, reviewing tests, obtaining and/or reviewing a separately obtained history, performing a medically appropriate examination and/or evaluation , counseling and educating the patient/family/caregiver, ordering medications, tests, or procedures, referring and communicating with other health care professionals , documenting information in the medical record, independently interpreting results and communicating that information with the patient/family/caregiver, and care coordination.     CC:  No ref. provider found  Pardeep Stewart MD Shannon Thomas    Return for surgery.

## 2025-07-02 ENCOUNTER — PATIENT OUTREACH (OUTPATIENT)
Dept: OTHER | Facility: HOSPITAL | Age: 75
End: 2025-07-02
Payer: MEDICARE

## 2025-07-02 ENCOUNTER — TELEPHONE (OUTPATIENT)
Dept: OBSTETRICS AND GYNECOLOGY | Age: 75
End: 2025-07-02
Payer: MEDICARE

## 2025-07-02 DIAGNOSIS — Z78.0 POST-MENOPAUSAL: Primary | ICD-10-CM

## 2025-07-02 PROBLEM — Z85.3 HISTORY OF LEFT BREAST CANCER: Status: ACTIVE | Noted: 2025-07-02

## 2025-07-02 PROBLEM — Z17.0 MALIGNANT NEOPLASM OF UPPER-INNER QUADRANT OF RIGHT BREAST IN FEMALE, ESTROGEN RECEPTOR POSITIVE: Status: ACTIVE | Noted: 2025-07-02

## 2025-07-02 PROBLEM — C50.211 MALIGNANT NEOPLASM OF UPPER-INNER QUADRANT OF RIGHT BREAST IN FEMALE, ESTROGEN RECEPTOR POSITIVE: Status: ACTIVE | Noted: 2025-07-02

## 2025-07-02 NOTE — TELEPHONE ENCOUNTER
Caller: Yuli Huang    Relationship: Self    Best call back number: [phone]        Who are you requesting to speak with (clinical staff, DR. PELLETIER        What was the call regarding: PATIENT SCHEDULED APPT FOR ANNUAL AND DEXA, BUT DEXA APPT WAS SET TO [place] AND NOT [facility].  HUB TRIED TO R/S AND IS UNABLE TO DO SO, PLEASE ASSIST AND ADVISE PATIENT.  CAN LEAVE VM IF NEEDED.

## 2025-07-02 NOTE — PROGRESS NOTES
Referral received from Dr. Mcclure's office. Called Ms. Huang and left a message introducing myself and navigational services. Asked her to call me back at her convenience and left my contact information.

## 2025-07-03 ENCOUNTER — PATIENT OUTREACH (OUTPATIENT)
Dept: OTHER | Facility: HOSPITAL | Age: 75
End: 2025-07-03
Payer: MEDICARE

## 2025-07-03 NOTE — PROGRESS NOTES
Referral received from Dr. Mcclure's office. Called Ms. Huang after her surgery consult and introduced myself and navigational services. She has a new diagnosis of  right breast invasive ductal carcinoma grade 1, ER+, CT+, and HER2-, T1  cN0 M0  Clinical anatomic stage IA:, Clinical prognostic stage IA.      She has a good understanding of her pathology and treatment options presented to her by Dr. Mcclure and was able to verbalize teach back. After the consult she is considering her surgery options considering this is her 3rd breast cancer and she had radiation 2 times previously. Her case will be presented in tumor board to discuss options. She is comfortable with this plan and has no questions or concerns following the consult.      We discussed resource needs and she stated she has adequate housing, no food insecurity, adequate transportation to and from appointments, is not concerned with finances at this time, drinks alcohol twice a month, does not feel lonely or isolated, and has not felt depressed, anxious or hopeless in the past two weeks.     We discussed her support system and she stated she has wonderful friends who are very helpful. We discussed our supportive oncology clinic if the need arises and she was thankful for the information.     We discussed integrative therapies and other [facility]. Will mail a navigation folder with the following information:     [employer] [facility], [employer], [employer] Exercise program, Guide for the Newly Diagnosed, [employer], Cancer Support Services Brochure, Breast Cancer Program Brochure.     She verbalized appreciation for navigational services and she has my contact information and will call with any questions that arise.

## 2025-07-23 ENCOUNTER — PRE-ADMISSION TESTING (OUTPATIENT)
Dept: PREADMISSION TESTING | Facility: HOSPITAL | Age: 75
End: 2025-07-23
Payer: MEDICARE

## 2025-07-23 ENCOUNTER — HOSPITAL ENCOUNTER (OUTPATIENT)
Dept: MAMMOGRAPHY | Facility: HOSPITAL | Age: 75
Discharge: HOME | End: 2025-07-23
Payer: MEDICARE

## 2025-07-23 ENCOUNTER — HOSPITAL ENCOUNTER (OUTPATIENT)
Dept: MAMMOGRAPHY | Facility: HOSPITAL | Age: 75
End: 2025-07-23
Payer: MEDICARE

## 2025-07-23 ENCOUNTER — HOSPITAL ENCOUNTER (OUTPATIENT)
Dept: ULTRASOUND IMAGING | Facility: HOSPITAL | Age: 75
Discharge: HOME | End: 2025-07-23
Payer: MEDICARE

## 2025-07-23 VITALS
BODY MASS INDEX: 25.11 KG/M2 | RESPIRATION RATE: 18 BRPM | TEMPERATURE: 98.4 F | HEIGHT: 65 IN | WEIGHT: 150.7 LBS | HEART RATE: 65 BPM | DIASTOLIC BLOOD PRESSURE: 67 MMHG | SYSTOLIC BLOOD PRESSURE: 181 MMHG | OXYGEN SATURATION: 98 %

## 2025-07-23 DIAGNOSIS — Z17.0 MALIGNANT NEOPLASM OF UPPER-INNER QUADRANT OF RIGHT BREAST IN FEMALE, ESTROGEN RECEPTOR POSITIVE: ICD-10-CM

## 2025-07-23 DIAGNOSIS — C50.211 MALIGNANT NEOPLASM OF UPPER-INNER QUADRANT OF RIGHT BREAST IN FEMALE, ESTROGEN RECEPTOR POSITIVE: ICD-10-CM

## 2025-07-23 DIAGNOSIS — N63.10 MASS OF RIGHT BREAST, UNSPECIFIED QUADRANT: ICD-10-CM

## 2025-07-23 LAB
ANION GAP SERPL CALCULATED.3IONS-SCNC: 9 MMOL/L (ref 5–15)
BASOPHILS # BLD AUTO: 0.04 10*3/MM3 (ref 0–0.2)
BASOPHILS NFR BLD AUTO: 0.6 % (ref 0–1.5)
BUN SERPL-MCNC: 19 MG/DL (ref 8–23)
BUN/CREAT SERPL: 23.2 (ref 7–25)
CALCIUM SPEC-SCNC: 9.4 MG/DL (ref 8.6–10.5)
CHLORIDE SERPL-SCNC: 105 MMOL/L (ref 98–107)
CO2 SERPL-SCNC: 27 MMOL/L (ref 22–29)
CREAT SERPL-MCNC: 0.82 MG/DL (ref 0.57–1)
DEPRECATED RDW RBC AUTO: 44.2 FL (ref 37–54)
EGFRCR SERPLBLD CKD-EPI 2021: 75.6 ML/MIN/1.73
EOSINOPHIL # BLD AUTO: 0.05 10*3/MM3 (ref 0–0.4)
EOSINOPHIL NFR BLD AUTO: 0.7 % (ref 0.3–6.2)
ERYTHROCYTE [DISTWIDTH] IN BLOOD BY AUTOMATED COUNT: 11.6 % (ref 12.3–15.4)
GLUCOSE SERPL-MCNC: 91 MG/DL (ref 65–99)
HCT VFR BLD AUTO: 42.7 % (ref 34–46.6)
HGB BLD-MCNC: 14.4 G/DL (ref 12–15.9)
IMM GRANULOCYTES # BLD AUTO: 0.03 10*3/MM3 (ref 0–0.05)
IMM GRANULOCYTES NFR BLD AUTO: 0.4 % (ref 0–0.5)
LYMPHOCYTES # BLD AUTO: 1.64 10*3/MM3 (ref 0.7–3.1)
LYMPHOCYTES NFR BLD AUTO: 23.2 % (ref 19.6–45.3)
MCH RBC QN AUTO: 34.9 PG (ref 26.6–33)
MCHC RBC AUTO-ENTMCNC: 33.7 G/DL (ref 31.5–35.7)
MCV RBC AUTO: 103.4 FL (ref 79–97)
MONOCYTES # BLD AUTO: 0.67 10*3/MM3 (ref 0.1–0.9)
MONOCYTES NFR BLD AUTO: 9.5 % (ref 5–12)
NEUTROPHILS NFR BLD AUTO: 4.64 10*3/MM3 (ref 1.7–7)
NEUTROPHILS NFR BLD AUTO: 65.6 % (ref 42.7–76)
NRBC BLD AUTO-RTO: 0 /100 WBC (ref 0–0.2)
PLATELET # BLD AUTO: 275 10*3/MM3 (ref 140–450)
PMV BLD AUTO: 10.1 FL (ref 6–12)
POTASSIUM SERPL-SCNC: 4.7 MMOL/L (ref 3.5–5.2)
RBC # BLD AUTO: 4.13 10*6/MM3 (ref 3.77–5.28)
SODIUM SERPL-SCNC: 141 MMOL/L (ref 136–145)
WBC NRBC COR # BLD AUTO: 7.07 10*3/MM3 (ref 3.4–10.8)

## 2025-07-23 PROCEDURE — 77065 DX MAMMO INCL CAD UNI: CPT

## 2025-07-23 PROCEDURE — 25010000002 LIDOCAINE 1 % SOLUTION: Performed by: RADIOLOGY

## 2025-07-23 PROCEDURE — 85025 COMPLETE CBC W/AUTO DIFF WBC: CPT

## 2025-07-23 PROCEDURE — 36415 COLL VENOUS BLD VENIPUNCTURE: CPT

## 2025-07-23 PROCEDURE — 80048 BASIC METABOLIC PNL TOTAL CA: CPT

## 2025-07-23 PROCEDURE — C1728 CATH, BRACHYTX SEED ADM: HCPCS

## 2025-07-23 RX ORDER — GLUCOSAMINE/D3/BOSWELLIA SERRA 1500MG-400
10000 TABLET ORAL DAILY
COMMUNITY

## 2025-07-23 RX ORDER — LIDOCAINE HYDROCHLORIDE 10 MG/ML
10 INJECTION, SOLUTION INFILTRATION; PERINEURAL ONCE
Status: COMPLETED | OUTPATIENT
Start: 2025-07-23 | End: 2025-07-23

## 2025-07-23 RX ADMIN — LIDOCAINE HYDROCHLORIDE 3 ML: 10 INJECTION, SOLUTION INFILTRATION; PERINEURAL at 09:20

## 2025-07-23 NOTE — DISCHARGE INSTRUCTIONS
Take the following medications the morning of surgery: NONE      If you are on an Aspirin or a Blood Thinner please clarify with the surgeon and prescribing physician if and when you are to hold the medication or if you are to continue the medication.  If you are on prescription narcotic pain medication to control your pain you may also take that medication the morning of surgery.      General Instructions:     Do not eat solid food after midnight the night before surgery.  Clear liquids day of surgery are allowed but must be stopped at least two hours before your hospital arrival time.       Allowed clear liquids      Water, sodas, and tea or coffee with no cream or milk added.       12 to 20 ounces of a clear liquid that contains carbohydrates is recommended.  If non-diabetic, have Gatorade or Powerade.  If diabetic, have G2 or Powerade Zero.     Do not have liquids red in color.  Do not consume chicken, beef, pork or vegetable broth or bouillon cubes of any variety as they are not considered clear liquids and are not allowed.      Infants may have breast milk up to four hours before surgery.  Infants drinking formula may drink formula up to six hours before surgery.   Patients who avoid smoking, chewing tobacco and alcohol for 4 weeks prior to surgery have a reduced risk of post-operative complications.  Quit smoking as many days before surgery as you can.  Do not smoke, use chewing tobacco or drink alcohol the day of surgery.   If applicable bring your C-PAP/ BI-PAP machine in with you to preop day of surgery.  Bring any papers given to you in the doctor’s office.  Wear clean comfortable clothes.  Do not wear contact lenses, false eyelashes or make-up.  Bring a case for your glasses.   Bring crutches or walker if applicable.  Remove all piercings.  Leave jewelry and any other valuables at home.  Hair extensions with metal clips must be removed prior to surgery.  The Pre-Admission Testing nurse will instruct you  to bring medications if unable to obtain an accurate list in Pre-Admission Testing.    Day of surgery you will need to let the preoperative nurse know the last time you took each of your medications.  To ensure a safe environment for patients and staff, we kindly ask that children under the age of 16 not accompany patients.  If you must bring a dependent child or dependent adult please ensure a responsible adult, other than yourself, is present to supervise them.      If you were given a blood bank ID arm band remember to bring it with you the day of surgery.    Preventing a Surgical Site Infection:  For 2 to 3 days before surgery, avoid shaving with a razor because the razor can irritate skin and make it easier to develop an infection.    Any areas of open skin can increase the risk of a post-operative wound infection by allowing bacteria to enter and travel throughout the body.  Notify your surgeon if you have any skin wounds / rashes even if it is not near the expected surgical site.  The area will need assessed to determine if surgery should be delayed until it is healed.  The night prior to surgery shower using a fresh bar of anti-bacterial soap (such as Dial) and clean washcloth.  Sleep in a clean bed with clean clothing.  Do not allow pets to sleep with you.  Shower on the morning of surgery using a fresh bar of anti-bacterial soap (such as Dial) and clean washcloth.  Dry with a clean towel and dress in clean clothing.  Ask your surgeon if you will be receiving antibiotics prior to surgery.  Make sure you, your family, and all healthcare providers clean their hands with soap and water or an alcohol based hand  before caring for you or your wound.    Day of surgery:  Your arrival time is approximately two hours before your scheduled surgery time.  Please note if you have an early arrival time the surgery doors do not open before 5:00 AM.  Upon arrival, a Pre-op nurse and Anesthesiologist will review  your health history, obtain vital signs, and answer questions you may have.  The only belongings needed at this time will be a list of your home medications and if applicable your C-PAP/BI-PAP machine.  A Pre-op nurse will start an IV and you may receive medication in preparation for surgery, including something to help you relax.     Please be aware that surgery does come with discomfort.  We want to make every effort to control your discomfort so please discuss any uncontrolled symptoms with your nurse.   Your doctor will most likely have prescribed pain medications.      If you are going home after surgery you will receive individualized written care instructions before being discharged.  A responsible adult must drive you to and from the hospital on the day of your surgery and ideally stay with you through the night.   .  Discharge prescriptions can be filled by the hospital pharmacy during regular pharmacy hours.  If you are having surgery late in the day/evening your prescription may be e-prescribed to your pharmacy.  Please verify your pharmacy hours or chose a 24 hour pharmacy to avoid not having access to your prescription because your pharmacy has closed for the day.    If you are staying overnight following surgery, you will be transported to your hospital room following the recovery period.  [facility] has all private rooms.    If you have any questions please call Pre-Admission Testing at [phone].  Deductibles and co-payments are collected on the day of service. Please be prepared to pay the required co-pay, deductible or deposit on the day of service as defined by your plan.    Call your surgeon immediately if you experience any of the following symptoms:  Sore Throat  Shortness of Breath or difficulty breathing  Cough  Chills  Body soreness or muscle pain  Headache  Fever  New loss of taste or smell  Do not arrive for your surgery ill.  Your procedure will need to be rescheduled  to another time.  You will need to call your physician before the day of surgery to avoid any unnecessary exposure to hospital staff as well as other patients.

## 2025-08-01 ENCOUNTER — HOSPITAL ENCOUNTER (OUTPATIENT)
Facility: HOSPITAL | Age: 75
Setting detail: HOSPITAL OUTPATIENT SURGERY
Discharge: HOME | End: 2025-08-01
Attending: STUDENT IN AN ORGANIZED HEALTH CARE EDUCATION/TRAINING PROGRAM | Admitting: STUDENT IN AN ORGANIZED HEALTH CARE EDUCATION/TRAINING PROGRAM
Payer: MEDICARE

## 2025-08-01 ENCOUNTER — TRANSCRIBE ORDERS (OUTPATIENT)
Dept: LAB | Facility: HOSPITAL | Age: 75
End: 2025-08-01
Payer: MEDICARE

## 2025-08-01 ENCOUNTER — ANESTHESIA (OUTPATIENT)
Dept: PERIOP | Facility: HOSPITAL | Age: 75
End: 2025-08-01
Payer: MEDICARE

## 2025-08-01 ENCOUNTER — ANCILLARY PROCEDURE (OUTPATIENT)
Dept: LAB | Facility: HOSPITAL | Age: 75
End: 2025-08-01
Payer: MEDICARE

## 2025-08-01 ENCOUNTER — ANESTHESIA EVENT (OUTPATIENT)
Dept: PERIOP | Facility: HOSPITAL | Age: 75
End: 2025-08-01
Payer: MEDICARE

## 2025-08-01 ENCOUNTER — APPOINTMENT (OUTPATIENT)
Dept: GENERAL RADIOLOGY | Facility: HOSPITAL | Age: 75
End: 2025-08-01
Payer: MEDICARE

## 2025-08-01 VITALS
SYSTOLIC BLOOD PRESSURE: 130 MMHG | OXYGEN SATURATION: 100 % | RESPIRATION RATE: 18 BRPM | TEMPERATURE: 97.6 F | DIASTOLIC BLOOD PRESSURE: 70 MMHG | HEART RATE: 62 BPM

## 2025-08-01 DIAGNOSIS — C50.211 MALIGNANT NEOPLASM OF UPPER-INNER QUADRANT OF RIGHT BREAST IN FEMALE, ESTROGEN RECEPTOR POSITIVE: ICD-10-CM

## 2025-08-01 DIAGNOSIS — Z17.0 MALIGNANT NEOPLASM OF UPPER-INNER QUADRANT OF RIGHT BREAST IN FEMALE, ESTROGEN RECEPTOR POSITIVE: ICD-10-CM

## 2025-08-01 DIAGNOSIS — C50.911 MALIGNANT NEOPLASM OF RIGHT FEMALE BREAST, UNSPECIFIED ESTROGEN RECEPTOR STATUS, UNSPECIFIED SITE OF BREAST: Primary | ICD-10-CM

## 2025-08-01 DIAGNOSIS — C50.911 MALIGNANT NEOPLASM OF RIGHT FEMALE BREAST, UNSPECIFIED ESTROGEN RECEPTOR STATUS, UNSPECIFIED SITE OF BREAST: ICD-10-CM

## 2025-08-01 PROCEDURE — 25010000002 PROPOFOL 500 MG/50ML EMULSION

## 2025-08-01 PROCEDURE — 25010000002 BUPIVACAINE (PF) 0.5 % SOLUTION 10 ML VIAL: Performed by: STUDENT IN AN ORGANIZED HEALTH CARE EDUCATION/TRAINING PROGRAM

## 2025-08-01 PROCEDURE — 25810000003 LACTATED RINGERS PER 1000 ML: Performed by: ANESTHESIOLOGY

## 2025-08-01 PROCEDURE — 25810000003 LACTATED RINGERS PER 1000 ML

## 2025-08-01 PROCEDURE — 25010000002 CEFAZOLIN PER 500 MG: Performed by: STUDENT IN AN ORGANIZED HEALTH CARE EDUCATION/TRAINING PROGRAM

## 2025-08-01 PROCEDURE — 25010000002 PHENYLEPHRINE 10 MG/ML SOLUTION

## 2025-08-01 PROCEDURE — 25010000002 FENTANYL CITRATE (PF) 50 MCG/ML SOLUTION

## 2025-08-01 PROCEDURE — 76098 X-RAY EXAM SURGICAL SPECIMEN: CPT

## 2025-08-01 PROCEDURE — S0260 H&P FOR SURGERY: HCPCS | Performed by: STUDENT IN AN ORGANIZED HEALTH CARE EDUCATION/TRAINING PROGRAM

## 2025-08-01 PROCEDURE — 25010000002 MIDAZOLAM PER 1 MG: Performed by: ANESTHESIOLOGY

## 2025-08-01 PROCEDURE — 25010000002 FAMOTIDINE 10 MG/ML SOLUTION: Performed by: ANESTHESIOLOGY

## 2025-08-01 PROCEDURE — 19301 PARTIAL MASTECTOMY: CPT | Performed by: STUDENT IN AN ORGANIZED HEALTH CARE EDUCATION/TRAINING PROGRAM

## 2025-08-01 PROCEDURE — 88307 TISSUE EXAM BY PATHOLOGIST: CPT | Performed by: STUDENT IN AN ORGANIZED HEALTH CARE EDUCATION/TRAINING PROGRAM

## 2025-08-01 PROCEDURE — 25010000002 LIDOCAINE 1% - EPINEPHRINE 1:100000 1 %-1:100000 SOLUTION 30 ML VIAL: Performed by: STUDENT IN AN ORGANIZED HEALTH CARE EDUCATION/TRAINING PROGRAM

## 2025-08-01 DEVICE — LIGACLIP MCA MULTIPLE CLIP APPLIERS, 20 MEDIUM CLIPS
Type: IMPLANTABLE DEVICE | Site: BREAST | Status: FUNCTIONAL
Brand: LIGACLIP

## 2025-08-01 RX ORDER — FENTANYL CITRATE 50 UG/ML
50 INJECTION, SOLUTION INTRAMUSCULAR; INTRAVENOUS ONCE AS NEEDED
Status: DISCONTINUED | OUTPATIENT
Start: 2025-08-01 | End: 2025-08-01 | Stop reason: HOSPADM

## 2025-08-01 RX ORDER — FLUMAZENIL 0.1 MG/ML
0.2 INJECTION INTRAVENOUS AS NEEDED
Status: DISCONTINUED | OUTPATIENT
Start: 2025-08-01 | End: 2025-08-01 | Stop reason: HOSPADM

## 2025-08-01 RX ORDER — MIDAZOLAM HYDROCHLORIDE 1 MG/ML
0.5 INJECTION, SOLUTION INTRAMUSCULAR; INTRAVENOUS
Status: DISCONTINUED | OUTPATIENT
Start: 2025-08-01 | End: 2025-08-01 | Stop reason: HOSPADM

## 2025-08-01 RX ORDER — SODIUM CHLORIDE, SODIUM LACTATE, POTASSIUM CHLORIDE, CALCIUM CHLORIDE 600; 310; 30; 20 MG/100ML; MG/100ML; MG/100ML; MG/100ML
9 INJECTION, SOLUTION INTRAVENOUS CONTINUOUS
Status: DISCONTINUED | OUTPATIENT
Start: 2025-08-01 | End: 2025-08-01 | Stop reason: HOSPADM

## 2025-08-01 RX ORDER — NALOXONE HCL 0.4 MG/ML
0.2 VIAL (ML) INJECTION AS NEEDED
Status: DISCONTINUED | OUTPATIENT
Start: 2025-08-01 | End: 2025-08-01 | Stop reason: HOSPADM

## 2025-08-01 RX ORDER — DROPERIDOL 2.5 MG/ML
0.62 INJECTION, SOLUTION INTRAMUSCULAR; INTRAVENOUS
Status: DISCONTINUED | OUTPATIENT
Start: 2025-08-01 | End: 2025-08-01 | Stop reason: HOSPADM

## 2025-08-01 RX ORDER — HYDROCODONE BITARTRATE AND ACETAMINOPHEN 7.5; 325 MG/1; MG/1
1 TABLET ORAL EVERY 4 HOURS PRN
Status: DISCONTINUED | OUTPATIENT
Start: 2025-08-01 | End: 2025-08-01 | Stop reason: HOSPADM

## 2025-08-01 RX ORDER — PROMETHAZINE HYDROCHLORIDE 25 MG/1
25 TABLET ORAL ONCE AS NEEDED
Status: DISCONTINUED | OUTPATIENT
Start: 2025-08-01 | End: 2025-08-01 | Stop reason: HOSPADM

## 2025-08-01 RX ORDER — PHENYLEPHRINE HYDROCHLORIDE 10 MG/ML
INJECTION INTRAVENOUS AS NEEDED
Status: DISCONTINUED | OUTPATIENT
Start: 2025-08-01 | End: 2025-08-01 | Stop reason: SURG

## 2025-08-01 RX ORDER — FENTANYL CITRATE 50 UG/ML
25 INJECTION, SOLUTION INTRAMUSCULAR; INTRAVENOUS
Status: DISCONTINUED | OUTPATIENT
Start: 2025-08-01 | End: 2025-08-01 | Stop reason: HOSPADM

## 2025-08-01 RX ORDER — HYDRALAZINE HYDROCHLORIDE 20 MG/ML
5 INJECTION INTRAMUSCULAR; INTRAVENOUS
Status: DISCONTINUED | OUTPATIENT
Start: 2025-08-01 | End: 2025-08-01 | Stop reason: HOSPADM

## 2025-08-01 RX ORDER — HYDROMORPHONE HYDROCHLORIDE 1 MG/ML
0.25 INJECTION, SOLUTION INTRAMUSCULAR; INTRAVENOUS; SUBCUTANEOUS
Status: DISCONTINUED | OUTPATIENT
Start: 2025-08-01 | End: 2025-08-01 | Stop reason: HOSPADM

## 2025-08-01 RX ORDER — PROPOFOL 10 MG/ML
INJECTION, EMULSION INTRAVENOUS CONTINUOUS PRN
Status: DISCONTINUED | OUTPATIENT
Start: 2025-08-01 | End: 2025-08-01 | Stop reason: SURG

## 2025-08-01 RX ORDER — FENTANYL CITRATE 50 UG/ML
INJECTION, SOLUTION INTRAMUSCULAR; INTRAVENOUS AS NEEDED
Status: DISCONTINUED | OUTPATIENT
Start: 2025-08-01 | End: 2025-08-01 | Stop reason: SURG

## 2025-08-01 RX ORDER — IPRATROPIUM BROMIDE AND ALBUTEROL SULFATE 2.5; .5 MG/3ML; MG/3ML
3 SOLUTION RESPIRATORY (INHALATION) ONCE AS NEEDED
Status: DISCONTINUED | OUTPATIENT
Start: 2025-08-01 | End: 2025-08-01 | Stop reason: HOSPADM

## 2025-08-01 RX ORDER — SODIUM CHLORIDE, SODIUM LACTATE, POTASSIUM CHLORIDE, CALCIUM CHLORIDE 600; 310; 30; 20 MG/100ML; MG/100ML; MG/100ML; MG/100ML
INJECTION, SOLUTION INTRAVENOUS CONTINUOUS PRN
Status: DISCONTINUED | OUTPATIENT
Start: 2025-08-01 | End: 2025-08-01 | Stop reason: SURG

## 2025-08-01 RX ORDER — EPHEDRINE SULFATE 50 MG/ML
5 INJECTION, SOLUTION INTRAVENOUS ONCE AS NEEDED
Status: DISCONTINUED | OUTPATIENT
Start: 2025-08-01 | End: 2025-08-01 | Stop reason: HOSPADM

## 2025-08-01 RX ORDER — DIPHENHYDRAMINE HYDROCHLORIDE 50 MG/ML
12.5 INJECTION, SOLUTION INTRAMUSCULAR; INTRAVENOUS
Status: DISCONTINUED | OUTPATIENT
Start: 2025-08-01 | End: 2025-08-01 | Stop reason: HOSPADM

## 2025-08-01 RX ORDER — EPHEDRINE SULFATE 50 MG/ML
INJECTION INTRAVENOUS AS NEEDED
Status: DISCONTINUED | OUTPATIENT
Start: 2025-08-01 | End: 2025-08-01 | Stop reason: SURG

## 2025-08-01 RX ORDER — LABETALOL HYDROCHLORIDE 5 MG/ML
5 INJECTION, SOLUTION INTRAVENOUS
Status: DISCONTINUED | OUTPATIENT
Start: 2025-08-01 | End: 2025-08-01 | Stop reason: HOSPADM

## 2025-08-01 RX ORDER — FAMOTIDINE 10 MG/ML
20 INJECTION, SOLUTION INTRAVENOUS ONCE
Status: COMPLETED | OUTPATIENT
Start: 2025-08-01 | End: 2025-08-01

## 2025-08-01 RX ORDER — ONDANSETRON 2 MG/ML
4 INJECTION INTRAMUSCULAR; INTRAVENOUS ONCE AS NEEDED
Status: DISCONTINUED | OUTPATIENT
Start: 2025-08-01 | End: 2025-08-01 | Stop reason: HOSPADM

## 2025-08-01 RX ORDER — LIDOCAINE HYDROCHLORIDE 10 MG/ML
0.5 INJECTION, SOLUTION INFILTRATION; PERINEURAL ONCE AS NEEDED
Status: DISCONTINUED | OUTPATIENT
Start: 2025-08-01 | End: 2025-08-01 | Stop reason: HOSPADM

## 2025-08-01 RX ORDER — PROMETHAZINE HYDROCHLORIDE 25 MG/1
25 SUPPOSITORY RECTAL ONCE AS NEEDED
Status: DISCONTINUED | OUTPATIENT
Start: 2025-08-01 | End: 2025-08-01 | Stop reason: HOSPADM

## 2025-08-01 RX ORDER — ATROPINE SULFATE 0.4 MG/ML
0.4 INJECTION, SOLUTION INTRAMUSCULAR; INTRAVENOUS; SUBCUTANEOUS ONCE AS NEEDED
Status: DISCONTINUED | OUTPATIENT
Start: 2025-08-01 | End: 2025-08-01 | Stop reason: HOSPADM

## 2025-08-01 RX ORDER — HYDROCODONE BITARTRATE AND ACETAMINOPHEN 5; 325 MG/1; MG/1
1 TABLET ORAL ONCE AS NEEDED
Status: COMPLETED | OUTPATIENT
Start: 2025-08-01 | End: 2025-08-01

## 2025-08-01 RX ORDER — SODIUM CHLORIDE 0.9 % (FLUSH) 0.9 %
3 SYRINGE (ML) INJECTION EVERY 12 HOURS SCHEDULED
Status: DISCONTINUED | OUTPATIENT
Start: 2025-08-01 | End: 2025-08-01 | Stop reason: HOSPADM

## 2025-08-01 RX ORDER — SODIUM CHLORIDE 0.9 % (FLUSH) 0.9 %
3-10 SYRINGE (ML) INJECTION AS NEEDED
Status: DISCONTINUED | OUTPATIENT
Start: 2025-08-01 | End: 2025-08-01 | Stop reason: HOSPADM

## 2025-08-01 RX ADMIN — SODIUM CHLORIDE, POTASSIUM CHLORIDE, SODIUM LACTATE AND CALCIUM CHLORIDE 9 ML/HR: 600; 310; 30; 20 INJECTION, SOLUTION INTRAVENOUS at 08:01

## 2025-08-01 RX ADMIN — FENTANYL CITRATE 50 MCG: 50 INJECTION, SOLUTION INTRAMUSCULAR; INTRAVENOUS at 09:47

## 2025-08-01 RX ADMIN — EPHEDRINE SULFATE 5 MG: 50 INJECTION INTRAVENOUS at 10:02

## 2025-08-01 RX ADMIN — EPHEDRINE SULFATE 5 MG: 50 INJECTION INTRAVENOUS at 10:10

## 2025-08-01 RX ADMIN — PROPOFOL 100 MCG/KG/MIN: 10 INJECTION, EMULSION INTRAVENOUS at 09:47

## 2025-08-01 RX ADMIN — SODIUM CHLORIDE, SODIUM LACTATE, POTASSIUM CHLORIDE, AND CALCIUM CHLORIDE: 600; 310; 30; 20 INJECTION, SOLUTION INTRAVENOUS at 09:40

## 2025-08-01 RX ADMIN — PHENYLEPHRINE HYDROCHLORIDE 100 MCG: 10 INJECTION INTRAVENOUS at 10:37

## 2025-08-01 RX ADMIN — MIDAZOLAM 0.5 MG: 1 INJECTION INTRAMUSCULAR; INTRAVENOUS at 08:34

## 2025-08-01 RX ADMIN — HYDROCODONE BITARTRATE AND ACETAMINOPHEN 1 TABLET: 5; 325 TABLET ORAL at 11:31

## 2025-08-01 RX ADMIN — FENTANYL CITRATE 25 MCG: 50 INJECTION, SOLUTION INTRAMUSCULAR; INTRAVENOUS at 11:16

## 2025-08-01 RX ADMIN — SODIUM CHLORIDE 2000 MG: 900 INJECTION INTRAVENOUS at 09:35

## 2025-08-01 RX ADMIN — FAMOTIDINE 20 MG: 10 INJECTION INTRAVENOUS at 08:34

## 2025-08-01 NOTE — ANESTHESIA POSTPROCEDURE EVALUATION
Patient: Yuli Huang    Procedure Summary       Date: 08/01/25 Room / Location: [facility] OR  / [facility] MAIN OR    Anesthesia Start: 0940 Anesthesia Stop: 1100    Procedure: RIGHT BREAST PARTIAL MASTECTOMY WITH MARIA GUADALUPE LOCALIZATION (Right: Breast) Diagnosis:       Malignant neoplasm of upper-inner quadrant of right breast in female, estrogen receptor positive      (Malignant neoplasm of upper-inner quadrant of right breast in female, estrogen receptor positive [C50.211, Z17.0])    Surgeons: Janet Mcclure MD Provider: Niecy Jin MD    Anesthesia Type: MAC ASA Status: 2            Anesthesia Type: MAC    Vitals  Vitals Value Taken Time   /70 08/01/25 11:45   Temp 36.4 °C (97.6 °F) 08/01/25 11:01   Pulse 65 08/01/25 11:49   Resp 18 08/01/25 11:45   SpO2 99 % 08/01/25 11:49   Vitals shown include unfiled device data.        Post Anesthesia Care and Evaluation    Patient location during evaluation: bedside  Patient participation: complete - patient participated  Level of consciousness: awake  Pain management: adequate    Airway patency: patent  Anesthetic complications: No anesthetic complications  PONV Status: controlled  Cardiovascular status: acceptable  Respiratory status: acceptable  Hydration status: acceptable    Comments: /70   Pulse 62   Temp 36.4 °C (97.6 °F) (Oral)   Resp 18   LMP  (LMP Unknown) Comment: NO HRT  SpO2 100%

## 2025-08-01 NOTE — OP NOTE
BREAST LUMPECTOMY WITH LAUREL LOCALIZATION  Procedure Report    Patient Name:  Yuli Huang  YOB: 1950    Date of Surgery:  8/1/2025     Pre-op Diagnosis:   Malignant neoplasm of upper-inner quadrant of right breast in female, estrogen receptor positive [C50.211, Z17.0]    Post-Op Diagnosis Codes:     * Malignant neoplasm of upper-inner quadrant of right breast in female, estrogen receptor positive [C50.211, Z17.0]     Procedure(s):  RIGHT BREAST PARTIAL MASTECTOMY WITH LAUREL LOCALIZATION       Staff:  Surgeon(s):  Janet Mcclure MD         Anesthesia: Monitored Anesthesia Care    Estimated Blood Loss: minimal    Implants:    Implant Name Type Inv. Item Serial No.  Lot No. LRB No. Used Action   CLIPAPPLR M/ ENDO [id number] 3/8IN MD - [id number] Implant CLIPAPPLR M/ ENDO [id number] 3/8IN MD  [employer]  [employer] J [id number] Right 1 Implanted       Specimen:          Specimens       ID Source Type Tests Collected By Collected At Frozen?    A Breast, Right Tissue TISSUE PATHOLOGY EXAM   Janet Mcclure MD 8/1/25 1020 No    Description: RIGHT BREAST PARTIAL MASTECTOMY FRESH FOR PERMANENT; INK READ MARGINS    B Breast, Right Tissue TISSUE PATHOLOGY EXAM   Jante Mcclure MD 8/1/25 1022 No    Description: RIGHT BREAST SUPERIOR MARGIN; INK AT TRUE MARGIN    C Breast, Right Tissue TISSUE PATHOLOGY EXAM   Janet Mcclure MD 8/1/25 1022 No    Description: RIGHT BREAST MEDIAL MARGIN; INK AT TRUE MARGIN    D Breast, Right Tissue TISSUE PATHOLOGY EXAM   Janet Mcclure MD 8/1/25 1022 No    Description: RIGHT BREAST LATERAL MARGIN; INK AT TRUE MARGIN    E Breast, Right Tissue TISSUE PATHOLOGY EXAM   Janet Mcclure MD 8/1/25 1022 No    Description: RIGHT BREAST INFERIOR MARGIN; INK AT TRUE MARGIN    F Breast, Right Tissue TISSUE PATHOLOGY EXAM   Janet Mcclure MD 8/1/25 1022 No    Description: RIGHT BREAST ANTERIOR MARGIN; INK AT TRUE MARGIN            Findings: clip and laurel within partial mastectomy  specimen    Complications: none    Description of Procedure:   The risks and benefits of the procedure were explained in detail to the patient.  Informed consent was obtained.  Prior to arrival, the patient had a radiographically localizing MARIA GUADALUPE placed for proper identification of the breast lesion in question.     The patient was then taken to the operating room and placed supine on the operating room table.  Sequential compression devices were applied to the lower extremities and preoperative antibiotics administered prior to the start of the case. After the administration of adequate anesthesia, the right breast was prepped and draped in the standard surgical fashion. A time out was then performed to identify the proper patient, procedure, and location.      Local anesthesia comprised of lidocaine and Marcaine with epinephrine was injected in the planned area of surgical intervention.   A curvilinear incision was then made on the superior medial aspect of the right breast based on the mammographic images and the positioning of the localizing MARIA GUADALUPE.  Dissection was carried through the skin to the underlying subcutaneous tissues immediately adjacent to her previous partial mastectomy incision.  Sharp dissection with the use of electrocautery was utilized to dissect down to the level of the clip and mass.  The region was dissected free from the rest of the breast and removed, care taken to ensure that the MARIA GUADALUPE was intact and the lesion in question was positioned near the center of the resected specimen. The specimen was then marked by ink: Green - Anterior, Blue - Inferior, Orange - lateral, Yellow - medial, Black - posterior, Red - Superior.  Intraoperative specimen radiograph confirmed the lesion and the previously placed biopsy clip were within the specimen.  Additional margins were then obtained and clips placed to taylor the resection cavity. Ink marks true margin for pathology evaluation.  The posterior margin  was taken down to pectoral fascia. Hemostasis was ensured. The underlying breast tissue was reapproximated with a 2-0 Vicryl suture.  The wound was then closed in two layers with absorbable suture (3-0 Monocryl deep dermal and 4-0 Monocryl subcuticular) and Dermabond applied. A compression dressing/bra was applied. At the completion of the operation all sponge, instrument and needle counts were correct. The patient tolerated the procedure well and was transported to the postanesthesia care unit in stable condition.    This procedure was not performed to treat breast cancer through sentinel node biopsy     Janet Mcclure MD     Date: 8/1/2025  Time: 12:56 EDT

## 2025-08-01 NOTE — DISCHARGE INSTRUCTIONS
Partial Mastectomy    Preparing for your Surgery:  You will be scheduled for an appointment in the days before surgery to have blood work and other possible tests to make sure it's safe to have surgery.  You may be scheduled for other images OR procedures to help plan and guide the surgery.  You will go home the same day as your procedure. Please be sure a  is available to take you home, and someone can stay the rest of the day with you.   Remember - nothing to eat or drink after midnight the night before your surgery. It's OK to take your required medicine the morning of with a small sip of water.   Your Surgery:  Depending on the location and size of your abnormal tissue, you will wither have an incision near the mass or around part of your nipple.   The surgery removes the abnormal breast tissues and some of the normal surrounding tissue. The pathologists will examine the tissue to make a final diagnosis and evaluate the edges.  Your incision will be closed with stitches that will dissolve on their own. The incision will be closed with surgical glue. This glue will fall off in several days.  After Your Surgery  Do not drive or make important decisions the day of surgery.  Someone should stay with you the rest of the day and overnight.  Wear your surgical bra, compression bra or ace bandage wrap until your follow up appointment. OK to remove it to shower.  You may shower 24 hours after surgery. Do not scrub the incision, let soapy water run down over the incision, then pat dry. Keep the incision clean and dry. No baths, pools, or spas until your follow up appointment.   OK to use ice packs for pain control. Please do not use heating pads.    Diet/Activity  OK to eat a regular diet. Do not drink alcohol while taking pain medication.  No lifting, pushing, pulling anything over 5 lbs - avoid sports, heavy work, stair climbers, or elliptical machines.   To reduce the risk of blood clots, get up and move every 90  minutes during the day. Do not lay or sit in one position for more than 90 minutes.     Medications:  Please take Ibuprofen and Acetaminophen for pain control. You can alternate with 600 mg of ibuprofen every 6 hours then, 650 mg of acetaminophen every 6 hours.  For Example:  9 am: Acetaminophen  12 pm (noon): Ibuprofen  3pm: Acetaminophen  6 pm: Ibuprofen  9pm: Acetaminophen  Midnight: Ibuprofen  3am: Acetaminophen  IF you are taking prescription pain medication, take stool softners to prevent constipation.   Reasons to call the office or your Surgeon:  If you notice: redness, swelling, odor or drainage from the incision  If you have a fever greater than 101F (38C)  A small amount of bloody/blood-tinged fluid from your incision is normal. If there is excessive bleeding, please call the office.     Janet Mcclure MD  [facility]  [place] [address] [phone]

## 2025-08-01 NOTE — H&P
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 multidisciplinary plan has been formulated for the patient:      # Breast Surgical Oncology:  - she is adamently against another mastectomy  -Consents completed and signed. Discussed the risks and benefits of right partial mastectomy laurel guided at length including estimated time for procedure, postoperative recovery, and postoperative instructions. Discussed the risks to include pain, bleeding, infection, nerve injury, numbness, seroma, skin complications including necrosis, breast asymmetry, need for re-excision, lymphedema, inability to breast feed in the future, possible need for axillary dissection at time of surgery or at a later date, lymphocele, and scar.  Patient appears to understand and wishes to proceed.  All questions were answered.  Discussed due to her previous history of radiation she has increased risk of wound healing, should there be a close margin would need to rediscuss role of mastectomy.  -Will discuss at tumor board- while not strong support for proceeding with breast conservation as this was the patients preferred first treatment option with strong opposition for  mastectomy on the right side unless unable to obtain negative margins with partial mastectomy - will proceed as planned above.   - OR today      # Medical Oncology:  -Will refer postoperatively.  - Is an Oncotype candidate    #  Radiation Oncology:  -Will refer postoperatively indicated    # Nurse Navigation  - Referral made today    # Lymphedema clinic  - Referral will be placed postop pending surgical plans, no axillary intervention planned at this time if she has symptoms or flare due to surgical intervention in her breast will refer at that time    # Genetic Testing   - 84 multicancer gene panel negative - sent in 2020    # Breast Imaging  - Next Screening mammogram due: 6/4/2026 at [facility]      Janet Mcclure MD  Breast Surgical Oncology

## 2025-08-01 NOTE — ANESTHESIA PREPROCEDURE EVALUATION
Anesthesia Evaluation     Patient summary reviewed and Nursing notes reviewed   history of anesthetic complications:  PONV  NPO Solid Status: > 8 hours  NPO Liquid Status: > 2 hours           Airway   Comment: Grade I view with [place] 2  Dental      Pulmonary    (+) a smoker Former,  Cardiovascular     ECG reviewed    (+) dysrhythmias Tachycardia, PVC, hyperlipidemia  (-) CAD    ROS comment: Hx NSVT/no hx CAD per pt/EF 65% by ECHO 6/25    Neuro/Psych  (+) dizziness/light headedness, numbness, psychiatric history Depression    ROS Comment: Migraines/vertigo/chemotherapy-induced peripheral neuropathy  GI/Hepatic/Renal/Endo    (+) diabetes mellitus gestational    Musculoskeletal         ROS comment: Hx ACDF  Abdominal    Substance History      OB/GYN          Other   arthritis,   history of cancer active      Other Comment: Bilateral breast CA, s/p left mastectomy in 11/16              Anesthesia Plan    ASA 2     MAC     intravenous induction     Anesthetic plan, risks, benefits, and alternatives have been provided, discussed and informed consent has been obtained with: patient.    CODE STATUS:

## 2025-08-02 LAB
CYTO UR: NORMAL
LAB AP CASE REPORT: NORMAL
LAB AP DIAGNOSIS COMMENT: NORMAL
LAB AP SYNOPTIC CHECKLIST: NORMAL
PATH REPORT.FINAL DX SPEC: NORMAL
PATH REPORT.GROSS SPEC: NORMAL

## 2025-08-04 ENCOUNTER — RESULTS FOLLOW-UP (OUTPATIENT)
Dept: PERIOP | Facility: HOSPITAL | Age: 75
End: 2025-08-04
Payer: MEDICARE

## 2025-08-04 DIAGNOSIS — Z17.0 MALIGNANT NEOPLASM OF UPPER-INNER QUADRANT OF RIGHT BREAST IN FEMALE, ESTROGEN RECEPTOR POSITIVE: Primary | ICD-10-CM

## 2025-08-04 DIAGNOSIS — C50.211 MALIGNANT NEOPLASM OF UPPER-INNER QUADRANT OF RIGHT BREAST IN FEMALE, ESTROGEN RECEPTOR POSITIVE: Primary | ICD-10-CM

## 2025-08-14 ENCOUNTER — OFFICE VISIT (OUTPATIENT)
Dept: SURGERY | Facility: CLINIC | Age: 75
End: 2025-08-14
Payer: MEDICARE

## 2025-08-14 VITALS
HEART RATE: 86 BPM | DIASTOLIC BLOOD PRESSURE: 74 MMHG | BODY MASS INDEX: 25.19 KG/M2 | WEIGHT: 151.2 LBS | SYSTOLIC BLOOD PRESSURE: 136 MMHG | OXYGEN SATURATION: 95 % | HEIGHT: 65 IN

## 2025-08-14 DIAGNOSIS — Z17.0 MALIGNANT NEOPLASM OF UPPER-INNER QUADRANT OF RIGHT BREAST IN FEMALE, ESTROGEN RECEPTOR POSITIVE: Primary | ICD-10-CM

## 2025-08-14 DIAGNOSIS — C50.211 MALIGNANT NEOPLASM OF UPPER-INNER QUADRANT OF RIGHT BREAST IN FEMALE, ESTROGEN RECEPTOR POSITIVE: Primary | ICD-10-CM

## 2025-08-14 PROCEDURE — 1159F MED LIST DOCD IN RCRD: CPT | Performed by: STUDENT IN AN ORGANIZED HEALTH CARE EDUCATION/TRAINING PROGRAM

## 2025-08-14 PROCEDURE — 1160F RVW MEDS BY RX/DR IN RCRD: CPT | Performed by: STUDENT IN AN ORGANIZED HEALTH CARE EDUCATION/TRAINING PROGRAM

## 2025-08-14 PROCEDURE — 99024 POSTOP FOLLOW-UP VISIT: CPT | Performed by: STUDENT IN AN ORGANIZED HEALTH CARE EDUCATION/TRAINING PROGRAM

## 2025-08-14 RX ORDER — CONJUGATED ESTROGENS 0.62 MG/G
CREAM VAGINAL
COMMUNITY

## 2025-08-14 RX ORDER — TERBINAFINE HYDROCHLORIDE 250 MG/1
TABLET ORAL
COMMUNITY

## 2025-08-21 ENCOUNTER — TELEPHONE (OUTPATIENT)
Dept: SURGERY | Facility: CLINIC | Age: 75
End: 2025-08-21
Payer: MEDICARE

## 2025-08-26 ENCOUNTER — HOSPITAL ENCOUNTER (OUTPATIENT)
Dept: SURGERY | Facility: HOSPITAL | Age: 75
Discharge: HOME | End: 2025-08-26
Payer: MEDICARE

## (undated) DEVICE — SUT ETHLN 3/0 PS1 18IN 1663H

## (undated) DEVICE — GLV SURG BIOGEL LTX PF 8

## (undated) DEVICE — PK ORTHO MINOR TOWER 40

## (undated) DEVICE — CONTAINER,SPECIMEN,OR STERILE,4OZ: Brand: MEDLINE

## (undated) DEVICE — TRAP FLD MINIVAC MEGADYNE 100ML

## (undated) DEVICE — 450 ML BOTTLE OF 0.05% CHLORHEXIDINE GLUCONATE IN 99.95% STERILE WATER FOR IRRIGATION, USP AND APPLICATOR.: Brand: IRRISEPT ANTIMICROBIAL WOUND LAVAGE

## (undated) DEVICE — PATIENT RETURN ELECTRODE, SINGLE-USE, CONTACT QUALITY MONITORING, ADULT, WITH 9FT CORD, FOR PATIENTS WEIGING OVER 33LBS. (15KG): Brand: MEGADYNE

## (undated) DEVICE — DRP C/ARMOR

## (undated) DEVICE — SMOKE EVACUATION TUBING WITH 7/8 IN TO 1/4 IN REDUCER: Brand: BUFFALO FILTER

## (undated) DEVICE — EXOFIN PRECISION PEN HIGH VISCOSITY TOPICAL SKIN ADHESIVE: Brand: EXOFIN PRECISION PEN, 1G

## (undated) DEVICE — DRAPE,U/ SHT,SPLIT,PLAS,STERIL: Brand: MEDLINE

## (undated) DEVICE — LEGGINGS, PAIR, 31X48, STERILE: Brand: MEDLINE

## (undated) DEVICE — SUT VIC 3/0 CT2 27IN J232H

## (undated) DEVICE — ANTIBACTERIAL UNDYED BRAIDED (POLYGLACTIN 910), SYNTHETIC ABSORBABLE SUTURE: Brand: COATED VICRYL

## (undated) DEVICE — STCKNT IMPERV 12IN STRL

## (undated) DEVICE — GLV SURG BIOGEL LTX PF 7

## (undated) DEVICE — Device

## (undated) DEVICE — PAD,ABDOMINAL,8"X10",ST,LF: Brand: MEDLINE

## (undated) DEVICE — ELECTRD BLD EZ CLN MOD XLNG 2.75IN

## (undated) DEVICE — STPLR SKIN VISISTAT WD 35CT

## (undated) DEVICE — GLV SURG SENSICARE PI LF PF 7.5 GRN STRL

## (undated) DEVICE — PK UNIV COMPL 40

## (undated) DEVICE — PROXIMATE RH ROTATING HEAD SKIN STAPLERS (35 WIDE) CONTAINS 35 STAINLESS STEEL STAPLES: Brand: PROXIMATE

## (undated) DEVICE — DISPOSABLE TOURNIQUET CUFF SINGLE BLADDER, SINGLE PORT AND QUICK CONNECT CONNECTOR: Brand: COLOR CUFF

## (undated) DEVICE — INTENDED FOR TISSUE SEPARATION, AND OTHER PROCEDURES THAT REQUIRE A SHARP SURGICAL BLADE TO PUNCTURE OR CUT.: Brand: BARD-PARKER ® CARBON RIB-BACK BLADES

## (undated) DEVICE — GLV SURG PREMIERPRO ORTHO LTX PF SZ8 BRN

## (undated) DEVICE — UNDERCAST PADDING: Brand: DEROYAL

## (undated) DEVICE — SPLNT PLSTR ORTHO 5IN

## (undated) DEVICE — APPL CHLORAPREP HI/LITE 26ML ORNG

## (undated) DEVICE — DRSNG GZ CURAD XEROFORM NONADHS 5X9IN STRL

## (undated) DEVICE — PREP SOL POVIDONE/IODINE BT 4OZ

## (undated) DEVICE — SHEATH GUIDE SCOUT SURG TPR 8.3TO3.2CM 264CM STRL

## (undated) DEVICE — GLV SURG SIGNATURE ESSENTIAL PF LTX SZ8

## (undated) DEVICE — SYR LL TP 10ML STRL

## (undated) DEVICE — DRP SLUSH WARMR MACH CIR 44X44IN

## (undated) DEVICE — BNDG ELAS ELITE V/CLOSE 6IN 5YD LF STRL

## (undated) DEVICE — SUT VIC 2/0 CT2 27IN J269H

## (undated) DEVICE — COVER,TABLE,44X90,STERILE: Brand: MEDLINE

## (undated) DEVICE — DRP WARMR SCOPE PILLOW SCPE 44X66IN STRL

## (undated) DEVICE — ZIPPERED TOGA, 2X LARGE: Brand: FLYTE, SURGICOOL

## (undated) DEVICE — DRP C/ARM 41X74IN

## (undated) DEVICE — SPNG LAP 18X18IN LF STRL PK/5

## (undated) DEVICE — SUT MNCRYL PLS ANTIB UD 4/0 PS2 18IN

## (undated) DEVICE — POOLE SUCTION HANDLE: Brand: CARDINAL HEALTH

## (undated) DEVICE — SOL ISO/ALC RUB 70PCT 4OZ

## (undated) DEVICE — TBG PENCL TELESCP MEGADYNE SMOKE EVAC 10FT

## (undated) DEVICE — PRECISION THIN (27.0 X 0.38MM)

## (undated) DEVICE — GUIDEWIRE, NON-THREADED, 1.4 X 150MM: Brand: PENDING

## (undated) DEVICE — SUT SILK 2/0 SH 30IN K833H

## (undated) DEVICE — COVER,MAYO STAND,STERILE: Brand: MEDLINE

## (undated) DEVICE — SPNG GZ WOVN 4X4IN 12PLY 10/BX STRL

## (undated) DEVICE — CONN TBG Y 5 IN 1 LF STRL

## (undated) DEVICE — NDL HYPO PRECISIONGLIDE REG 25G 1 1/2